# Patient Record
Sex: MALE | Race: WHITE | NOT HISPANIC OR LATINO | URBAN - METROPOLITAN AREA
[De-identification: names, ages, dates, MRNs, and addresses within clinical notes are randomized per-mention and may not be internally consistent; named-entity substitution may affect disease eponyms.]

---

## 2020-01-15 ENCOUNTER — EMERGENCY (EMERGENCY)
Facility: HOSPITAL | Age: 55
LOS: 1 days | Discharge: HOME | End: 2020-01-15
Attending: EMERGENCY MEDICINE
Payer: COMMERCIAL

## 2020-01-15 VITALS
HEIGHT: 73 IN | HEART RATE: 91 BPM | RESPIRATION RATE: 18 BRPM | OXYGEN SATURATION: 99 % | WEIGHT: 190.04 LBS | TEMPERATURE: 98 F | DIASTOLIC BLOOD PRESSURE: 110 MMHG | SYSTOLIC BLOOD PRESSURE: 183 MMHG

## 2020-01-15 LAB
ANION GAP SERPL CALC-SCNC: 14 MMOL/L — SIGNIFICANT CHANGE UP (ref 5–17)
BUN SERPL-MCNC: 14 MG/DL — SIGNIFICANT CHANGE UP (ref 8–20)
CALCIUM SERPL-MCNC: 9.6 MG/DL — SIGNIFICANT CHANGE UP (ref 8.6–10.2)
CHLORIDE SERPL-SCNC: 103 MMOL/L — SIGNIFICANT CHANGE UP (ref 98–107)
CO2 SERPL-SCNC: 22 MMOL/L — SIGNIFICANT CHANGE UP (ref 22–29)
CREAT SERPL-MCNC: 0.83 MG/DL — SIGNIFICANT CHANGE UP (ref 0.5–1.3)
GLUCOSE SERPL-MCNC: 102 MG/DL — SIGNIFICANT CHANGE UP (ref 70–115)
HCT VFR BLD CALC: 45.6 % — SIGNIFICANT CHANGE UP (ref 39–50)
HGB BLD-MCNC: 15.6 G/DL — SIGNIFICANT CHANGE UP (ref 13–17)
MCHC RBC-ENTMCNC: 30.8 PG — SIGNIFICANT CHANGE UP (ref 27–34)
MCHC RBC-ENTMCNC: 34.2 GM/DL — SIGNIFICANT CHANGE UP (ref 32–36)
MCV RBC AUTO: 89.9 FL — SIGNIFICANT CHANGE UP (ref 80–100)
PLATELET # BLD AUTO: 258 K/UL — SIGNIFICANT CHANGE UP (ref 150–400)
POTASSIUM SERPL-MCNC: 3.7 MMOL/L — SIGNIFICANT CHANGE UP (ref 3.5–5.3)
POTASSIUM SERPL-SCNC: 3.7 MMOL/L — SIGNIFICANT CHANGE UP (ref 3.5–5.3)
RBC # BLD: 5.07 M/UL — SIGNIFICANT CHANGE UP (ref 4.2–5.8)
RBC # FLD: 12.5 % — SIGNIFICANT CHANGE UP (ref 10.3–14.5)
SODIUM SERPL-SCNC: 139 MMOL/L — SIGNIFICANT CHANGE UP (ref 135–145)
TROPONIN T SERPL-MCNC: <0.01 NG/ML — SIGNIFICANT CHANGE UP (ref 0–0.06)
TROPONIN T SERPL-MCNC: <0.01 NG/ML — SIGNIFICANT CHANGE UP (ref 0–0.06)
WBC # BLD: 9.18 K/UL — SIGNIFICANT CHANGE UP (ref 3.8–10.5)
WBC # FLD AUTO: 9.18 K/UL — SIGNIFICANT CHANGE UP (ref 3.8–10.5)

## 2020-01-15 PROCEDURE — 71046 X-RAY EXAM CHEST 2 VIEWS: CPT

## 2020-01-15 PROCEDURE — 85027 COMPLETE CBC AUTOMATED: CPT

## 2020-01-15 PROCEDURE — 80048 BASIC METABOLIC PNL TOTAL CA: CPT

## 2020-01-15 PROCEDURE — 93010 ELECTROCARDIOGRAM REPORT: CPT | Mod: 76

## 2020-01-15 PROCEDURE — 93005 ELECTROCARDIOGRAM TRACING: CPT

## 2020-01-15 PROCEDURE — 84484 ASSAY OF TROPONIN QUANT: CPT

## 2020-01-15 PROCEDURE — 71046 X-RAY EXAM CHEST 2 VIEWS: CPT | Mod: 26

## 2020-01-15 PROCEDURE — 99285 EMERGENCY DEPT VISIT HI MDM: CPT

## 2020-01-15 PROCEDURE — 36415 COLL VENOUS BLD VENIPUNCTURE: CPT

## 2020-01-15 NOTE — ED PROVIDER NOTE - OBJECTIVE STATEMENT
54M h/o HTN not compliant with meds p/w mid sternal non radiating CP x 2 hours. Pt arrested today, was sitting in half-way and felt stressed and began having pain. Denies SOB, fever, nausea, vomiting, diaphoresis. +family history of cardiac disease.

## 2020-01-15 NOTE — ED ADULT TRIAGE NOTE - CHIEF COMPLAINT QUOTE
"I had a fight with my girlfriend and now I'm arrested" "I'm stressing out and my chest is killing me" c/o chest pressure starting 1.5 hours ago, pt verbalizing stress and anxiety. Hx HTN is noncompliant with medications. Mac wrist handcuffs in place, +PMS to mac hands. denies sob denies n/v/d denies pain radiation

## 2020-01-15 NOTE — ED PROVIDER NOTE - CARE PROVIDER_API CALL
Ryan Humphries)  Cardiology; Cardiovascular Disease; Internal Medicine  [address] [place] [address]  Phone: [phone]  Fax: [phone]  Follow Up Time:

## 2020-01-15 NOTE — ED PROVIDER NOTE - PROGRESS NOTE DETAILS
Received patient signout from Dr. Aleman.  Patient hx of HTN sitting in [facility] and started having chest pain.  1st set negative.  Patient pending 2nd set.

## 2020-01-15 NOTE — ED PROVIDER NOTE - CLINICAL SUMMARY MEDICAL DECISION MAKING FREE TEXT BOX
Pt with mid sternal CP x 2 hours.   - labs, ecg, trop x 2, cxr, reass Pt with mid sternal CP x 2 hours. HEART score 2  - labs, ecg, trop x 2, cxr, reass

## 2020-01-15 NOTE — ED PROVIDER NOTE - PHYSICAL EXAMINATION
Gen: Well appearing in mild distress  Head: NC/AT  Neck: trachea midline  Resp:  No distress, CTAB  CV: RRR  GI: abd, soft NTND  Ext: no deformities  Neuro:  A&O appears non focal  Skin:  Warm and dry as visualized  Psych:  Normal affect and mood

## 2020-01-15 NOTE — ED PROVIDER NOTE - PATIENT PORTAL LINK FT
You can access the FollowMyHealth Patient Portal offered by [facility] by registering at the following website: [email or web address]. By joining [employer]’s FollowMyHealth portal, you will also be able to view your health information using other applications (apps) compatible with our system.

## 2020-02-01 ENCOUNTER — OUTPATIENT (OUTPATIENT)
Dept: OUTPATIENT SERVICES | Facility: HOSPITAL | Age: 55
LOS: 1 days | End: 2020-02-01
Payer: MEDICAID

## 2020-02-01 PROCEDURE — G9001: CPT

## 2020-02-19 DIAGNOSIS — Z71.89 OTHER SPECIFIED COUNSELING: ICD-10-CM

## 2020-02-19 PROBLEM — I10 ESSENTIAL (PRIMARY) HYPERTENSION: Chronic | Status: ACTIVE | Noted: 2020-01-15

## 2020-12-07 ENCOUNTER — TRANSCRIPTION ENCOUNTER (OUTPATIENT)
Age: 55
End: 2020-12-07

## 2021-05-11 ENCOUNTER — APPOINTMENT (OUTPATIENT)
Dept: INTERNAL MEDICINE | Facility: CLINIC | Age: 56
End: 2021-05-11
Payer: MEDICAID

## 2021-05-11 ENCOUNTER — NON-APPOINTMENT (OUTPATIENT)
Age: 56
End: 2021-05-11

## 2021-05-11 VITALS
WEIGHT: 276 LBS | HEIGHT: 72 IN | BODY MASS INDEX: 37.38 KG/M2 | OXYGEN SATURATION: 97 % | RESPIRATION RATE: 14 BRPM | HEART RATE: 76 BPM | TEMPERATURE: 97.4 F | DIASTOLIC BLOOD PRESSURE: 88 MMHG | SYSTOLIC BLOOD PRESSURE: 122 MMHG

## 2021-05-11 DIAGNOSIS — Z82.3 FAMILY HISTORY OF STROKE: ICD-10-CM

## 2021-05-11 DIAGNOSIS — M25.551 PAIN IN RIGHT HIP: ICD-10-CM

## 2021-05-11 DIAGNOSIS — Z80.0 FAMILY HISTORY OF MALIGNANT NEOPLASM OF DIGESTIVE ORGANS: ICD-10-CM

## 2021-05-11 DIAGNOSIS — N40.1 BENIGN PROSTATIC HYPERPLASIA WITH LOWER URINARY TRACT SYMPMS: ICD-10-CM

## 2021-05-11 DIAGNOSIS — R35.0 BENIGN PROSTATIC HYPERPLASIA WITH LOWER URINARY TRACT SYMPMS: ICD-10-CM

## 2021-05-11 DIAGNOSIS — Z80.42 FAMILY HISTORY OF MALIGNANT NEOPLASM OF PROSTATE: ICD-10-CM

## 2021-05-11 DIAGNOSIS — Z00.00 ENCOUNTER FOR GENERAL ADULT MEDICAL EXAMINATION W/OUT ABNORMAL FINDINGS: ICD-10-CM

## 2021-05-11 PROCEDURE — 99072 ADDL SUPL MATRL&STAF TM PHE: CPT

## 2021-05-11 PROCEDURE — G0444 DEPRESSION SCREEN ANNUAL: CPT

## 2021-05-11 PROCEDURE — G0442 ANNUAL ALCOHOL SCREEN 15 MIN: CPT

## 2021-05-11 PROCEDURE — 99386 PREV VISIT NEW AGE 40-64: CPT | Mod: 25

## 2021-05-11 PROCEDURE — 93000 ELECTROCARDIOGRAM COMPLETE: CPT | Mod: 59

## 2021-05-11 NOTE — HEALTH RISK ASSESSMENT
[Very Good] : ~his/her~  mood as very good [2 - 4 times a month (2 pts)] : 2-4 times a month (2 points) [3 or 4 (1 pt)] : 3 or 4  (1 point) [Never (0 pts)] : Never (0 points) [Yes] : In the past 12 months have you used drugs other than those required for medical reasons? Yes [0] : 2) Feeling down, depressed, or hopeless: Not at all (0) [Patient declined colonoscopy] : Patient declined colonoscopy [HIV test declined] : HIV test declined [Hepatitis C test declined] : Hepatitis C test declined [Alone] : lives alone [Employed] : employed [Single] : single [Significant Other] : lives with significant other [# Of Children ___] : has [unfilled] children [Sexually Active] : sexually active [Reviewed no changes] : Reviewed no changes [] : No [Audit-CScore] : 3 [de-identified] : daily marijuana  [[id number]] : 0 [Change in mental status noted] : No change in mental status noted [None] : None [High Risk Behavior] : no high risk behavior [Reports changes in hearing] : Reports no changes in hearing [Reports changes in vision] : Reports no changes in vision [ColonoscopyDate] : 05/21 [AdvancecareDate] : 05/21

## 2021-05-11 NOTE — ASSESSMENT
[FreeTextEntry1] : -PMH: Obesity, H/o Right femur, BPH\par -SH: Single but marcelina  relationship. 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 Occasional EtOH use. \par \par ARSLAN is a 55 year M whom is here today for an annual well check and to establish care w/ a new PMD\par \par Specialists Involved:\par -GI: Unaware of he saw\par \par EKG obtained in office today demonstrates NSR. normal axis/Intervals. Good-R-wave progression. Normal EKG. \par \par -F/u labs drawn in office today\par -Further recs pending lab results\par -Recommend Ortho consult (Right hip Pain)\par -RTO yearly for CPE or sooner if needed\par

## 2021-05-11 NOTE — HISTORY OF PRESENT ILLNESS
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 Occasional EtOH use. \par \par ARSLAN is a 55 year M whom is here today for an annual well check and to establish care w/ a new PMD\par Today, pt reports feeling well but mentions right hip pain that began a few months ago. Pain is now constant with some days better than others. Denies any recent falls. Pain worse with activity and better with rest. He has tried OTC NSAIDs with some relief. \par \par Specialists Involved:\par -GI: Unaware of he saw\par \par -Vaccines: Declines Flu, TDap & Shingles vaccine \par -Colonoscopy: Normal a few years ago per pt. He cant remember who he saw therefore unable to obtain records. \par -FH of Prostate, Colon, breast or ovarian CA: None known

## 2021-05-12 LAB
ALBUMIN SERPL ELPH-MCNC: 4.5 G/DL
ALP BLD-CCNC: 97 U/L
ALT SERPL-CCNC: 30 U/L
ANION GAP SERPL CALC-SCNC: 15 MMOL/L
AST SERPL-CCNC: 24 U/L
BILIRUB SERPL-MCNC: 0.4 MG/DL
BUN SERPL-MCNC: 15 MG/DL
CALCIUM SERPL-MCNC: 9.6 MG/DL
CHLORIDE SERPL-SCNC: 106 MMOL/L
CHOLEST SERPL-MCNC: 199 MG/DL
CO2 SERPL-SCNC: 19 MMOL/L
CREAT SERPL-MCNC: 0.96 MG/DL
GLUCOSE SERPL-MCNC: 95 MG/DL
HDLC SERPL-MCNC: 33 MG/DL
LDLC SERPL CALC-MCNC: 127 MG/DL
NONHDLC SERPL-MCNC: 166 MG/DL
POTASSIUM SERPL-SCNC: 4.7 MMOL/L
PROT SERPL-MCNC: 7.4 G/DL
PSA SERPL-MCNC: 0.85 NG/ML
SODIUM SERPL-SCNC: 141 MMOL/L
TRIGL SERPL-MCNC: 195 MG/DL

## 2021-05-17 ENCOUNTER — APPOINTMENT (OUTPATIENT)
Dept: ORTHOPEDIC SURGERY | Facility: CLINIC | Age: 56
End: 2021-05-17
Payer: MEDICAID

## 2021-05-17 ENCOUNTER — NON-APPOINTMENT (OUTPATIENT)
Age: 56
End: 2021-05-17

## 2021-05-17 VITALS
HEART RATE: 79 BPM | SYSTOLIC BLOOD PRESSURE: 141 MMHG | BODY MASS INDEX: 37.25 KG/M2 | DIASTOLIC BLOOD PRESSURE: 92 MMHG | HEIGHT: 72 IN | WEIGHT: 275 LBS

## 2021-05-17 DIAGNOSIS — Z78.9 OTHER SPECIFIED HEALTH STATUS: ICD-10-CM

## 2021-05-17 PROCEDURE — 99203 OFFICE O/P NEW LOW 30 MIN: CPT

## 2021-05-17 PROCEDURE — 73502 X-RAY EXAM HIP UNI 2-3 VIEWS: CPT | Mod: RT

## 2021-05-21 NOTE — HISTORY OF PRESENT ILLNESS
[de-identified] : The patient comes in today with complaints of pain to his right hip.  He states when he was in his teens he had a subtrochanteric femur fracture.  He had rodding and he had a nail removed.  Over the last month, he is having persistent complaints of pain to his right hip.  This injury is not work related or due to an automobile accident.  The patient states the pain is constant.  The patient describes the pain as dull, sharp, stabbing and achy.  The patient states walking, bending and lying down makes the pain worse.\par \par Pain level includes a current pain level of 6/10.

## 2021-05-21 NOTE — ADDENDUM
[FreeTextEntry1] : This note was written by Carlee Warren on 05/19/2021 acting as scribe for Km Hamilton III, MD

## 2021-05-21 NOTE — DISCUSSION/SUMMARY
[de-identified] : At this time, he will be started on a course of physical therapy and anti-inflammatories for the right hip osteoarthritis.  We had a long discussion concerning his elevated BMI.  He is at an increased risk for perioperative complications.  He is going to see his primary care and nutritionist for weight loss issues.  He will be reassessed in four to six weeks.

## 2021-05-21 NOTE — PHYSICAL EXAM
[de-identified] : Left Hip: \par Range of Motion in Degrees:\par 	                                 Claimant:	   Normal:	\par Flexion (Active) 	                 120 	   120-degrees	\par Flexion (Passive)	                 120	   120-degrees	\par Extension (Active)	                 -30	   -30-degrees	\par Extension (Passive)	 -30	   -30-degrees	\par Abduction (Active)	                 45-50	   89-06-wykjuqa	\par Abduction (Passive)	 45-50	   01-38-wfdrjph	\par Adduction (Active)  	 20-30	   18-85-simligb	\par Adduction (Passive)	 20-30	   96-46-rzhlapc	\par Internal Rotation (Active) 	 35	   35-degrees	\par Internal Rotation (Passive)	 35	   35-degrees	\par External Rotation (Active)	 45	   45-degrees	\par External Rotation (Passive)	 45	   45-degrees	\par \par No tenderness with internal or external rotation or axial load.  No tenderness to palpation over the greater trochanter.  Negative Trendelenburg.  No tenderness with resisted abduction.  No weakness to flexion, extension, abduction or adduction.  No evidence of instability.  No motor or sensory deficits.  2+ DP and PT pulses.  Skin is intact.  No scars, rashes or lesions.  \par \par Right Hip: \par Range of Motion in Degrees:\par 	                                  Claimant:	Normal:	\par Flexion (Active) 	                  120 	                120-degrees	\par Flexion (Passive)	                  120	                120-degrees	\par Extension (Active)	                  -30	                -30-degrees	\par Extension (Passive)	  -30	                -30-degrees	\par Abduction (Active)	                  45-50	                33-73-mhxuxoy	\par Abduction (Passive)	  45-50	                41-69-dsiawsq	\par Adduction (Active)	                  20-30	                68-02-gygbpma	\par Adduction (Passive)	  20-30	                27-47-uwyhagb	\par Internal Rotation (Active) 	 0	                35-degrees	\par Internal Rotation (Passive)	 0	                35-degrees	\par External Rotation (Active)	 45	                45-degrees	\par External Rotation (Passive)	 45	                45-degrees	\par \par Tenderness into the groin with internal and external rotation and axial load.  No tenderness to palpation over the greater trochanter.  Negative Trendelenburg.  No tenderness with resisted abduction.  No weakness to flexion, extension, abduction or adduction.  No evidence of instability.  No motor or sensory deficits.  2+ DP and PT pulses.\par   [de-identified] : Ambulating with a slightly antalgic to antalgic gait.  Station:  Normal.  [de-identified] : General Appearance:  Well-developed, well-nourished male in no acute distress.\par  [de-identified] : Radiographs, two views of the right hip and one view of the pelvis, show severe arthritis and evidence of a prior subtrochanteric femur fracture.

## 2021-07-29 ENCOUNTER — APPOINTMENT (OUTPATIENT)
Dept: ORTHOPEDIC SURGERY | Facility: CLINIC | Age: 56
End: 2021-07-29
Payer: MEDICAID

## 2021-07-29 VITALS
WEIGHT: 270 LBS | HEIGHT: 73 IN | SYSTOLIC BLOOD PRESSURE: 139 MMHG | HEART RATE: 105 BPM | BODY MASS INDEX: 35.78 KG/M2 | DIASTOLIC BLOOD PRESSURE: 79 MMHG

## 2021-07-29 PROCEDURE — 99213 OFFICE O/P EST LOW 20 MIN: CPT

## 2021-08-30 ENCOUNTER — APPOINTMENT (OUTPATIENT)
Dept: ULTRASOUND IMAGING | Facility: CLINIC | Age: 56
End: 2021-08-30
Payer: MEDICAID

## 2021-08-30 ENCOUNTER — OUTPATIENT (OUTPATIENT)
Dept: OUTPATIENT SERVICES | Facility: HOSPITAL | Age: 56
LOS: 1 days | End: 2021-08-30

## 2021-08-30 ENCOUNTER — RESULT REVIEW (OUTPATIENT)
Age: 56
End: 2021-08-30

## 2021-08-30 DIAGNOSIS — M25.551 PAIN IN RIGHT HIP: ICD-10-CM

## 2021-08-30 PROCEDURE — 20611 DRAIN/INJ JOINT/BURSA W/US: CPT | Mod: RT

## 2021-11-17 ENCOUNTER — APPOINTMENT (OUTPATIENT)
Dept: ORTHOPEDIC SURGERY | Facility: CLINIC | Age: 56
End: 2021-11-17
Payer: MEDICAID

## 2021-11-17 VITALS
DIASTOLIC BLOOD PRESSURE: 95 MMHG | WEIGHT: 255 LBS | SYSTOLIC BLOOD PRESSURE: 145 MMHG | HEART RATE: 86 BPM | HEIGHT: 73 IN | BODY MASS INDEX: 33.8 KG/M2

## 2021-11-17 PROCEDURE — 99213 OFFICE O/P EST LOW 20 MIN: CPT

## 2021-11-23 NOTE — ADDENDUM
[FreeTextEntry1] : This note was written by Meliza Sauceda on 11/23/2021 acting as scribe for Km Hamilton III, MD

## 2021-11-23 NOTE — PHYSICAL EXAM
[de-identified] : Right Hip: \par Range of Motion in Degrees:\par 	 Claimant:	Normal:	\par Flexion (Active) 	 120 	 120-degrees	\par Flexion (Passive)	 120	 120-degrees	\par Extension (Active)	 -30	 -30-degrees	\par Extension (Passive)	 -30	 -30-degrees	\par Abduction (Active)	 45-50	 90-15-mkobkgw	\par Abduction (Passive)	 45-50	 53-80-eoinjte	\par Adduction (Active)	 20-30	 74-44-vuuxtun	\par Adduction (Passive)	 20-30	 16-83-qxjeupz	\par Internal Rotation (Active) 	 0	 35-degrees	\par Internal Rotation (Passive)	 0	 35-degrees	\par External Rotation (Active)	 45	 45-degrees	\par External Rotation (Passive)	 45	 45-degrees	\par \par Tenderness into the groin with internal and external rotation and axial load. No tenderness to palpation over the greater trochanter. Negative Trendelenburg. No tenderness with resisted abduction. No weakness to flexion, extension, abduction or adduction. No evidence of instability. No motor or sensory deficits. 2+ DP and PT pulses.\par  [de-identified] : Gait and Station:  Ambulating with a slightly antalgic to antalgic gait.  Station:  Normal.  [de-identified] : Appearance:  Well-developed, well-nourished male in no acute distress.\par

## 2021-11-23 NOTE — DISCUSSION/SUMMARY
[de-identified] : At this time, due to osteoarthritis of the right hip, the patient will start physical therapy and be reassessed in 4-6 weeks.

## 2021-12-11 ENCOUNTER — TRANSCRIPTION ENCOUNTER (OUTPATIENT)
Age: 56
End: 2021-12-11

## 2022-02-23 ENCOUNTER — APPOINTMENT (OUTPATIENT)
Dept: ORTHOPEDIC SURGERY | Facility: CLINIC | Age: 57
End: 2022-02-23
Payer: MEDICAID

## 2022-02-23 VITALS
WEIGHT: 256 LBS | SYSTOLIC BLOOD PRESSURE: 126 MMHG | HEART RATE: 62 BPM | BODY MASS INDEX: 33.93 KG/M2 | DIASTOLIC BLOOD PRESSURE: 84 MMHG | HEIGHT: 73 IN

## 2022-02-23 DIAGNOSIS — M16.11 UNILATERAL PRIMARY OSTEOARTHRITIS, RIGHT HIP: ICD-10-CM

## 2022-02-23 PROCEDURE — 99212 OFFICE O/P EST SF 10 MIN: CPT

## 2022-02-23 PROCEDURE — 73502 X-RAY EXAM HIP UNI 2-3 VIEWS: CPT | Mod: RT

## 2022-02-24 PROBLEM — M16.11 PRIMARY OSTEOARTHRITIS OF RIGHT HIP: Status: ACTIVE | Noted: 2021-05-17

## 2022-02-24 NOTE — ADDENDUM
[FreeTextEntry1] : This note was written by Maria M Beth on 02/24/2022 acting as a scribe for BRENDEN HENDERSON III, MD

## 2022-02-24 NOTE — DISCUSSION/SUMMARY
[de-identified] : At this time, due to osteoarthritis of the right hip, he will be sent for an intra-articular injection.  He states he will go to see a nutritionist.  He will be reassessed in six to eight weeks.

## 2022-02-24 NOTE — PHYSICAL EXAM
[de-identified] : Right Hip: \par Range of Motion in Degrees:\par 	                                  Claimant:	Normal:	\par Flexion (Active) 	                  120 	                120-degrees	\par Flexion (Passive)	                  120	                120-degrees	\par Extension (Active)	                  -30	                -30-degrees	\par Extension (Passive)	  -30	                -30-degrees	\par Abduction (Active)	                  45-50	                22-13-ugipejf	\par Abduction (Passive)	  45-50	                75-03-otdsfok	\par Adduction (Active)	                  20-30	                13-06-asboaig	\par Adduction (Passive)	  20-30	                09-45-kfhyysr	\par Internal Rotation (Active) 	  0	                35-degrees	\par Internal Rotation (Passive)	  0	                35-degrees	\par External Rotation (Active)	 45	                45-degrees	\par External Rotation (Passive)	 45	                45-degrees	\par \par Tenderness into the groin with internal and external rotation and axial load.  No tenderness to palpation over the greater trochanter.  Negative Trendelenburg.  No tenderness with resisted abduction.  No weakness to flexion, extension, abduction or adduction.  No evidence of instability.  No motor or sensory deficits.  2+ DP and PT pulses.  Significant posterior scar. \par  [de-identified] : Ambulating with a slightly antalgic to antalgic gait.  Station:  Normal.  [de-identified] : Appearance:  Well-developed, well-nourished male in no acute distress.\par   [de-identified] : Radiographs, two-three views of the right hip, including the pelvis, reveals severe osteoarthritis with healed proximal femur fracture.

## 2022-02-24 NOTE — HISTORY OF PRESENT ILLNESS
[de-identified] : The patient comes in today with increasing complaints of pain to his right hip.  He has lost a significant amount of weight.

## 2022-02-24 NOTE — HISTORY OF PRESENT ILLNESS
[de-identified] : The patient comes in today with persistent complaints to his right hip.  He states he hasn't had any weight loss.

## 2022-02-24 NOTE — DISCUSSION/SUMMARY
[de-identified] : At this time, due to osteoarthritis of the right hip, I had a long discussion with him and I will refer him to Dr. Ochoa.  I think he may benefit from an anterior approach secondary to the significant scarification posteriorly.

## 2022-02-24 NOTE — CONSULT LETTER
[Dear  ___] : Dear  [unfilled], [FreeTextEntry1] : \par I am referring Martin Hinson to you for further evaluation.\par \par Thank you very much for seeing this patient.  My most recent\par evaluation follows.\par \par If you have any questions, please do not hesitate to contact me.\par \par Sincerely,\par \par \par Km Hamilton III, MD\par [facility]/sg

## 2022-02-24 NOTE — PHYSICAL EXAM
[de-identified] : Right Hip: \par Range of Motion in Degrees:\par 	   Claimant:	Normal:	\par Flexion (Active) 	  120 	 120-degrees	\par Flexion (Passive)	  120	 120-degrees	\par Extension (Active)	  -30	 -30-degrees	\par Extension (Passive)	 -30	 -30-degrees	\par Abduction (Active)	  45-50	 08-61-nzkemfi	\par Abduction (Passive)	 45-50	 10-56-kwfutcj	\par Adduction (Active)	  20-30	 84-06-isvzlyd	\par Adduction (Passive)	 20-30	 80-60-gvqdysc	\par Internal Rotation (Active) 	 0	 35-degrees	\par Internal Rotation (Passive)	 0	 35-degrees	\par External Rotation (Active)	 45	 45-degrees	\par External Rotation (Passive)	 45	 45-degrees	\par \par Tenderness into the groin with internal and external rotation and axial load. No tenderness to palpation over the greater trochanter. Negative Trendelenburg. No tenderness with resisted abduction. No weakness to flexion, extension, abduction or adduction. No evidence of instability. No motor or sensory deficits. 2+ DP and PT pulses.\par  [de-identified] : Ambulating with a slightly antalgic to antalgic gait.  Station:  Normal.  [de-identified] : Appearance:  Well-developed, well-nourished male in no acute distress.\par

## 2022-02-24 NOTE — ADDENDUM
[FreeTextEntry1] : This note was written by Maria M Beth on 08/04/2021 acting as a scribe for BRENDEN HENDERSON III, MD

## 2022-06-01 ENCOUNTER — NON-APPOINTMENT (OUTPATIENT)
Age: 57
End: 2022-06-01

## 2022-06-01 ENCOUNTER — APPOINTMENT (OUTPATIENT)
Dept: CARDIOLOGY | Facility: CLINIC | Age: 57
End: 2022-06-01
Payer: MEDICAID

## 2022-06-01 ENCOUNTER — APPOINTMENT (OUTPATIENT)
Dept: INTERNAL MEDICINE | Facility: CLINIC | Age: 57
End: 2022-06-01
Payer: MEDICAID

## 2022-06-01 VITALS — SYSTOLIC BLOOD PRESSURE: 120 MMHG | DIASTOLIC BLOOD PRESSURE: 84 MMHG

## 2022-06-01 VITALS
OXYGEN SATURATION: 98 % | SYSTOLIC BLOOD PRESSURE: 110 MMHG | TEMPERATURE: 97.7 F | HEART RATE: 76 BPM | DIASTOLIC BLOOD PRESSURE: 86 MMHG | WEIGHT: 240 LBS | BODY MASS INDEX: 31.81 KG/M2 | HEIGHT: 73 IN

## 2022-06-01 VITALS
HEART RATE: 100 BPM | HEIGHT: 73 IN | WEIGHT: 246 LBS | OXYGEN SATURATION: 98 % | SYSTOLIC BLOOD PRESSURE: 120 MMHG | DIASTOLIC BLOOD PRESSURE: 80 MMHG | TEMPERATURE: 97.2 F | BODY MASS INDEX: 32.6 KG/M2

## 2022-06-01 PROCEDURE — 99214 OFFICE O/P EST MOD 30 MIN: CPT

## 2022-06-01 PROCEDURE — 99205 OFFICE O/P NEW HI 60 MIN: CPT | Mod: 25

## 2022-06-01 PROCEDURE — 93000 ELECTROCARDIOGRAM COMPLETE: CPT

## 2022-06-01 RX ORDER — NYSTATIN AND TRIAMCINOLONE ACETONIDE 100000; 1 MG/G; MG/G
100000-0.1 CREAM TOPICAL TWICE DAILY
Qty: 1 | Refills: 0 | Status: DISCONTINUED | COMMUNITY
Start: 2022-06-01 | End: 2022-06-01

## 2022-06-01 NOTE — PHYSICAL EXAM
[Normal] : no carotid or abdominal bruits heard, no varicosities, pedal pulses are present, no peripheral edema, no extremity clubbing or cyanosis and no palpable aorta [de-identified] : irregular rate [de-identified] : erythematous rash in groin b/l

## 2022-06-01 NOTE — HISTORY OF PRESENT ILLNESS
[FreeTextEntry8] : -PMH: AFib, Obesity, H/o Right femur, BPH\par -SH: Single but marcelina relationship. 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 Occasional EtOH use. \par \par ARSLAN is a 56 year M whom is here today w/ c/o skin rash & newly Dx AFib detected by Cardio during pre-op clearance visit\par Rash located in going bilaterally ongoing x3-4 months\par Not any better\par Reports associated itching and burning sensation\par Describes rash as red\par \par -Per Cardio: f/u ECHO, Exercise nuclear stress test. Start  mg ( ChASVASC 0 ) \par return after tests done with NP to evaluate risk of surgery. if echo normal and stress test negative, can proceed with surgery. Consideration of cardioversion to NSR after the surgery since the patient will require to be on oral anticoagulant for at least 1 month post cardioversion.

## 2022-06-16 ENCOUNTER — APPOINTMENT (OUTPATIENT)
Dept: CARDIOLOGY | Facility: CLINIC | Age: 57
End: 2022-06-16
Payer: MEDICAID

## 2022-06-16 PROCEDURE — 78452 HT MUSCLE IMAGE SPECT MULT: CPT

## 2022-06-16 PROCEDURE — A9500: CPT

## 2022-06-16 PROCEDURE — 93015 CV STRESS TEST SUPVJ I&R: CPT

## 2022-06-16 PROCEDURE — 93306 TTE W/DOPPLER COMPLETE: CPT

## 2022-06-23 ENCOUNTER — OUTPATIENT (OUTPATIENT)
Dept: OUTPATIENT SERVICES | Facility: HOSPITAL | Age: 57
LOS: 1 days | End: 2022-06-23
Payer: MEDICAID

## 2022-06-23 PROCEDURE — 93010 ELECTROCARDIOGRAM REPORT: CPT

## 2022-06-27 DIAGNOSIS — M16.11 UNILATERAL PRIMARY OSTEOARTHRITIS, RIGHT HIP: ICD-10-CM

## 2022-06-27 DIAGNOSIS — Z01.812 ENCOUNTER FOR PREPROCEDURAL LABORATORY EXAMINATION: ICD-10-CM

## 2022-06-27 DIAGNOSIS — Z01.810 ENCOUNTER FOR PREPROCEDURAL CARDIOVASCULAR EXAMINATION: ICD-10-CM

## 2022-06-29 ENCOUNTER — APPOINTMENT (OUTPATIENT)
Dept: INTERNAL MEDICINE | Facility: CLINIC | Age: 57
End: 2022-06-29

## 2022-06-29 VITALS
HEART RATE: 80 BPM | SYSTOLIC BLOOD PRESSURE: 140 MMHG | DIASTOLIC BLOOD PRESSURE: 86 MMHG | TEMPERATURE: 97.5 F | RESPIRATION RATE: 16 BRPM | BODY MASS INDEX: 32.34 KG/M2 | HEIGHT: 73 IN | OXYGEN SATURATION: 98 % | WEIGHT: 244 LBS

## 2022-06-29 PROCEDURE — 99214 OFFICE O/P EST MOD 30 MIN: CPT

## 2022-06-29 NOTE — ADDENDUM
[FreeTextEntry1] : stress test shows no reversible ischemia, \par Echo showed preserved EF \par can proceed with hip surgery with low risk of perioperative cardiac events. \par

## 2022-06-29 NOTE — REVIEW OF SYSTEMS
[Joint Pain] : joint pain [Negative] : Heme/Lymph [FreeTextEntry8] : BPH symptoms  [FreeTextEntry9] : right hip join

## 2022-06-29 NOTE — HISTORY OF PRESENT ILLNESS
[FreeTextEntry1] : A 55 y/o male with right hip OA going going for hip replacement. he is here for perioperative risk stratification. \par He has NO history of DM, HTN, hyperlipidemia . He doesn't smoke cigarettes. No history of CVA, CKD or MI \par FH of CAD in grandfather (  at 48) \par no exertional chest pain or  SOB, palpitations, dizziness or syncope\par only SOB on more than ordinrary exertion \par \par

## 2022-06-29 NOTE — ASSESSMENT
[FreeTextEntry1] : 55 y/o male who is here for preoperative risk stratification prior to right hip surgery \par EKG shows newly diagnosed atrial fibrillation, ,rate controlled \par CHADSVASC 0 , \par will order an echo and an exercise nuclear stress test ( Afib at young age and FH of CAD , + SOB that can be angina equivalent) \par will treat with  mg ( ChASVASC 0 ) \par return after tests done with NP  to evaluate risk of surgery . \par if  echo within normal and stress test negative, can proceed with surgery . Consideration of cardioversion to NSR after the surgery since the patient will require to be on oral anticoagulant for at least 1  month post cardioversion.

## 2022-07-06 NOTE — ASSESSMENT
[As per surgery] : as per surgery [Patient Optimized for Surgery] : Patient optimized for surgery [Other: _____] : [unfilled] [Continue anti-platelet treatment as is] : Continue current anti-platelet treatment [FreeTextEntry4] : 57yo obese male w/ PMH new onset Rate Controlled AFib on ASA, is here today for medical clearance for upcoming intermediate risk surgery\par Per Cardio, pt can proceed with hip surgery with low risk of perioperative cardiac events.\par PST Labs/EKG reviewed & WNL\par \par Pt to continue ASA unless instructed otherwise given new onset AFib\par DC use of all vitamin supplements & NSAIDs at least 7 days prior to surgery. Tylenol is OK\par Risk and benefit associated with upcoming surgery discussed with patient\par Clearance pending Dental Clearance \par

## 2022-07-06 NOTE — RESULTS/DATA
[] : results reviewed [de-identified] : WNL [de-identified] : WNL [de-identified] : WNL [de-identified] : AFib [de-identified] : stress test shows no reversible ischemia\par Echo showed preserved EF

## 2022-07-06 NOTE — HISTORY OF PRESENT ILLNESS
[Atrial Fibrillation] : atrial fibrillation [No Pertinent Pulmonary History] : no history of asthma, COPD, sleep apnea, or smoking [No Adverse Anesthesia Reaction] : no adverse anesthesia reaction in self or family member [(Patient denies any chest pain, claudication, dyspnea on exertion, orthopnea, palpitations or syncope)] : Patient denies any chest pain, claudication, dyspnea on exertion, orthopnea, palpitations or syncope [Excellent (>10 METs)] : Excellent (>10 METs) [Anti-Platelet Agents: _____] : Anti-Platelet Agents: [unfilled] [Aortic Stenosis] : no aortic stenosis [Coronary Artery Disease] : no coronary artery disease [Recent Myocardial Infarction] : no recent myocardial infarction [Implantable Device/Pacemaker] : no implantable device/pacemaker [Chronic Anticoagulation] : no chronic anticoagulation [Chronic Kidney Disease] : no chronic kidney disease [Diabetes] : no diabetes [FreeTextEntry1] : Right Total Hip Replacement [FreeTextEntry2] : 7/14/22 [FreeTextEntry4] : -PMH: Moderate LVH (ECHO: 6/2022), AFib, Obesity, H/o Right femur, BPH\par -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 Occasional EtOH use. \par \par ARSLAN is a 56 year M whom is here today w/ c/o skin rash & newly Dx AFib detected by Cardio during pre-op clearance visit\par Reports he has a loose tooth which his surgeon is requesting a dental clearance\par \par -Per Cardio: (June 16, 2022) ECHO w/ Moderate LVH & Exercise nuclear stress test: Small defect involving inferior wall. Per cardiology ok to proceed with surgery. Remains on  mg ( ChASVASC 0). Consideration of cardioversion to NSR after the surgery since the patient will require to be on oral anticoagulant for at least 1 month post cardioversion.

## 2022-07-14 ENCOUNTER — NON-APPOINTMENT (OUTPATIENT)
Age: 57
End: 2022-07-14

## 2022-07-14 ENCOUNTER — OUTPATIENT (OUTPATIENT)
Dept: OUTPATIENT SERVICES | Facility: HOSPITAL | Age: 57
LOS: 1 days | End: 2022-07-14
Payer: MEDICARE

## 2022-07-15 ENCOUNTER — APPOINTMENT (OUTPATIENT)
Dept: CARDIOLOGY | Facility: CLINIC | Age: 57
End: 2022-07-15

## 2022-07-15 ENCOUNTER — NON-APPOINTMENT (OUTPATIENT)
Age: 57
End: 2022-07-15

## 2022-07-15 VITALS
BODY MASS INDEX: 32.37 KG/M2 | DIASTOLIC BLOOD PRESSURE: 84 MMHG | HEIGHT: 73 IN | SYSTOLIC BLOOD PRESSURE: 142 MMHG | HEART RATE: 90 BPM | TEMPERATURE: 97.8 F | RESPIRATION RATE: 14 BRPM | OXYGEN SATURATION: 98 % | WEIGHT: 244.2 LBS

## 2022-07-15 VITALS — SYSTOLIC BLOOD PRESSURE: 144 MMHG | DIASTOLIC BLOOD PRESSURE: 92 MMHG

## 2022-07-15 PROCEDURE — 99214 OFFICE O/P EST MOD 30 MIN: CPT | Mod: 25

## 2022-07-15 PROCEDURE — 93000 ELECTROCARDIOGRAM COMPLETE: CPT

## 2022-07-16 ENCOUNTER — NON-APPOINTMENT (OUTPATIENT)
Age: 57
End: 2022-07-16

## 2022-07-17 ENCOUNTER — EMERGENCY (EMERGENCY)
Facility: HOSPITAL | Age: 57
LOS: 1 days | Discharge: HOME | End: 2022-07-17
Attending: EMERGENCY MEDICINE
Payer: COMMERCIAL

## 2022-07-17 VITALS
OXYGEN SATURATION: 98 % | SYSTOLIC BLOOD PRESSURE: 133 MMHG | HEART RATE: 59 BPM | WEIGHT: 244.93 LBS | RESPIRATION RATE: 18 BRPM | DIASTOLIC BLOOD PRESSURE: 92 MMHG | HEIGHT: 73 IN | TEMPERATURE: 98 F

## 2022-07-17 PROCEDURE — 73630 X-RAY EXAM OF FOOT: CPT

## 2022-07-17 PROCEDURE — 99283 EMERGENCY DEPT VISIT LOW MDM: CPT

## 2022-07-17 PROCEDURE — 73630 X-RAY EXAM OF FOOT: CPT | Mod: 26,RT

## 2022-07-17 PROCEDURE — 99283 EMERGENCY DEPT VISIT LOW MDM: CPT | Mod: 25

## 2022-07-17 NOTE — ED PROVIDER NOTE - CARE PROVIDER_API CALL
Leif Aquino (DPM)  Podiatric Medicine and Surgery  [facility] [place] [address]  Phone: [phone]  Fax: [phone]  Follow Up Time:

## 2022-07-17 NOTE — ED ADULT TRIAGE NOTE - CHIEF COMPLAINT QUOTE
" stepped on something a few weeks ago and it hurts to walk on R foot" sent from urgent care for xray

## 2022-07-17 NOTE — ED PROVIDER NOTE - PATIENT PORTAL LINK FT
You can access the FollowMyHealth Patient Portal offered by [facility] by registering at the following website: [email or web address]. By joining [employer]’s FollowMyHealth portal, you will also be able to view your health information using other applications (apps) compatible with our system.

## 2022-07-17 NOTE — ED PROVIDER NOTE - PHYSICAL EXAMINATION
Const: AOX3 nontoxic appearing, no apparent respiratory or physical distress. Stable gait   HEENT: NC/AT. Moist mucous membranes.  Eyes: KOMAL. EOMI  Neck: Soft and supple. Full ROM without pain.  Resp: Speaking in full sentences. No evidence of respiratory distress  MSK   right foot with +dp pulse Cr less than 3   sole of the right foot with small pointer - at the base of the 5th metatarsal noted no erythema or edema or drainage- Tender on pressing    Skin: No rashes, abrasions or lacerations.  Neuro: Awake, alert & oriented x 3. Moves all extremities symmetrically.

## 2022-07-17 NOTE — ED PROVIDER NOTE - NS ED ATTENDING STATEMENT MOD
This was a shared visit with the RUBY. I reviewed and verified the documentation and independently performed the documented:

## 2022-07-17 NOTE — ED PROVIDER NOTE - OBJECTIVE STATEMENT
57 y,o male present in ER and c.o foreign body sensation on the sole of the  foot possiby he [place] . states is so pain full that he went to the urgent care and sent her for out pt xray - denies any fever or chills or any bleeding . 57 y,o male present in ER and c.o foreign body sensation on the sole of the  foot possiby he step [place] something [place] 2[place] . states is so pain full that he went to the urgent care and sent her for out pt xray - denies any fever or chills or any bleeding . RX anbx sent by urgent care for the pt

## 2022-07-17 NOTE — ED PROVIDER NOTE - ATTENDING APP SHARED VISIT CONTRIBUTION OF CARE
FB to foot, unable to be removed, dc home with podiatry f/u.       I performed a history and physical exam of the patient and discussed their management with the advanced care provider. I reviewed the advanced care provider's note and agree with the documented findings and plan of care. My medical decision making and objective findings are found above.

## 2022-07-18 ENCOUNTER — NON-APPOINTMENT (OUTPATIENT)
Age: 57
End: 2022-07-18

## 2022-07-18 VITALS — DIASTOLIC BLOOD PRESSURE: 90 MMHG | SYSTOLIC BLOOD PRESSURE: 160 MMHG

## 2022-07-18 VITALS — DIASTOLIC BLOOD PRESSURE: 94 MMHG | SYSTOLIC BLOOD PRESSURE: 152 MMHG

## 2022-07-19 ENCOUNTER — NON-APPOINTMENT (OUTPATIENT)
Age: 57
End: 2022-07-19

## 2022-07-22 VITALS — DIASTOLIC BLOOD PRESSURE: 68 MMHG | SYSTOLIC BLOOD PRESSURE: 116 MMHG

## 2022-07-24 ENCOUNTER — NON-APPOINTMENT (OUTPATIENT)
Age: 57
End: 2022-07-24

## 2022-07-24 DIAGNOSIS — M16.11 UNILATERAL PRIMARY OSTEOARTHRITIS, RIGHT HIP: ICD-10-CM

## 2022-07-24 DIAGNOSIS — Z53.09 PROCEDURE AND TREATMENT NOT CARRIED OUT BECAUSE OF OTHER CONTRAINDICATION: ICD-10-CM

## 2022-07-24 LAB
ANION GAP SERPL CALC-SCNC: 13 MMOL/L
BUN SERPL-MCNC: 20 MG/DL
CALCIUM SERPL-MCNC: 9.7 MG/DL
CHLORIDE SERPL-SCNC: 106 MMOL/L
CO2 SERPL-SCNC: 23 MMOL/L
CREAT SERPL-MCNC: 1 MG/DL
EGFR: 89 ML/MIN/1.73M2
GLUCOSE SERPL-MCNC: 92 MG/DL
POTASSIUM SERPL-SCNC: 4.4 MMOL/L
SODIUM SERPL-SCNC: 142 MMOL/L

## 2022-07-29 ENCOUNTER — OUTPATIENT (OUTPATIENT)
Dept: OUTPATIENT SERVICES | Facility: HOSPITAL | Age: 57
LOS: 1 days | End: 2022-07-29

## 2022-08-01 DIAGNOSIS — Z01.812 ENCOUNTER FOR PREPROCEDURAL LABORATORY EXAMINATION: ICD-10-CM

## 2022-08-01 DIAGNOSIS — M16.11 UNILATERAL PRIMARY OSTEOARTHRITIS, RIGHT HIP: ICD-10-CM

## 2022-08-03 ENCOUNTER — APPOINTMENT (OUTPATIENT)
Dept: CARDIOLOGY | Facility: CLINIC | Age: 57
End: 2022-08-03

## 2022-08-03 ENCOUNTER — NON-APPOINTMENT (OUTPATIENT)
Age: 57
End: 2022-08-03

## 2022-08-03 VITALS
SYSTOLIC BLOOD PRESSURE: 108 MMHG | WEIGHT: 245 LBS | HEART RATE: 100 BPM | HEIGHT: 73 IN | BODY MASS INDEX: 32.47 KG/M2 | OXYGEN SATURATION: 96 % | DIASTOLIC BLOOD PRESSURE: 74 MMHG | TEMPERATURE: 98.5 F

## 2022-08-03 PROCEDURE — 99214 OFFICE O/P EST MOD 30 MIN: CPT | Mod: 25

## 2022-08-03 PROCEDURE — 93000 ELECTROCARDIOGRAM COMPLETE: CPT

## 2022-08-03 RX ORDER — NYSTATIN 100000 1/G
100000 POWDER TOPICAL
Qty: 1 | Refills: 1 | Status: DISCONTINUED | COMMUNITY
Start: 2022-06-01 | End: 2022-08-03

## 2022-08-10 ENCOUNTER — OUTPATIENT (OUTPATIENT)
Dept: OUTPATIENT SERVICES | Facility: HOSPITAL | Age: 57
LOS: 1 days | End: 2022-08-10

## 2022-08-10 ENCOUNTER — OUTPATIENT (OUTPATIENT)
Dept: OUTPATIENT SERVICES | Facility: HOSPITAL | Age: 57
LOS: 1 days | Discharge: HOME | End: 2022-08-10

## 2022-08-10 DIAGNOSIS — I10 ESSENTIAL (PRIMARY) HYPERTENSION: ICD-10-CM

## 2022-08-10 DIAGNOSIS — M16.11 UNILATERAL PRIMARY OSTEOARTHRITIS, RIGHT HIP: ICD-10-CM

## 2022-08-10 DIAGNOSIS — I48.0 PAROXYSMAL ATRIAL FIBRILLATION: ICD-10-CM

## 2022-08-10 PROCEDURE — 88304 TISSUE EXAM BY PATHOLOGIST: CPT | Mod: 26

## 2022-08-10 PROCEDURE — 88311 DECALCIFY TISSUE: CPT | Mod: 26

## 2022-08-11 ENCOUNTER — OUTPATIENT (OUTPATIENT)
Dept: OUTPATIENT SERVICES | Facility: HOSPITAL | Age: 57
LOS: 1 days | End: 2022-08-11

## 2022-08-15 ENCOUNTER — NON-APPOINTMENT (OUTPATIENT)
Age: 57
End: 2022-08-15

## 2022-08-17 ENCOUNTER — APPOINTMENT (OUTPATIENT)
Dept: CARDIOLOGY | Facility: CLINIC | Age: 57
End: 2022-08-17

## 2022-08-17 VITALS
BODY MASS INDEX: 31.78 KG/M2 | WEIGHT: 239.8 LBS | TEMPERATURE: 98.1 F | OXYGEN SATURATION: 98 % | SYSTOLIC BLOOD PRESSURE: 118 MMHG | HEIGHT: 73 IN | DIASTOLIC BLOOD PRESSURE: 86 MMHG | HEART RATE: 71 BPM

## 2022-08-17 PROCEDURE — 99214 OFFICE O/P EST MOD 30 MIN: CPT

## 2022-08-29 ENCOUNTER — NON-APPOINTMENT (OUTPATIENT)
Age: 57
End: 2022-08-29

## 2022-09-06 ENCOUNTER — APPOINTMENT (OUTPATIENT)
Dept: ELECTROPHYSIOLOGY | Facility: CLINIC | Age: 57
End: 2022-09-06

## 2022-09-07 ENCOUNTER — NON-APPOINTMENT (OUTPATIENT)
Age: 57
End: 2022-09-07

## 2022-09-07 DIAGNOSIS — M16.11 UNILATERAL PRIMARY OSTEOARTHRITIS, RIGHT HIP: ICD-10-CM

## 2022-09-07 DIAGNOSIS — I48.0 PAROXYSMAL ATRIAL FIBRILLATION: ICD-10-CM

## 2022-09-07 DIAGNOSIS — I10 ESSENTIAL (PRIMARY) HYPERTENSION: ICD-10-CM

## 2022-09-07 DIAGNOSIS — Z96.641 PRESENCE OF RIGHT ARTIFICIAL HIP JOINT: ICD-10-CM

## 2022-09-07 DIAGNOSIS — K21.9 GASTRO-ESOPHAGEAL REFLUX DISEASE WITHOUT ESOPHAGITIS: ICD-10-CM

## 2022-10-05 ENCOUNTER — APPOINTMENT (OUTPATIENT)
Dept: ELECTROPHYSIOLOGY | Facility: CLINIC | Age: 57
End: 2022-10-05

## 2022-10-18 ENCOUNTER — APPOINTMENT (OUTPATIENT)
Dept: ELECTROPHYSIOLOGY | Facility: CLINIC | Age: 57
End: 2022-10-18

## 2022-11-09 ENCOUNTER — APPOINTMENT (OUTPATIENT)
Dept: CARDIOLOGY | Facility: CLINIC | Age: 57
End: 2022-11-09

## 2022-11-14 ENCOUNTER — EMERGENCY (EMERGENCY)
Facility: HOSPITAL | Age: 57
LOS: 1 days | Discharge: OTHER INSTITUTION | End: 2022-11-14
Attending: STUDENT IN AN ORGANIZED HEALTH CARE EDUCATION/TRAINING PROGRAM
Payer: COMMERCIAL

## 2022-11-14 VITALS
OXYGEN SATURATION: 98 % | TEMPERATURE: 98 F | SYSTOLIC BLOOD PRESSURE: 157 MMHG | RESPIRATION RATE: 20 BRPM | WEIGHT: 250 LBS | HEIGHT: 73 IN | DIASTOLIC BLOOD PRESSURE: 83 MMHG | HEART RATE: 76 BPM

## 2022-11-14 LAB
ALBUMIN SERPL ELPH-MCNC: 3.1 G/DL — LOW (ref 3.3–5.2)
ALP SERPL-CCNC: 131 U/L — HIGH (ref 40–120)
ALT FLD-CCNC: <5 U/L — SIGNIFICANT CHANGE UP
ANION GAP SERPL CALC-SCNC: 10 MMOL/L — SIGNIFICANT CHANGE UP (ref 5–17)
APTT BLD: 35.5 SEC — SIGNIFICANT CHANGE UP (ref 27.5–35.5)
AST SERPL-CCNC: 9 U/L — SIGNIFICANT CHANGE UP
BASOPHILS # BLD AUTO: 0.04 K/UL — SIGNIFICANT CHANGE UP (ref 0–0.2)
BASOPHILS NFR BLD AUTO: 0.4 % — SIGNIFICANT CHANGE UP (ref 0–2)
BILIRUB SERPL-MCNC: 0.3 MG/DL — LOW (ref 0.4–2)
BLD GP AB SCN SERPL QL: SIGNIFICANT CHANGE UP
BUN SERPL-MCNC: 13.9 MG/DL — SIGNIFICANT CHANGE UP (ref 8–20)
CALCIUM SERPL-MCNC: 9 MG/DL — SIGNIFICANT CHANGE UP (ref 8.4–10.5)
CHLORIDE SERPL-SCNC: 99 MMOL/L — SIGNIFICANT CHANGE UP (ref 96–108)
CO2 SERPL-SCNC: 25 MMOL/L — SIGNIFICANT CHANGE UP (ref 22–29)
CREAT SERPL-MCNC: 0.69 MG/DL — SIGNIFICANT CHANGE UP (ref 0.5–1.3)
EGFR: 109 ML/MIN/1.73M2 — SIGNIFICANT CHANGE UP
EOSINOPHIL # BLD AUTO: 0.33 K/UL — SIGNIFICANT CHANGE UP (ref 0–0.5)
EOSINOPHIL NFR BLD AUTO: 3.1 % — SIGNIFICANT CHANGE UP (ref 0–6)
GLUCOSE SERPL-MCNC: 103 MG/DL — HIGH (ref 70–99)
HCT VFR BLD CALC: 28.6 % — LOW (ref 39–50)
HGB BLD-MCNC: 9.5 G/DL — LOW (ref 13–17)
IMM GRANULOCYTES NFR BLD AUTO: 0.7 % — SIGNIFICANT CHANGE UP (ref 0–0.9)
INR BLD: 1.18 RATIO — HIGH (ref 0.88–1.16)
LYMPHOCYTES # BLD AUTO: 2.28 K/UL — SIGNIFICANT CHANGE UP (ref 1–3.3)
LYMPHOCYTES # BLD AUTO: 21.6 % — SIGNIFICANT CHANGE UP (ref 13–44)
MCHC RBC-ENTMCNC: 28.7 PG — SIGNIFICANT CHANGE UP (ref 27–34)
MCHC RBC-ENTMCNC: 33.2 GM/DL — SIGNIFICANT CHANGE UP (ref 32–36)
MCV RBC AUTO: 86.4 FL — SIGNIFICANT CHANGE UP (ref 80–100)
MONOCYTES # BLD AUTO: 0.78 K/UL — SIGNIFICANT CHANGE UP (ref 0–0.9)
MONOCYTES NFR BLD AUTO: 7.4 % — SIGNIFICANT CHANGE UP (ref 2–14)
NEUTROPHILS # BLD AUTO: 7.08 K/UL — SIGNIFICANT CHANGE UP (ref 1.8–7.4)
NEUTROPHILS NFR BLD AUTO: 66.8 % — SIGNIFICANT CHANGE UP (ref 43–77)
PLATELET # BLD AUTO: 539 K/UL — HIGH (ref 150–400)
POTASSIUM SERPL-MCNC: 4.3 MMOL/L — SIGNIFICANT CHANGE UP (ref 3.5–5.3)
POTASSIUM SERPL-SCNC: 4.3 MMOL/L — SIGNIFICANT CHANGE UP (ref 3.5–5.3)
PROT SERPL-MCNC: 7.2 G/DL — SIGNIFICANT CHANGE UP (ref 6.6–8.7)
PROTHROM AB SERPL-ACNC: 13.7 SEC — HIGH (ref 10.5–13.4)
RBC # BLD: 3.31 M/UL — LOW (ref 4.2–5.8)
RBC # FLD: 13.6 % — SIGNIFICANT CHANGE UP (ref 10.3–14.5)
SARS-COV-2 RNA SPEC QL NAA+PROBE: SIGNIFICANT CHANGE UP
SODIUM SERPL-SCNC: 134 MMOL/L — LOW (ref 135–145)
WBC # BLD: 10.58 K/UL — HIGH (ref 3.8–10.5)
WBC # FLD AUTO: 10.58 K/UL — HIGH (ref 3.8–10.5)

## 2022-11-14 PROCEDURE — U0003: CPT

## 2022-11-14 PROCEDURE — 85610 PROTHROMBIN TIME: CPT

## 2022-11-14 PROCEDURE — 96375 TX/PRO/DX INJ NEW DRUG ADDON: CPT

## 2022-11-14 PROCEDURE — 99285 EMERGENCY DEPT VISIT HI MDM: CPT | Mod: 25

## 2022-11-14 PROCEDURE — 36415 COLL VENOUS BLD VENIPUNCTURE: CPT

## 2022-11-14 PROCEDURE — 96376 TX/PRO/DX INJ SAME DRUG ADON: CPT

## 2022-11-14 PROCEDURE — 73502 X-RAY EXAM HIP UNI 2-3 VIEWS: CPT

## 2022-11-14 PROCEDURE — 73502 X-RAY EXAM HIP UNI 2-3 VIEWS: CPT | Mod: 26,RT

## 2022-11-14 PROCEDURE — 85730 THROMBOPLASTIN TIME PARTIAL: CPT

## 2022-11-14 PROCEDURE — 96374 THER/PROPH/DIAG INJ IV PUSH: CPT

## 2022-11-14 PROCEDURE — 99285 EMERGENCY DEPT VISIT HI MDM: CPT

## 2022-11-14 PROCEDURE — 86901 BLOOD TYPING SEROLOGIC RH(D): CPT

## 2022-11-14 PROCEDURE — 86850 RBC ANTIBODY SCREEN: CPT

## 2022-11-14 PROCEDURE — 86900 BLOOD TYPING SEROLOGIC ABO: CPT

## 2022-11-14 PROCEDURE — 85025 COMPLETE CBC W/AUTO DIFF WBC: CPT

## 2022-11-14 PROCEDURE — U0005: CPT

## 2022-11-14 PROCEDURE — 80053 COMPREHEN METABOLIC PANEL: CPT

## 2022-11-14 RX ORDER — HYDROMORPHONE HYDROCHLORIDE 2 MG/ML
1 INJECTION INTRAMUSCULAR; INTRAVENOUS; SUBCUTANEOUS ONCE
Refills: 0 | Status: DISCONTINUED | OUTPATIENT
Start: 2022-11-14 | End: 2022-11-14

## 2022-11-14 RX ORDER — MORPHINE SULFATE 50 MG/1
4 CAPSULE, EXTENDED RELEASE ORAL ONCE
Refills: 0 | Status: DISCONTINUED | OUTPATIENT
Start: 2022-11-14 | End: 2022-11-14

## 2022-11-14 RX ADMIN — MORPHINE SULFATE 4 MILLIGRAM(S): 50 CAPSULE, EXTENDED RELEASE ORAL at 17:44

## 2022-11-14 RX ADMIN — MORPHINE SULFATE 4 MILLIGRAM(S): 50 CAPSULE, EXTENDED RELEASE ORAL at 19:16

## 2022-11-14 RX ADMIN — HYDROMORPHONE HYDROCHLORIDE 1 MILLIGRAM(S): 2 INJECTION INTRAMUSCULAR; INTRAVENOUS; SUBCUTANEOUS at 21:08

## 2022-11-14 NOTE — ED PROVIDER NOTE - CLINICAL SUMMARY MEDICAL DECISION MAKING FREE TEXT BOX
56yo M w/pmh right ZARI c/b infection, s/p spacer placement, on dual antibiotic therapy, presents for right hip pain. Morphine for pain. Xrays pending.

## 2022-11-14 NOTE — ED PROVIDER NOTE - ATTENDING CONTRIBUTION TO CARE
I personally saw the patient with the resident, and completed the key components of the history and physical exam. I then discussed the management plan with the resident.    pt with hip dislocation requiring open reduction by ortho at [facility] as per ortho here, pt accepted to Dr. Chavez at [facility], transfer initiated

## 2022-11-14 NOTE — ED ADULT NURSE NOTE - OBJECTIVE STATEMENT
57 yom presents to ed c/o right hip pain after moving  slightly. pt recently discharged from hospital on iv antibiotics via picc line.

## 2022-11-14 NOTE — ED PROVIDER NOTE - OBJECTIVE STATEMENT
56yo M w/pmh right ZARI c/b infection, s/p spacer placement, on dual antibiotic therapy, presents for right hip pain. Patient reports he was lying in bed, had hip externally rotated, sat straight up and felt a pop sensation and severe pain, reports it feels out of place. Denies trauma to the area, denies erythema of the surgical site or discharge. Reports taking IV antibiotic and oral antibiotic as prescribed.

## 2022-11-14 NOTE — ED PROVIDER NOTE - PHYSICAL EXAMINATION
General: well appearing, NAD  Head: NC/AT  Cardiac: RRR  Respiratory: equal chest wall expansions, no conversational dyspnea  Neuro: AAOx3, moving b/l UEs and LLE, RLE movement limited 2/2 pain  MSK: +right groin ttp, clean and dry wound dressing; RUE PICC line in place  Psych: calm, cooperative, normal affect  Skin: warm and dry General: well appearing, NAD  Head: NC/AT  Cardiac: RRR  Respiratory: equal chest wall expansions, no conversational dyspnea  Neuro: AAOx3, moving b/l UEs and LLE, RLE movement limited 2/2 pain  MSK: +right groin ttp, clean and dry wound dressing; RUE PICC line in place  Psych: calm, cooperative, normal affect  Skin: warm and dry  DP/PT pulses 2+ bilaterally

## 2022-11-14 NOTE — ED PROVIDER NOTE - PROGRESS NOTE DETAILS
Resident Keiry Todd: reassessed patient, pain persists, additional morphine ordered. Spoke to orthopedics team who spoke to patient's surgeon at [facility], plan for transfer for surgical fixation, tentatively scheduled for Wednesday. Resident Keiry Todd: reassessed patient, pain improved transiently with morphine, now c/o pain again. Dilaudid ordered.

## 2022-11-14 NOTE — ED PROVIDER NOTE - NS ED ROS FT
Constitutional: no fever, no sweats, and no chills.  CV: no chest pain, no edema.  Resp: no cough, no dyspnea  GI: no abdominal pain, no nausea and no vomiting.  MSK: +msk pain, +weakness  Skin: no lesions, and no rashes.  Neuro: no headache, no dizziness  ROS otherwise negative except as noted in HPI. 5-Fu Counseling: 5-Fluorouracil Counseling:  I discussed with the patient the risks of 5-fluorouracil including but not limited to erythema, scaling, itching, weeping, crusting, and pain.

## 2022-11-14 NOTE — ED ADULT TRIAGE NOTE - CHIEF COMPLAINT QUOTE
BIBA from home c/o  R hip dislocation at home " I moved the wrong way and my hip popped "  R  leg shortened

## 2022-11-16 PROCEDURE — 99222 1ST HOSP IP/OBS MODERATE 55: CPT

## 2022-11-17 PROCEDURE — 99233 SBSQ HOSP IP/OBS HIGH 50: CPT

## 2022-11-18 PROCEDURE — 99233 SBSQ HOSP IP/OBS HIGH 50: CPT

## 2022-11-30 ENCOUNTER — APPOINTMENT (OUTPATIENT)
Dept: CARDIOLOGY | Facility: CLINIC | Age: 57
End: 2022-11-30

## 2022-11-30 ENCOUNTER — NON-APPOINTMENT (OUTPATIENT)
Age: 57
End: 2022-11-30

## 2022-11-30 VITALS
DIASTOLIC BLOOD PRESSURE: 77 MMHG | HEART RATE: 85 BPM | OXYGEN SATURATION: 96 % | HEIGHT: 73 IN | SYSTOLIC BLOOD PRESSURE: 114 MMHG | TEMPERATURE: 98 F

## 2022-11-30 PROCEDURE — 93000 ELECTROCARDIOGRAM COMPLETE: CPT

## 2022-11-30 PROCEDURE — 99215 OFFICE O/P EST HI 40 MIN: CPT | Mod: 25

## 2022-11-30 RX ORDER — METOPROLOL TARTRATE 50 MG/1
50 TABLET, FILM COATED ORAL EVERY 6 HOURS
Refills: 0 | Status: DISCONTINUED | COMMUNITY
Start: 2022-11-21 | End: 2022-11-30

## 2022-11-30 RX ORDER — TRIAMCINOLONE ACETONIDE 0.25 MG/G
0.03 CREAM TOPICAL 3 TIMES DAILY
Qty: 1 | Refills: 1 | Status: DISCONTINUED | COMMUNITY
Start: 2022-06-01 | End: 2022-11-30

## 2022-11-30 RX ORDER — ASPIRIN 81 MG
81 TABLET,CHEWABLE ORAL
Refills: 0 | Status: DISCONTINUED | COMMUNITY
End: 2022-11-30

## 2022-11-30 RX ORDER — NYSTATIN 100000 [USP'U]/G
100000 CREAM TOPICAL 3 TIMES DAILY
Qty: 1 | Refills: 1 | Status: DISCONTINUED | COMMUNITY
Start: 2022-06-01 | End: 2022-11-30

## 2022-11-30 RX ORDER — VALSARTAN 80 MG/1
80 TABLET, COATED ORAL
Qty: 90 | Refills: 1 | Status: DISCONTINUED | COMMUNITY
Start: 2022-07-18 | End: 2022-11-30

## 2022-11-30 RX ORDER — DILTIAZEM HYDROCHLORIDE 60 MG/1
60 TABLET ORAL
Qty: 120 | Refills: 0 | Status: DISCONTINUED | COMMUNITY
Start: 2022-11-21 | End: 2022-11-30

## 2022-11-30 RX ORDER — METOPROLOL SUCCINATE 50 MG/1
50 TABLET, EXTENDED RELEASE ORAL
Qty: 90 | Refills: 1 | Status: DISCONTINUED | COMMUNITY
Start: 2022-07-15 | End: 2022-11-30

## 2022-12-19 ENCOUNTER — APPOINTMENT (OUTPATIENT)
Dept: ELECTROPHYSIOLOGY | Facility: CLINIC | Age: 57
End: 2022-12-19

## 2022-12-28 ENCOUNTER — NON-APPOINTMENT (OUTPATIENT)
Age: 57
End: 2022-12-28

## 2023-02-15 ENCOUNTER — NON-APPOINTMENT (OUTPATIENT)
Age: 58
End: 2023-02-15

## 2023-02-15 ENCOUNTER — APPOINTMENT (OUTPATIENT)
Dept: CARDIOLOGY | Facility: CLINIC | Age: 58
End: 2023-02-15
Payer: MEDICAID

## 2023-02-15 VITALS
SYSTOLIC BLOOD PRESSURE: 128 MMHG | TEMPERATURE: 97.7 F | OXYGEN SATURATION: 97 % | HEIGHT: 73 IN | WEIGHT: 235 LBS | HEART RATE: 74 BPM | DIASTOLIC BLOOD PRESSURE: 82 MMHG | BODY MASS INDEX: 31.14 KG/M2

## 2023-02-15 PROCEDURE — 93000 ELECTROCARDIOGRAM COMPLETE: CPT

## 2023-02-15 PROCEDURE — 99215 OFFICE O/P EST HI 40 MIN: CPT | Mod: 25

## 2023-02-15 RX ORDER — RIFAMPIN 300 MG/1
300 CAPSULE ORAL
Qty: 90 | Refills: 0 | Status: DISCONTINUED | COMMUNITY
Start: 2022-11-07 | End: 2023-02-15

## 2023-02-15 RX ORDER — FAMOTIDINE 20 MG/1
20 TABLET, FILM COATED ORAL
Qty: 60 | Refills: 0 | Status: DISCONTINUED | COMMUNITY
Start: 2022-11-07 | End: 2023-02-15

## 2023-02-15 NOTE — REVIEW OF SYSTEMS
[Feeling Fatigued] : feeling fatigued [SOB] : no shortness of breath [Dyspnea on exertion] : not dyspnea during exertion [Chest Discomfort] : no chest discomfort [Lower Ext Edema] : no extremity edema [Leg Claudication] : no intermittent leg claudication [Palpitations] : no palpitations [Orthopnea] : no orthopnea [Syncope] : no syncope [Dizziness] : no dizziness

## 2023-02-15 NOTE — CARDIOLOGY SUMMARY
[de-identified] : 2/15/2023\par Afib  [de-identified] : 06/16/2022 pharm nuclear stress test, no evidence of ischemia  [de-identified] : 06/16/2022 LVEF 50-55%, normal RV size and function, moderate concentric LVH, LA normal in size, RA mildly dilated

## 2023-02-15 NOTE — ASSESSMENT
[FreeTextEntry1] : 57 year old male with history of OA, recent right hip replacement complicated by infection and multiple revisions, Afib, HLD (not on meds) and HTN, who presents to the office for follow up of Afib. Discussed with Dr. Sanchez. \par \par Assessment/ Plan: \par 1. Exertional chest pain with SOB but no exerting himself : Nuclear stress test in June 2022 was negative for ischemia. \par 2. HTN: BP well controlled with BB and Cardizem. \par 3. Afib rate controlled, Chadsvasc  = 1 ( HTN). on ASA 81 mg  \par \par 4. HLD: no history of DM. Dietary modifications discussed. \par \par Patient had possible angina on exertion , stress test was equivocal \par no current angina now \par patient is at elevated risk fo perioperative cardiac events but there is no contraindications to proceed with surgery .

## 2023-02-15 NOTE — HISTORY OF PRESENT ILLNESS
[FreeTextEntry1] : 8/17/2022:\par 57 year old male with history of OA, recent right hip replacement, recently diagnosed Afib, and HTN, who presents to the office for follow up of HTN. His BP was noted to be high during PT and patient had complained of chest tightness at that time. He only started taking the Valsartan 2 days ago. Reports fatigue since his hip surgery. Reports exertional chest tightness, burning in the center of his chest, rated 7-8/10 on verbal pain scale, nonradiating, associated with SOB, resolving after about 5 minutes of rest. States this chest pain has happened months ago then subsided a bit; now, the pain has been occurring more frequently. Denies SOB at rest, palpitations, lightheadedness, dizziness, syncope, near syncope and LE edema. Denies smoking cigarettes, excessive alcohol. Admits to marijuana use.\par \par 11/30/2022:\par Patient presents to the office for follow up of Afib. Since last visit, he has had multiple revisions of his right hip with a persistent infection. He has a right arm PICC for IV antibiotics for at least the next 4-5 weeks. States his HR was as high as 180 when he was recently in the hospital and they adjusted his medications. He was told to stop Aspirin but cannot recall why. Denies chest pain, SOB at rest, PARRA, palpitations, lightheadedness, dizziness, syncope, near syncope and LE edema. He has not been exerting himself; he uses a wheelchair most of the time and walker on occasion. Denies smoking cigarettes, excessive alcohol. Admits to marijuana use.\par \par 2/15/2023\par Had more hip surgeries . he doesn't exert himself and has no chest pain . He uses to have chest pain when he exerts himself\par stress test was equivocal with small fixed defect involving mid inferior wall. \par has uncontrolled AFib after one of the surgeries

## 2023-02-15 NOTE — PHYSICAL EXAM
[No Acute Distress] : no acute distress [Obese] : obese [No Carotid Bruit] : no carotid bruit [Normal S1, S2] : normal S1, S2 [No Murmur] : no murmur [Normal Rate] : normal [Irregularly Irregular] : irregularly irregular [Clear Lung Fields] : clear lung fields [No Respiratory Distress] : no respiratory distress  [Normal Bowel Sounds] : normal bowel sounds [Normal Radial B/L] : normal radial B/L [Normal PT B/L] : normal PT B/L [Edema ___] : edema [unfilled] [Moves all extremities] : moves all extremities [Normal Speech] : normal speech [Alert and Oriented] : alert and oriented [Normal memory] : normal memory [de-identified] : Deferred- wearing a mask  [de-identified] : in wheelchair

## 2023-03-29 ENCOUNTER — APPOINTMENT (OUTPATIENT)
Dept: ULTRASOUND IMAGING | Facility: CLINIC | Age: 58
End: 2023-03-29

## 2023-03-29 ENCOUNTER — APPOINTMENT (OUTPATIENT)
Dept: INTERNAL MEDICINE | Facility: CLINIC | Age: 58
End: 2023-03-29
Payer: MEDICAID

## 2023-03-29 VITALS
OXYGEN SATURATION: 98 % | WEIGHT: 250 LBS | HEIGHT: 73 IN | SYSTOLIC BLOOD PRESSURE: 140 MMHG | HEART RATE: 65 BPM | TEMPERATURE: 97.4 F | BODY MASS INDEX: 33.13 KG/M2 | RESPIRATION RATE: 14 BRPM | DIASTOLIC BLOOD PRESSURE: 84 MMHG

## 2023-03-29 PROCEDURE — 99214 OFFICE O/P EST MOD 30 MIN: CPT

## 2023-03-29 NOTE — HISTORY OF PRESENT ILLNESS
[Atrial Fibrillation] : atrial fibrillation [No Pertinent Pulmonary History] : no history of asthma, COPD, sleep apnea, or smoking [No Adverse Anesthesia Reaction] : no adverse anesthesia reaction in self or family member [(Patient denies any chest pain, claudication, dyspnea on exertion, orthopnea, palpitations or syncope)] : Patient denies any chest pain, claudication, dyspnea on exertion, orthopnea, palpitations or syncope [Excellent (>10 METs)] : Excellent (>10 METs) [Anti-Platelet Agents: _____] : Anti-Platelet Agents: [unfilled] [Aortic Stenosis] : no aortic stenosis [Coronary Artery Disease] : no coronary artery disease [Recent Myocardial Infarction] : no recent myocardial infarction [Implantable Device/Pacemaker] : no implantable device/pacemaker [Chronic Anticoagulation] : no chronic anticoagulation [Chronic Kidney Disease] : no chronic kidney disease [Diabetes] : no diabetes [FreeTextEntry1] : Right Hip  surgery [FreeTextEntry2] : 4/7/23 [FreeTextEntry3] : Dr Jean-Baptiste  [FreeTextEntry4] : -PMH: Moderate LVH (ECHO: 6/2022), AFib, Obesity, H/o Right femur, BPH\par -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 Occasional EtOH use. \par \par ARSLAN is a 57 year M whom is here today for medical clearance\par Today, patient reports feeling well\par Denies fevers or chills\par \par Medical history for the following:\par -AFib: Rate controlled on Toprol and diltiazem.  On aspirin 81 mg

## 2023-03-29 NOTE — ASSESSMENT
[Patient Optimized for Surgery] : Patient optimized for surgery [As per surgery] : as per surgery [No Further Testing Recommended] : no further testing recommended [FreeTextEntry4] : 58yo obese male w/ PMH new onset Rate Controlled AFib on ASA, is here today for medical clearance for upcoming intermediate risk surgery\par Cleared by cardio\par PST Labs/EKG reviewed. Labs WNL. EKG rate controlled afib\par At Increased risk for periop complications. \par Continue all meds including ASA unless instructed otherwise\par DC use of all vitamin supplements & NSAIDs at least 10 days prior to surgery. Tylenol is OK\par Risk and benefit associated with upcoming surgery discussed with patient

## 2023-03-29 NOTE — RESULTS/DATA
[] : results reviewed [de-identified] : WNL [de-identified] : WNL [de-identified] : WNL [de-identified] : AFib [de-identified] : a1c 5.8

## 2023-04-06 ENCOUNTER — RX RENEWAL (OUTPATIENT)
Age: 58
End: 2023-04-06

## 2023-04-13 ENCOUNTER — TRANSCRIPTION ENCOUNTER (OUTPATIENT)
Age: 58
End: 2023-04-13

## 2023-04-14 ENCOUNTER — NON-APPOINTMENT (OUTPATIENT)
Age: 58
End: 2023-04-14

## 2023-04-25 NOTE — ED PROVIDER NOTE - NSFOLLOWUPINSTRUCTIONS_ED_ALL_ED_FT
Routed to covering provider    Patient called for refill on:     lisdexamfetamine (Vyvanse) 20 MG capsule 28 capsule 0 3/30/2023     Sig - Route: Take 1 capsule by mouth every morning. - Oral    ?   Last filled: 3/30/23  Qty: 28  Last seen: 3/30/23  Next visit: 5/25/23    PDMP reviewed; no aberrant behavior identified    
please call and follow up with podiatry   take antibiotic as prescribed buy urgent care if any signs of redness- swollen - pus drainage or any new  Laceration        SEEK IMMEDIATE MEDICAL CARE IF YOU HAVE ANY OF THE FOLLOWING SYMPTOMS: swelling around the wound, worsening pain, drainage from the wound, red streaking going away from your wound, inability to move  toe near the foreign body or discoloration of skin near the laceration.

## 2023-05-30 ENCOUNTER — NON-APPOINTMENT (OUTPATIENT)
Age: 58
End: 2023-05-30

## 2023-05-30 ENCOUNTER — APPOINTMENT (OUTPATIENT)
Dept: CARDIOLOGY | Facility: CLINIC | Age: 58
End: 2023-05-30
Payer: MEDICAID

## 2023-05-30 VITALS
SYSTOLIC BLOOD PRESSURE: 126 MMHG | BODY MASS INDEX: 33.13 KG/M2 | OXYGEN SATURATION: 96 % | WEIGHT: 250 LBS | DIASTOLIC BLOOD PRESSURE: 84 MMHG | HEART RATE: 86 BPM | HEIGHT: 73 IN

## 2023-05-30 DIAGNOSIS — R06.09 OTHER FORMS OF DYSPNEA: ICD-10-CM

## 2023-05-30 DIAGNOSIS — R07.9 CHEST PAIN, UNSPECIFIED: ICD-10-CM

## 2023-05-30 PROCEDURE — 93000 ELECTROCARDIOGRAM COMPLETE: CPT

## 2023-05-30 PROCEDURE — 99215 OFFICE O/P EST HI 40 MIN: CPT | Mod: 25

## 2023-05-30 NOTE — PHYSICAL EXAM
[No Acute Distress] : no acute distress [Obese] : obese [No Carotid Bruit] : no carotid bruit [Normal S1, S2] : normal S1, S2 [No Murmur] : no murmur [Normal Rate] : normal [Irregularly Irregular] : irregularly irregular [Clear Lung Fields] : clear lung fields [No Respiratory Distress] : no respiratory distress  [Normal Bowel Sounds] : normal bowel sounds [Abnormal Gait] : abnormal gait [No Edema] : no edema [Normal Radial B/L] : normal radial B/L [Normal PT B/L] : normal PT B/L [Moves all extremities] : moves all extremities [Normal Speech] : normal speech [Alert and Oriented] : alert and oriented [Normal memory] : normal memory [de-identified] : ambulates with cane

## 2023-05-30 NOTE — HISTORY OF PRESENT ILLNESS
[FreeTextEntry1] : 8/17/2022:\par 57 year old male with history of OA, recent right hip replacement, recently diagnosed Afib, and HTN, who presents to the office for follow up of HTN. His BP was noted to be high during PT and patient had complained of chest tightness at that time. He only started taking the Valsartan 2 days ago. Reports fatigue since his hip surgery. Reports exertional chest tightness, burning in the center of his chest, rated 7-8/10 on verbal pain scale, nonradiating, associated with SOB, resolving after about 5 minutes of rest. States this chest pain has happened months ago then subsided a bit; now, the pain has been occurring more frequently. Denies SOB at rest, palpitations, lightheadedness, dizziness, syncope, near syncope and LE edema. Denies smoking cigarettes, excessive alcohol. Admits to marijuana use.\par \par 11/30/2022:\par Patient presents to the office for follow up of Afib. Since last visit, he has had multiple revisions of his right hip with a persistent infection. He has a right arm PICC for IV antibiotics for at least the next 4-5 weeks. States his HR was as high as 180 when he was recently in the hospital and they adjusted his medications. He was told to stop Aspirin but cannot recall why. Denies chest pain, SOB at rest, PARRA, palpitations, lightheadedness, dizziness, syncope, near syncope and LE edema. He has not been exerting himself; he uses a wheelchair most of the time and walker on occasion. Denies smoking cigarettes, excessive alcohol. Admits to marijuana use.\par \par 2/15/2023\par Had more hip surgeries . he doesn't exert himself and has no chest pain . He uses to have chest pain when he exerts himself\par stress test was equivocal with small fixed defect involving mid inferior wall. \par has uncontrolled AFib after one of the surgeries \par \par 5/30/2023\par Patient here for follow up. He c/o SOB and chest burning with exertion. Last week had symptoms cutting his grass. Improves with rest. He also c/o increased palpitations accompanied by lightheadedness, denies syncope. He has not been very active recently. He had right replacement 7 weeks ago. He states he had 6 hip surgeries in last year due to infections. He reports compliance with his medications.

## 2023-05-30 NOTE — REVIEW OF SYSTEMS
[Feeling Fatigued] : feeling fatigued [SOB] : shortness of breath [Dyspnea on exertion] : dyspnea during exertion [Chest Discomfort] : chest discomfort [Palpitations] : palpitations [Joint Pain] : joint pain [Dizziness] : dizziness [Negative] : Respiratory [Fever] : no fever [Chills] : no chills [Lower Ext Edema] : no extremity edema [Orthopnea] : no orthopnea [PND] : no PND [Syncope] : no syncope [Blood in stool] : no blood in stoo [Hematuria] : no hematuria [FreeTextEntry9] : right hip

## 2023-05-30 NOTE — CARDIOLOGY SUMMARY
[de-identified] : 5/30/2023 Afib, VR=74 bpm\par \par 2/15/2023\par Afib  [de-identified] : 06/16/2022 pharm nuclear stress test, no evidence of ischemia  [de-identified] : 06/16/2022 LVEF 50-55%, normal RV size and function, moderate concentric LVH, LA normal in size, RA mildly dilated

## 2023-05-30 NOTE — DISCUSSION/SUMMARY
[FreeTextEntry1] : Discussed with Dr. Sanchez via telephone. \par \par Plan\par 1. Exertional chest pain with SOB/recurrent anginal symptoms- Nuclear stress test in June 2022 was negative for ischemia but did show fixed defect involving mid inferior wall. Was scheduled for LHC last year but was postponed due to infection. Echocardiogram with EF 55% in June 2022. LHC ordered to evaluate for CAD. TTE ordered to evaluate LVEF and for WMAs, BNP, d-dimer and lipid panel ordered. Pt on ASA, Toprol, and Cardizem. \par 2. HTN- BP well controlled with BB and Cardizem. \par 3. Afib rate controlled, Chadsvasc = 1 ( HTN). On ASA 81 mg daily, Metoprolol Succinate 200 mg daily, and Diltiazem  mg daily. TTE order to assess LVEF/WMAs. Referred again for EP consult for rhythm control strategies.\par 4. HLD- Not on meds. Lipid panel ordered. Evaluate for statins after LHC.  \par 5. Follow up with Dr. Sanchez in 1 month (after testing)\par \par RENE Chen [EKG obtained to assist in diagnosis and management of assessed problem(s)] : EKG obtained to assist in diagnosis and management of assessed problem(s)

## 2023-06-03 ENCOUNTER — APPOINTMENT (OUTPATIENT)
Dept: CARDIOLOGY | Facility: CLINIC | Age: 58
End: 2023-06-03
Payer: MEDICAID

## 2023-06-03 PROCEDURE — 93306 TTE W/DOPPLER COMPLETE: CPT

## 2023-06-06 ENCOUNTER — NON-APPOINTMENT (OUTPATIENT)
Age: 58
End: 2023-06-06

## 2023-06-06 DIAGNOSIS — R79.89 OTHER SPECIFIED ABNORMAL FINDINGS OF BLOOD CHEMISTRY: ICD-10-CM

## 2023-06-06 LAB
CHOLEST SERPL-MCNC: 200 MG/DL
DEPRECATED D DIMER PPP IA-ACNC: 851 NG/ML DDU
HDLC SERPL-MCNC: 33 MG/DL
LDLC SERPL CALC-MCNC: 135 MG/DL
NONHDLC SERPL-MCNC: 167 MG/DL
NT-PROBNP SERPL-MCNC: 1413 PG/ML
TRIGL SERPL-MCNC: 157 MG/DL

## 2023-06-07 ENCOUNTER — INPATIENT (INPATIENT)
Facility: HOSPITAL | Age: 58
LOS: 0 days | Discharge: LEFT BEFORE DISCHARGE | DRG: 311 | End: 2023-06-07
Attending: HOSPITALIST | Admitting: HOSPITALIST
Payer: COMMERCIAL

## 2023-06-07 VITALS
RESPIRATION RATE: 16 BRPM | HEIGHT: 73 IN | WEIGHT: 264.55 LBS | OXYGEN SATURATION: 97 % | SYSTOLIC BLOOD PRESSURE: 119 MMHG | DIASTOLIC BLOOD PRESSURE: 75 MMHG | TEMPERATURE: 98 F | HEART RATE: 52 BPM

## 2023-06-07 VITALS
OXYGEN SATURATION: 98 % | HEART RATE: 58 BPM | RESPIRATION RATE: 18 BRPM | SYSTOLIC BLOOD PRESSURE: 139 MMHG | DIASTOLIC BLOOD PRESSURE: 82 MMHG

## 2023-06-07 DIAGNOSIS — I20.0 UNSTABLE ANGINA: ICD-10-CM

## 2023-06-07 DIAGNOSIS — I48.20 CHRONIC ATRIAL FIBRILLATION, UNSPECIFIED: ICD-10-CM

## 2023-06-07 LAB
ALBUMIN SERPL ELPH-MCNC: 4 G/DL — SIGNIFICANT CHANGE UP (ref 3.3–5.2)
ALP SERPL-CCNC: 150 U/L — HIGH (ref 40–120)
ALT FLD-CCNC: 16 U/L — SIGNIFICANT CHANGE UP
ANION GAP SERPL CALC-SCNC: 10 MMOL/L — SIGNIFICANT CHANGE UP (ref 5–17)
AST SERPL-CCNC: 20 U/L — SIGNIFICANT CHANGE UP
BASOPHILS # BLD AUTO: 0.04 K/UL — SIGNIFICANT CHANGE UP (ref 0–0.2)
BASOPHILS NFR BLD AUTO: 0.7 % — SIGNIFICANT CHANGE UP (ref 0–2)
BILIRUB SERPL-MCNC: <0.2 MG/DL — LOW (ref 0.4–2)
BUN SERPL-MCNC: 19.7 MG/DL — SIGNIFICANT CHANGE UP (ref 8–20)
CALCIUM SERPL-MCNC: 9.3 MG/DL — SIGNIFICANT CHANGE UP (ref 8.4–10.5)
CHLORIDE SERPL-SCNC: 103 MMOL/L — SIGNIFICANT CHANGE UP (ref 96–108)
CO2 SERPL-SCNC: 23 MMOL/L — SIGNIFICANT CHANGE UP (ref 22–29)
CREAT SERPL-MCNC: 1 MG/DL — SIGNIFICANT CHANGE UP (ref 0.5–1.3)
EGFR: 89 ML/MIN/1.73M2 — SIGNIFICANT CHANGE UP
EOSINOPHIL # BLD AUTO: 0.15 K/UL — SIGNIFICANT CHANGE UP (ref 0–0.5)
EOSINOPHIL NFR BLD AUTO: 2.6 % — SIGNIFICANT CHANGE UP (ref 0–6)
GLUCOSE SERPL-MCNC: 136 MG/DL — HIGH (ref 70–99)
HCT VFR BLD CALC: 35 % — LOW (ref 39–50)
HGB BLD-MCNC: 11 G/DL — LOW (ref 13–17)
IMM GRANULOCYTES NFR BLD AUTO: 0.5 % — SIGNIFICANT CHANGE UP (ref 0–0.9)
LYMPHOCYTES # BLD AUTO: 1.12 K/UL — SIGNIFICANT CHANGE UP (ref 1–3.3)
LYMPHOCYTES # BLD AUTO: 19.6 % — SIGNIFICANT CHANGE UP (ref 13–44)
MCHC RBC-ENTMCNC: 24.6 PG — LOW (ref 27–34)
MCHC RBC-ENTMCNC: 31.4 GM/DL — LOW (ref 32–36)
MCV RBC AUTO: 78.1 FL — LOW (ref 80–100)
MONOCYTES # BLD AUTO: 0.56 K/UL — SIGNIFICANT CHANGE UP (ref 0–0.9)
MONOCYTES NFR BLD AUTO: 9.8 % — SIGNIFICANT CHANGE UP (ref 2–14)
NEUTROPHILS # BLD AUTO: 3.81 K/UL — SIGNIFICANT CHANGE UP (ref 1.8–7.4)
NEUTROPHILS NFR BLD AUTO: 66.8 % — SIGNIFICANT CHANGE UP (ref 43–77)
PLATELET # BLD AUTO: 255 K/UL — SIGNIFICANT CHANGE UP (ref 150–400)
POTASSIUM SERPL-MCNC: 4.2 MMOL/L — SIGNIFICANT CHANGE UP (ref 3.5–5.3)
POTASSIUM SERPL-SCNC: 4.2 MMOL/L — SIGNIFICANT CHANGE UP (ref 3.5–5.3)
PROT SERPL-MCNC: 7.3 G/DL — SIGNIFICANT CHANGE UP (ref 6.6–8.7)
RBC # BLD: 4.48 M/UL — SIGNIFICANT CHANGE UP (ref 4.2–5.8)
RBC # FLD: 16 % — HIGH (ref 10.3–14.5)
SODIUM SERPL-SCNC: 136 MMOL/L — SIGNIFICANT CHANGE UP (ref 135–145)
WBC # BLD: 5.71 K/UL — SIGNIFICANT CHANGE UP (ref 3.8–10.5)
WBC # FLD AUTO: 5.71 K/UL — SIGNIFICANT CHANGE UP (ref 3.8–10.5)

## 2023-06-07 PROCEDURE — 80053 COMPREHEN METABOLIC PANEL: CPT

## 2023-06-07 PROCEDURE — 93005 ELECTROCARDIOGRAM TRACING: CPT

## 2023-06-07 PROCEDURE — G1004: CPT

## 2023-06-07 PROCEDURE — 99285 EMERGENCY DEPT VISIT HI MDM: CPT

## 2023-06-07 PROCEDURE — 96372 THER/PROPH/DIAG INJ SC/IM: CPT

## 2023-06-07 PROCEDURE — 99223 1ST HOSP IP/OBS HIGH 75: CPT

## 2023-06-07 PROCEDURE — 99284 EMERGENCY DEPT VISIT MOD MDM: CPT

## 2023-06-07 PROCEDURE — 93010 ELECTROCARDIOGRAM REPORT: CPT

## 2023-06-07 PROCEDURE — 93970 EXTREMITY STUDY: CPT | Mod: 26

## 2023-06-07 PROCEDURE — 99291 CRITICAL CARE FIRST HOUR: CPT

## 2023-06-07 PROCEDURE — 71275 CT ANGIOGRAPHY CHEST: CPT | Mod: 26,ME

## 2023-06-07 PROCEDURE — 85025 COMPLETE CBC W/AUTO DIFF WBC: CPT

## 2023-06-07 PROCEDURE — 93970 EXTREMITY STUDY: CPT

## 2023-06-07 PROCEDURE — 36415 COLL VENOUS BLD VENIPUNCTURE: CPT

## 2023-06-07 PROCEDURE — 71275 CT ANGIOGRAPHY CHEST: CPT | Mod: ME

## 2023-06-07 RX ORDER — DILTIAZEM HCL 120 MG
120 CAPSULE, EXT RELEASE 24 HR ORAL DAILY
Refills: 0 | Status: DISCONTINUED | OUTPATIENT
Start: 2023-06-07 | End: 2023-06-07

## 2023-06-07 RX ORDER — SODIUM CHLORIDE 9 MG/ML
1000 INJECTION INTRAMUSCULAR; INTRAVENOUS; SUBCUTANEOUS ONCE
Refills: 0 | Status: COMPLETED | OUTPATIENT
Start: 2023-06-07 | End: 2023-06-07

## 2023-06-07 RX ORDER — ATORVASTATIN CALCIUM 80 MG/1
40 TABLET, FILM COATED ORAL AT BEDTIME
Refills: 0 | Status: DISCONTINUED | OUTPATIENT
Start: 2023-06-07 | End: 2023-06-07

## 2023-06-07 RX ORDER — ASPIRIN/CALCIUM CARB/MAGNESIUM 324 MG
81 TABLET ORAL DAILY
Refills: 0 | Status: DISCONTINUED | OUTPATIENT
Start: 2023-06-07 | End: 2023-06-07

## 2023-06-07 RX ORDER — ENOXAPARIN SODIUM 100 MG/ML
40 INJECTION SUBCUTANEOUS ONCE
Refills: 0 | Status: COMPLETED | OUTPATIENT
Start: 2023-06-07 | End: 2023-06-07

## 2023-06-07 RX ORDER — METOPROLOL TARTRATE 50 MG
50 TABLET ORAL
Refills: 0 | Status: DISCONTINUED | OUTPATIENT
Start: 2023-06-07 | End: 2023-06-07

## 2023-06-07 RX ORDER — ACETAMINOPHEN 500 MG
650 TABLET ORAL EVERY 6 HOURS
Refills: 0 | Status: DISCONTINUED | OUTPATIENT
Start: 2023-06-07 | End: 2023-06-07

## 2023-06-07 RX ADMIN — Medication 120 MILLIGRAM(S): at 13:28

## 2023-06-07 RX ADMIN — SODIUM CHLORIDE 1000 MILLILITER(S): 9 INJECTION INTRAMUSCULAR; INTRAVENOUS; SUBCUTANEOUS at 08:11

## 2023-06-07 RX ADMIN — ENOXAPARIN SODIUM 40 MILLIGRAM(S): 100 INJECTION SUBCUTANEOUS at 13:29

## 2023-06-07 RX ADMIN — Medication 81 MILLIGRAM(S): at 13:28

## 2023-06-07 NOTE — ED PROVIDER NOTE - CLINICAL SUMMARY MEDICAL DECISION MAKING FREE TEXT BOX
labs and diagnostic imaging results reviewed with patient who is scheduled for cardiac work up tomorrow; patient feels comfortable with discharge and medical plan; PMD or clinic follow up recommended for reassessment. Patient is aware of signs/symptoms to return to the emergency department. labs and diagnostic imaging results reviewed; cardiology assessment noted; admit for emergent PCI

## 2023-06-07 NOTE — CHART NOTE - NSCHARTNOTEFT_GEN_A_CORE
Patient requesting to sign out AMA. Patient reports "I can't stay in the hallway, I will come back tomorrow".     Patient is alert and oriented x3 and has capacity to make decisions. I explained at length to the Patient the risks of signing out AMA including but not limited to harm, injury or death. I explained the risks, benefits and alternatives to treatment as well as the risks of refusing treatment at this time. I offered to answer any questions and fully addressed concerns and answered any such questions. Patient fully understands what has been explained and answered. Patient scheduled for outpatient cardiac cath tomorrow, confirmed with cath lab patient is still on the schedule. Patient advised to return to the hospital with any new or worsening symptoms. Attending aware of above. Patient signed form to sign out AMA and he accepts responsibility for any and all results of this decision.

## 2023-06-07 NOTE — ED ADULT TRIAGE NOTE - GLASGOW COMA SCALE: EYE OPENING, MLM
[Vomiting] : no vomiting [Constipation] : constipation [Reflux/Heartburn] : no reflux/ heartburn [Negative] : Allergic/Immunologic (E4) spontaneous

## 2023-06-07 NOTE — CONSULT NOTE ADULT - SUBJECTIVE AND OBJECTIVE BOX
[facility]                                              CARDIOLOGY AT [place] [address]                                             Telephone: [phone]. Fax:[phone]                                                        CARDIOLOGY CONSULTATION NOTE                                                                                             History obtained by: Patient and medical record  Community Cardiologist: Laura   obtained: Yes [  ] No [ x ]  Reason for Consultation: Unstable Angina  Available out pt records reviewed: Yes [  ] No [ x ]    Chief complaint:    Patient is a 57y old  Male who presents with a chief complaint of Chest pain     HPI:  This is a 57 yr old M w/ PMH of Afib not on AC as per pt due to multiple surgeries (more likely due to CHADSVASC 1), HTN, HLD, who has a few week history of unstable anginal symptoms worse when exerting himself. DDIMER was done in office which was >800 concerning for VTE. LE doppler negative, CTA Chest negative for PE. Plan for LHC tomorrow AM as planned from outpatient office.     CARDIAC TESTING   ECHO: Study Date: 6/3/2023 - transcribed from outpatient chart   CONCLUSIONS:  1. The left ventricular systolic function is normal with an ejection fraction of 52 % by Hampton's method of  disks with an ejection fraction visually estimated at 55 to 60 %.  2. Hypermobile interatrial septum.  3. Mild calcification of the aortic valve leaflets.  4. Mild left ventricular hypertrophy.  5. The right atrium is moderately dilated.  6. Moderately enlarged right ventricular cavity size.  7. No pericardial effusion seen.  8. Unable to assess left ventricular diastolic function due to insufficient data.  9. Compared to the transthoracic echocardiogram performed on 10/30/2022 aortic root measures 4.3cm  from 4.0cm.      PAST MEDICAL HISTORY  HTN (hypertension)    PAST SURGICAL HISTORY      SOCIAL HISTORY:  Denies smoking/alcohol/drugs  CIGARETTES:     ALCOHOL:  DRUGS:    FAMILY HISTORY:  No pertinent family history in first degree relatives    Family History of Cardiovascular Disease:  Yes [  ] No [ x ]  Coronary Artery Disease in first degree relative: Yes [  ] No [ x ]  Sudden Cardiac Death in First degree relative: Yes [  ] No [ x ]    ALLERGIES:   No Known Allergies    REVIEW OF SYMPTOMS:   CONSTITUTIONAL: No fever, no chills, no weight loss, no weight gain, no fatigue   ENMT:  No vertigo; No sinus or throat pain  NECK: No pain or stiffness  CARDIOVASCULAR: No chest pain, no dyspnea, no syncope/presyncope, no palpitations, no dizziness, no Orthopnea, no Paroxsymal nocturnal dyspnea  RESPIRATORY: no Shortness of breath, no cough, no wheezing  : No dysuria, no hematuria   GI: No dark color stool, no nausea, no diarrhea, no constipation, no abdominal pain   NEURO: No headache, no slurred speech   MUSCULOSKELETAL: No joint pain or swelling; No muscle, back, or extremity pain  PSYCH: No agitation, no anxiety.    ALL OTHER REVIEW OF SYSTEMS ARE NEGATIVE.    VITAL SIGNS:  T(C): 36.8 (06-07-23 @ 07:18), Max: 36.8 (06-07-23 @ 07:18)  T(F): 98.2 (06-07-23 @ 07:18), Max: 98.2 (06-07-23 @ 07:18)  HR: 52 (06-07-23 @ 07:18) (52 - 52)  BP: 119/75 (06-07-23 @ 07:18) (119/75 - 119/75)  RR: 16 (06-07-23 @ 07:18) (16 - 16)  SpO2: 97% (06-07-23 @ 07:18) (97% - 97%)    PHYSICAL EXAM:  Constitutional: Comfortable . No acute distress.   HEENT: Atraumatic and normocephalic , neck is supple . no JVD. No carotid bruit.  CNS: A&Ox3. No focal deficits.   Respiratory: CTAB, unlabored   Cardiovascular: RRR normal s1 s2. No murmur. No rubs or gallop.  Gastrointestinal: Soft, non-tender. +Bowel sounds.   Extremities: 2+ Peripheral Pulses, No clubbing, cyanosis, or edema  Psychiatric: Calm . no agitation.   Skin: Warm and dry, no ulcers on extremities                           11.0   5.71  )-----------( 255      ( 07 Jun 2023 07:57 )             35.0     06-07    136  |  103  |  19.7  ----------------------------<  136<H>  4.2   |  23.0  |  1.00    Ca    9.3      07 Jun 2023 07:57    TPro  7.3  /  Alb  4.0  /  TBili  <0.2<L>  /  DBili  x   /  AST  20  /  ALT  16  /  AlkPhos  150<H>  06-07    < from: 12 Lead ECG (01.15.20 @ 21:45) >  Ventricular Rate 58 BPM    Atrial Rate 58 BPM    P-R Interval 170 ms    QRS Duration 86 ms    Q-T Interval 442 ms    QTC Calculation(Bezet) 433 ms    P Axis 12 degrees    R Axis 26 degrees    T Axis 47 degrees    Diagnosis Line *** Poor data quality, interpretation may be adversely affected  Sinus bradycardia  Otherwise normal ECG    Confirmed by SOLEDAD MISHRA ([id number]) on 1/16/2020 11:42:54 PM    < end of copied text >

## 2023-06-07 NOTE — CONSULT NOTE ADULT - PROBLEM SELECTOR RECOMMENDATION 9
- known unstable angina CCS3  - chest pain with exertional activities such as mowing lawn   - risk factors - HTN, Obesity, HLD   - DDIMER elevated as outpatient, VTE workup negative, no DVT or PE   - elevated ddimer likely in setting of inflammation from recent surgery   - pt has outpatient LHC scheduled for tomorrow, will keep inpatient for [facility] tomorrow AM   - no need for heparin infusion ACS protocol   - continue ASA, and toprol   - cut toprol in half to 100mg daily for profound slow ventricular response 30s w/ 3 second pauses   - cut cardizem CD in half to 120mg daily as well   - goal HR is <115 sustained, but also want to avoid profound bradycardia   - continue aspriin 81mg daily  - start lipitor 40mg daily, elevated lipids as outpatient  - admit to telemetry, discussed w/ Dr. Carpenter  - keep NPO @ [facility] for [facility] in AM w/ Dr. Sanchez  - Cardiology will follow

## 2023-06-07 NOTE — H&P ADULT - NSHPSOCIALHISTORY_GEN_ALL_CORE
Positive for marijuana use  Denied alcohol use  Lives at home with his girlfriend    PMH: Hypertension, Atrial fibrillation  PSH: Multiple right hip surgeries

## 2023-06-07 NOTE — H&P ADULT - NSHPPHYSICALEXAM_GEN_ALL_CORE
Vital Signs Last 24 Hrs  T(C): 36.7 (07 Jun 2023 10:57), Max: 36.8 (07 Jun 2023 07:18)  T(F): 98.1 (07 Jun 2023 10:57), Max: 98.2 (07 Jun 2023 07:18)  HR: 62 (07 Jun 2023 10:57) (52 - 62)  BP: 121/74 (07 Jun 2023 10:57) (119/75 - 121/74)  BP(mean): --  RR: 18 (07 Jun 2023 10:57) (16 - 18)  SpO2: 99% (07 Jun 2023 10:57) (97% - 99%)    Parameters below as of 07 Jun 2023 10:57  Patient On (Oxygen Delivery Method): room air    General appearance: No acute distress, Awake, Alert  HEENT: Normocephalic, Atraumatic, Conjunctiva clear, EOMI  Neck: Supple, No JVD, No tenderness  Lungs: Breath sound equal bilaterally, No wheezes, No rales  Cardiovascular: S1S2, Irregular rhythm  Abdomen: Soft, Nontender, Nondistended, No guarding/rebound, Positive bowel sounds  Extremities: No clubbing, No cyanosis, No edema, No calf tenderness  Neuro: Strength equal bilaterally, No tremors  Psychiatric: Appropriate mood, Normal affect

## 2023-06-07 NOTE — H&P ADULT - HISTORY OF PRESENT ILLNESS
57M referred to the hospital with episodes of exertional chest pain. The patient had reported episodes of chest discomfort for the past few months described as burning in sensation and located in the center of the chest and nonradiating. There was associated dyspnea and palpitations as well as mild lightheadedness but no loss of consciousness. The symptoms occurred with exertion and was relieved with rest. The patient also reported a history of multiple hip surgeries in the past year and is not beginning to walk more which has led to further episodes of chest pain. He denied any associated lower extremity edema or orthopnea. There were no recent fevers, chills, or sick contacts.

## 2023-06-07 NOTE — ED ADULT NURSE NOTE - NSFALLUNIVINTERV_ED_ALL_ED
6 Bed/Stretcher in lowest position, wheels locked, appropriate side rails in place/Call bell, personal items and telephone in reach/Instruct patient to call for assistance before getting out of bed/chair/stretcher/Non-slip footwear applied when patient is off stretcher/[place] to call system/Physically safe environment - no spills, clutter or unnecessary equipment/Purposeful proactive rounding/Room/bathroom lighting operational, light cord in reach

## 2023-06-07 NOTE — CONSULT NOTE ADULT - NS ATTEND AMEND GEN_ALL_CORE FT
Patient seen and examined by me.    T(C): 36.7 (06-07-23 @ 10:57), Max: 36.8 (06-07-23 @ 07:18)  HR: 58 (06-07-23 @ 13:15) (52 - 62)  BP: 139/82 (06-07-23 @ 13:15) (119/75 - 139/82)  RR: 18 (06-07-23 @ 13:15) (16 - 18)  SpO2: 98% (06-07-23 @ 13:15) (97% - 99%)  Patient alert and awake.  Chest- Bilateral Clear BS  Cardiac- S1 and S2  Abdomen- Soft    Assessment:  1. Unstable Angina      Recommendations:  For [facility] on 6/8/2023      I have discussed my recommendation with the PA which are outlined above.  Will follow Yes

## 2023-06-07 NOTE — ED ADULT TRIAGE NOTE - CHIEF COMPLAINT QUOTE
Pt sent to ED to r/o blood clots or PE.  Pt scheduled to have angiogram tomorrow; blood work yesterday showed "elevated levels of this and elevated levels of that so they want me to come early and get tested for blood clots."  Pt denies chest pain or sob on arrival but endorsing chest pain on exertion.  Pt Afib and bradycardic in triage

## 2023-06-07 NOTE — ED ADULT NURSE NOTE - OBJECTIVE STATEMENT
pt is scheduled for cardiac angiogram and pre surgical testing showed blood work concerning for PE. pt denies any complaints at this time. pt is in afib on the monitor that is controlled.

## 2023-06-07 NOTE — ED ADULT NURSE NOTE - PRO INTERPRETER NEED 2
English

## 2023-06-07 NOTE — H&P ADULT - ASSESSMENT
57M with a history of hypertension, atrial fibrillation, and multiple hip surgeries who presented with exertional chest discomfort.    Chest discomfort / Acute coronary syndrome - On aspirin. Initial troponin level was not elevated. Discussed with Cardiology. The patient was noted to have a negative stress test last year. For cardiac catheterization tomorrow. Monitoring on telemetry.    Atrial fibrillation - The patient was on aspirin. To assess need for anticoagulation. On lower dose of metoprolol and diltiazem due to bradycardia.    Hypertension - Close blood pressure monitoring. On metoprolol and diltiazem. 57M with a history of hypertension, atrial fibrillation, and multiple hip surgeries who presented with exertional chest discomfort.    Chest discomfort / Acute coronary syndrome - On aspirin. Initial troponin level was not elevated. CT angiogram of the chest was without pulmonary emboli. Discussed with Cardiology. The patient was noted to have a negative stress test last year. For cardiac catheterization tomorrow. Monitoring on telemetry.    Atrial fibrillation - The patient was on aspirin. To assess need for anticoagulation. On lower dose of metoprolol and diltiazem due to bradycardia.    Hypertension - Close blood pressure monitoring. On metoprolol and diltiazem.

## 2023-06-07 NOTE — ED PROVIDER NOTE - ATTESTATION, MLM
Patient refused I have reviewed and confirmed nurses' notes for patient's medications, allergies, medical history, and surgical history.

## 2023-06-07 NOTE — CONSULT NOTE ADULT - ASSESSMENT
This is a 57 yr old M w/ PMH of Afib not on AC as per pt due to multiple surgeries (more likely due to CHADSVASC 1), HTN, HLD, who has a few week history of unstable anginal symptoms worse when exerting himself. DDIMER was done in office which was >800 concerning for VTE. LE doppler negative, CTA Chest negative for PE. Plan for LHC tomorrow AM as planned from outpatient office.

## 2023-06-07 NOTE — ED PROVIDER NOTE - CONSIDERATION OF ADMISSION OBSERVATION
intermittent chest pain, elevated HEART score, cardiology recommendation Consideration of Admission/Observation

## 2023-06-07 NOTE — CONSULT NOTE ADULT - PROBLEM SELECTOR RECOMMENDATION 2
- as above  - will need to r/o DM, check Hgb A1c because fasting blood glucose is 136  - if DM, CHADSVASC would then be 2 and would need to consider AC w/ DOAC

## 2023-06-08 ENCOUNTER — INPATIENT (INPATIENT)
Facility: HOSPITAL | Age: 58
LOS: 7 days | Discharge: HOME HEALTH | DRG: 234 | End: 2023-06-16
Attending: THORACIC SURGERY (CARDIOTHORACIC VASCULAR SURGERY) | Admitting: THORACIC SURGERY (CARDIOTHORACIC VASCULAR SURGERY)
Payer: COMMERCIAL

## 2023-06-08 ENCOUNTER — TRANSCRIPTION ENCOUNTER (OUTPATIENT)
Age: 58
End: 2023-06-08

## 2023-06-08 VITALS
DIASTOLIC BLOOD PRESSURE: 93 MMHG | SYSTOLIC BLOOD PRESSURE: 136 MMHG | RESPIRATION RATE: 26 BRPM | OXYGEN SATURATION: 98 % | WEIGHT: 263.01 LBS | HEIGHT: 73 IN | HEART RATE: 75 BPM

## 2023-06-08 DIAGNOSIS — I25.110 ATHEROSCLEROTIC HEART DISEASE OF NATIVE CORONARY ARTERY WITH UNSTABLE ANGINA PECTORIS: ICD-10-CM

## 2023-06-08 DIAGNOSIS — Z96.641 PRESENCE OF RIGHT ARTIFICIAL HIP JOINT: Chronic | ICD-10-CM

## 2023-06-08 DIAGNOSIS — R07.9 CHEST PAIN, UNSPECIFIED: ICD-10-CM

## 2023-06-08 DIAGNOSIS — I10 ESSENTIAL (PRIMARY) HYPERTENSION: ICD-10-CM

## 2023-06-08 DIAGNOSIS — I48.91 UNSPECIFIED ATRIAL FIBRILLATION: ICD-10-CM

## 2023-06-08 LAB
A1C WITH ESTIMATED AVERAGE GLUCOSE RESULT: 5.5 % — SIGNIFICANT CHANGE UP (ref 4–5.6)
ALBUMIN SERPL ELPH-MCNC: 3.7 G/DL — SIGNIFICANT CHANGE UP (ref 3.3–5.2)
ALP SERPL-CCNC: 144 U/L — HIGH (ref 40–120)
ALT FLD-CCNC: 17 U/L — SIGNIFICANT CHANGE UP
ANION GAP SERPL CALC-SCNC: 11 MMOL/L — SIGNIFICANT CHANGE UP (ref 5–17)
APPEARANCE UR: CLEAR — SIGNIFICANT CHANGE UP
APTT BLD: 29.6 SEC — SIGNIFICANT CHANGE UP (ref 27.5–35.5)
AST SERPL-CCNC: 20 U/L — SIGNIFICANT CHANGE UP
BACTERIA # UR AUTO: ABNORMAL
BASOPHILS # BLD AUTO: 0.02 K/UL — SIGNIFICANT CHANGE UP (ref 0–0.2)
BASOPHILS NFR BLD AUTO: 0.7 % — SIGNIFICANT CHANGE UP (ref 0–2)
BILIRUB SERPL-MCNC: 0.2 MG/DL — LOW (ref 0.4–2)
BILIRUB UR-MCNC: NEGATIVE — SIGNIFICANT CHANGE UP
BLD GP AB SCN SERPL QL: SIGNIFICANT CHANGE UP
BUN SERPL-MCNC: 14 MG/DL — SIGNIFICANT CHANGE UP (ref 8–20)
CALCIUM SERPL-MCNC: 8.8 MG/DL — SIGNIFICANT CHANGE UP (ref 8.4–10.5)
CHLORIDE SERPL-SCNC: 105 MMOL/L — SIGNIFICANT CHANGE UP (ref 96–108)
CHOLEST SERPL-MCNC: 172 MG/DL — SIGNIFICANT CHANGE UP
CO2 SERPL-SCNC: 24 MMOL/L — SIGNIFICANT CHANGE UP (ref 22–29)
COLOR SPEC: YELLOW — SIGNIFICANT CHANGE UP
CREAT SERPL-MCNC: 0.94 MG/DL — SIGNIFICANT CHANGE UP (ref 0.5–1.3)
DIFF PNL FLD: ABNORMAL
EGFR: 96 ML/MIN/1.73M2 — SIGNIFICANT CHANGE UP
EOSINOPHIL # BLD AUTO: 0.09 K/UL — SIGNIFICANT CHANGE UP (ref 0–0.5)
EOSINOPHIL NFR BLD AUTO: 3.2 % — SIGNIFICANT CHANGE UP (ref 0–6)
EPI CELLS # UR: SIGNIFICANT CHANGE UP
ESTIMATED AVERAGE GLUCOSE: 111 MG/DL — SIGNIFICANT CHANGE UP (ref 68–114)
GLUCOSE SERPL-MCNC: 116 MG/DL — HIGH (ref 70–99)
GLUCOSE UR QL: NEGATIVE MG/DL — SIGNIFICANT CHANGE UP
HCT VFR BLD CALC: 33.6 % — LOW (ref 39–50)
HDLC SERPL-MCNC: 26 MG/DL — LOW
HGB BLD-MCNC: 10.6 G/DL — LOW (ref 13–17)
IMM GRANULOCYTES NFR BLD AUTO: 0.4 % — SIGNIFICANT CHANGE UP (ref 0–0.9)
INR BLD: 1.1 RATIO — SIGNIFICANT CHANGE UP (ref 0.88–1.16)
KETONES UR-MCNC: NEGATIVE — SIGNIFICANT CHANGE UP
LEUKOCYTE ESTERASE UR-ACNC: NEGATIVE — SIGNIFICANT CHANGE UP
LIPID PNL WITH DIRECT LDL SERPL: 118 MG/DL — HIGH
LYMPHOCYTES # BLD AUTO: 0.93 K/UL — LOW (ref 1–3.3)
LYMPHOCYTES # BLD AUTO: 32.6 % — SIGNIFICANT CHANGE UP (ref 13–44)
MAGNESIUM SERPL-MCNC: 2.1 MG/DL — SIGNIFICANT CHANGE UP (ref 1.6–2.6)
MCHC RBC-ENTMCNC: 24.3 PG — LOW (ref 27–34)
MCHC RBC-ENTMCNC: 31.5 GM/DL — LOW (ref 32–36)
MCV RBC AUTO: 77.1 FL — LOW (ref 80–100)
MONOCYTES # BLD AUTO: 0.36 K/UL — SIGNIFICANT CHANGE UP (ref 0–0.9)
MONOCYTES NFR BLD AUTO: 12.6 % — SIGNIFICANT CHANGE UP (ref 2–14)
NEUTROPHILS # BLD AUTO: 1.44 K/UL — LOW (ref 1.8–7.4)
NEUTROPHILS NFR BLD AUTO: 50.5 % — SIGNIFICANT CHANGE UP (ref 43–77)
NITRITE UR-MCNC: NEGATIVE — SIGNIFICANT CHANGE UP
NON HDL CHOLESTEROL: 146 MG/DL — HIGH
NT-PROBNP SERPL-SCNC: 1004 PG/ML — HIGH (ref 0–300)
PA ADP PRP-ACNC: 307 PRU — SIGNIFICANT CHANGE UP (ref 180–376)
PH UR: 5 — SIGNIFICANT CHANGE UP (ref 5–8)
PLATELET # BLD AUTO: 191 K/UL — SIGNIFICANT CHANGE UP (ref 150–400)
POTASSIUM SERPL-MCNC: 3.6 MMOL/L — SIGNIFICANT CHANGE UP (ref 3.5–5.3)
POTASSIUM SERPL-SCNC: 3.6 MMOL/L — SIGNIFICANT CHANGE UP (ref 3.5–5.3)
PREALB SERPL-MCNC: 22 MG/DL — SIGNIFICANT CHANGE UP (ref 18–38)
PROT SERPL-MCNC: 7 G/DL — SIGNIFICANT CHANGE UP (ref 6.6–8.7)
PROT UR-MCNC: NEGATIVE — SIGNIFICANT CHANGE UP
PROTHROM AB SERPL-ACNC: 12.8 SEC — SIGNIFICANT CHANGE UP (ref 10.5–13.4)
RBC # BLD: 4.36 M/UL — SIGNIFICANT CHANGE UP (ref 4.2–5.8)
RBC # FLD: 15.9 % — HIGH (ref 10.3–14.5)
RBC CASTS # UR COMP ASSIST: SIGNIFICANT CHANGE UP /HPF (ref 0–4)
SODIUM SERPL-SCNC: 140 MMOL/L — SIGNIFICANT CHANGE UP (ref 135–145)
SP GR SPEC: 1.01 — SIGNIFICANT CHANGE UP (ref 1.01–1.02)
TRIGL SERPL-MCNC: 141 MG/DL — SIGNIFICANT CHANGE UP
TROPONIN T SERPL-MCNC: <0.01 NG/ML — SIGNIFICANT CHANGE UP (ref 0–0.06)
TSH SERPL-MCNC: 3.09 UIU/ML — SIGNIFICANT CHANGE UP (ref 0.27–4.2)
UROBILINOGEN FLD QL: NEGATIVE MG/DL — SIGNIFICANT CHANGE UP
WBC # BLD: 2.85 K/UL — LOW (ref 3.8–10.5)
WBC # FLD AUTO: 2.85 K/UL — LOW (ref 3.8–10.5)
WBC UR QL: SIGNIFICANT CHANGE UP /HPF (ref 0–5)

## 2023-06-08 PROCEDURE — 99291 CRITICAL CARE FIRST HOUR: CPT

## 2023-06-08 PROCEDURE — 99152 MOD SED SAME PHYS/QHP 5/>YRS: CPT

## 2023-06-08 PROCEDURE — 93880 EXTRACRANIAL BILAT STUDY: CPT | Mod: 26

## 2023-06-08 PROCEDURE — 92978 ENDOLUMINL IVUS OCT C 1ST: CPT | Mod: 26,LD

## 2023-06-08 PROCEDURE — 93458 L HRT ARTERY/VENTRICLE ANGIO: CPT | Mod: 26

## 2023-06-08 RX ORDER — ATORVASTATIN CALCIUM 80 MG/1
80 TABLET, FILM COATED ORAL AT BEDTIME
Refills: 0 | Status: DISCONTINUED | OUTPATIENT
Start: 2023-06-08 | End: 2023-06-09

## 2023-06-08 RX ORDER — METOPROLOL TARTRATE 50 MG
100 TABLET ORAL EVERY 12 HOURS
Refills: 0 | Status: DISCONTINUED | OUTPATIENT
Start: 2023-06-08 | End: 2023-06-08

## 2023-06-08 RX ORDER — SODIUM CHLORIDE 9 MG/ML
250 INJECTION INTRAMUSCULAR; INTRAVENOUS; SUBCUTANEOUS ONCE
Refills: 0 | Status: COMPLETED | OUTPATIENT
Start: 2023-06-08 | End: 2023-06-08

## 2023-06-08 RX ORDER — MUPIROCIN 20 MG/G
1 OINTMENT TOPICAL EVERY 12 HOURS
Refills: 0 | Status: DISCONTINUED | OUTPATIENT
Start: 2023-06-08 | End: 2023-06-09

## 2023-06-08 RX ORDER — CHLORHEXIDINE GLUCONATE 213 G/1000ML
1 SOLUTION TOPICAL
Refills: 0 | Status: DISCONTINUED | OUTPATIENT
Start: 2023-06-08 | End: 2023-06-09

## 2023-06-08 RX ORDER — VANCOMYCIN HCL 1 G
1500 VIAL (EA) INTRAVENOUS ONCE
Refills: 0 | Status: COMPLETED | OUTPATIENT
Start: 2023-06-09 | End: 2023-06-09

## 2023-06-08 RX ORDER — PANTOPRAZOLE SODIUM 20 MG/1
40 TABLET, DELAYED RELEASE ORAL
Refills: 0 | Status: DISCONTINUED | OUTPATIENT
Start: 2023-06-08 | End: 2023-06-09

## 2023-06-08 RX ORDER — CHLORHEXIDINE GLUCONATE 213 G/1000ML
15 SOLUTION TOPICAL
Refills: 0 | Status: DISCONTINUED | OUTPATIENT
Start: 2023-06-08 | End: 2023-06-09

## 2023-06-08 RX ORDER — METOPROLOL TARTRATE 50 MG
100 TABLET ORAL EVERY 12 HOURS
Refills: 0 | Status: DISCONTINUED | OUTPATIENT
Start: 2023-06-08 | End: 2023-06-09

## 2023-06-08 RX ORDER — SODIUM CHLORIDE 9 MG/ML
3 INJECTION INTRAMUSCULAR; INTRAVENOUS; SUBCUTANEOUS EVERY 8 HOURS
Refills: 0 | Status: DISCONTINUED | OUTPATIENT
Start: 2023-06-08 | End: 2023-06-09

## 2023-06-08 RX ORDER — CEFUROXIME AXETIL 250 MG
1500 TABLET ORAL ONCE
Refills: 0 | Status: COMPLETED | OUTPATIENT
Start: 2023-06-09 | End: 2023-06-09

## 2023-06-08 RX ORDER — DILTIAZEM HCL 120 MG
60 CAPSULE, EXT RELEASE 24 HR ORAL EVERY 6 HOURS
Refills: 0 | Status: DISCONTINUED | OUTPATIENT
Start: 2023-06-08 | End: 2023-06-09

## 2023-06-08 RX ORDER — ASPIRIN/CALCIUM CARB/MAGNESIUM 324 MG
81 TABLET ORAL DAILY
Refills: 0 | Status: DISCONTINUED | OUTPATIENT
Start: 2023-06-08 | End: 2023-06-09

## 2023-06-08 RX ADMIN — SODIUM CHLORIDE 500 MILLILITER(S): 9 INJECTION INTRAMUSCULAR; INTRAVENOUS; SUBCUTANEOUS at 09:27

## 2023-06-08 RX ADMIN — Medication 100 MILLIGRAM(S): at 17:25

## 2023-06-08 RX ADMIN — CHLORHEXIDINE GLUCONATE 15 MILLILITER(S): 213 SOLUTION TOPICAL at 17:24

## 2023-06-08 RX ADMIN — CHLORHEXIDINE GLUCONATE 1 APPLICATION(S): 213 SOLUTION TOPICAL at 21:32

## 2023-06-08 RX ADMIN — ATORVASTATIN CALCIUM 80 MILLIGRAM(S): 80 TABLET, FILM COATED ORAL at 23:03

## 2023-06-08 RX ADMIN — Medication 60 MILLIGRAM(S): at 23:03

## 2023-06-08 RX ADMIN — Medication 81 MILLIGRAM(S): at 09:09

## 2023-06-08 RX ADMIN — SODIUM CHLORIDE 3 MILLILITER(S): 9 INJECTION INTRAMUSCULAR; INTRAVENOUS; SUBCUTANEOUS at 22:50

## 2023-06-08 RX ADMIN — Medication 60 MILLIGRAM(S): at 17:25

## 2023-06-08 NOTE — H&P PST ADULT - NSICDXPASTMEDICALHX_GEN_ALL_CORE_FT
PAST MEDICAL HISTORY:  Afib     History of right hip replacement     HTN (hypertension)     Obesity, morbid     Osteoarthritis

## 2023-06-08 NOTE — CONSULT NOTE ADULT - PROBLEM SELECTOR RECOMMENDATION 3
Pt on TOPROL XL 200mg QD and Diltiazem 240mg QD at home  Ordered Metoprolol 100mg BID in pre op setting  Will resume home dose Diltiazem.   Will continue to monitor patients blood pressure and adjust medications as needed.

## 2023-06-08 NOTE — H&P PST ADULT - HISTORY OF PRESENT ILLNESS
Narrative: This is a 56 yo male who had presented to the ED yesterday for c/o Chest discomfort described as a burning sensation mid sternal, dyspnea, palpitations and lightheadedness.  He has a PMHx of OA and within the pas year has had a R hip replacement with multiple revisions and persistent infection. He had been on home IV abx via a PICC line for 4-5 weeks.  He has also been diagnosed with Afib and HTN within the past year.      Symptoms:        Angina (Class):        Ischemic Symptoms:     Heart Failure:        Systolic/Diastolic/Combined:        NYHA Class (within 2 weeks):     Assessment of LVEF:       EF:        Assessed by:        Date:     Prior Cardiac Interventions:       PCI's:        CABG:     Noninvasive Testing:   Stress Test: Date:        Protocol:        Duration of Exercise:        Symptoms:        EKG Changes:        DTS:        Myocardial Imaging:        Risk Assessment:     Echo:     Antianginal Therapies:        Beta Blockers:         Calcium Channel Blockers:        Long Acting Nitrates:        Ranexa:     Associated Risk Factors:        Cerebrovascular Disease: N/A       Chronic Lung Disease: N/A       Peripheral Arterial Disease: N/A       Chronic Kidney Disease (if yes, what is GFR): N/A       Uncontrolled Diabetes (if yes, what is HgbA1C or FBS): N/A       Poorly Controlled Hypertension (if yes, what is SBP): N/A       Morbid Obesity (if yes, what is BMI): N/A       History of Recent Ventricular Arrhythmia: N/A       Inability to Ambulate Safely: N/A       Need for Therapeutic Anticoagulation: N/A       Antiplatelet or Contrast Allergy: N/A Narrative: This is a 58 yo male who had presented to the ED yesterday for c/o Chest discomfort described as a burning sensation mid sternal, dyspnea, palpitations and lightheadedness.  He has a PMHx of OA and within the past year has had a R hip replacement with 6 revisions secondary to persistent infection. He had been on home IV abx via a PICC line for 4 weeks and again for 6 weeks.  He has also been diagnosed with Afib and HTN within the past year.      Symptoms:        Angina (Class): 3       Ischemic Symptoms: Chest pain/burning, dyspnea    Heart Failure:        Systolic/Diastolic/Combined:        NYHA Class (within 2 weeks):     Assessment of LVEF:       EF: 55       Assessed by: TTE       Date: 10/30/22    Prior Cardiac Interventions: None    Noninvasive Testing:   Stress Test: Date: 6/16/22       Protocol: Regadenoson Sestamibi Stress       Duration of Exercise: NA       Symptoms: Nausea       EKG Changes: No significant ST changes       DTS: NA       Myocardial Imaging: Small fixed defect mid inferior wall. No prone imaging. Normal wall motion.  No evidence of reversible ischemia.          Risk Assessment: Low    < from: CT Angio Chest PE Protocol w/ IV Cont (06.07.23 @ 09:31) >  IMPRESSION: No pulmonary embolus is noted.    --- End of Report ---  HIREN BARLOW MD; Attending Radiologist    < end of copied text >    < from: US Duplex Venous Lower Ext Complete, Bilateral (06.07.23 @ 09:42) >  FINDINGS:    RIGHT:  Normal compressibility of the RIGHT common femoral, femoral and popliteal   veins.  Doppler examination shows normal spontaneous and phasic flow.  No RIGHT calf vein thrombosis is detected.    LEFT:  Normal compressibility of the LEFT common femoral, femoral and popliteal   veins.  Doppler examination shows normal spontaneous and phasic flow.  No LEFT calf vein thrombosis is detected.    IMPRESSION:  No evidence of deep venous thrombosis in either lower extremity.            --- End of Report ---  JERRI MOORE MD; Attending Radiologist    < end of copied text >    Echo: 10/30/22  LV EF 55%  Normal LV systolic function  Mild LVH  Mild dilated LA  Dilated RV  mild mitral regurgitation  Mild tricuspid regurgitation  Mildly dilated aortic root    Antianginal Therapies:        Beta Blockers:  Metoprolol Succinate 200 mg daily       Calcium Channel Blockers: Ditiazem HCL  mg daily        Long Acting Nitrates:        Ranexa:     Associated Risk Factors:        Cerebrovascular Disease: N/A       Chronic Lung Disease: N/A       Peripheral Arterial Disease: N/A       Chronic Kidney Disease (if yes, what is GFR): N/A       Uncontrolled Diabetes (if yes, what is HgbA1C or FBS): N/A       Poorly Controlled Hypertension (if yes, what is SBP): Controlled       Morbid Obesity (if yes, what is BMI): 34.3 yes       History of Recent Ventricular Arrhythmia: N/A       Inability to Ambulate Safely: N/A       Need for Therapeutic Anticoagulation: N/A       Antiplatelet or Contrast Allergy: N/A

## 2023-06-08 NOTE — CONSULT NOTE ADULT - PROBLEM SELECTOR RECOMMENDATION 9
Pt with MVD on recent [facility] 6/8/2023, plan for OR tomorrow 6/9/23 with Dr. Duke for CABG procedure  Preop work up ordered including carotid US to assess for carotid stenosis, PFTs to eval lung function, and lab work including TSH, prealbumin, Hgb A1C, P2Y12, BNP, T&S, MRSA/MSSA, and UA.   TTE recently performed outpatient showing normal EF, no valvular dysfunction, report can be found in HIE documents. No need to repeat per Dr. Duke.   NPO after midnight  Pre op orders in place.   Vein mapped at the bedside, pt with adequate conduit for CABG procedure.   Plan discussed with Dr. Duke.

## 2023-06-08 NOTE — CONSULT NOTE ADULT - ASSESSMENT
57yMale daily Marijuana smoker with PMHx Afib, HTN, osteoarthritis, R hip replacement which subsequently required 6 revisions received IV ABX via PICC line completed approx 3/2023), presented to [facility] ED on 6/7/23 with unstable angina. Pt had a LHC on 6/8/23 with Dr. Sanchez which revealed MVD. Admitted on 6/8/2023 for pre-op CABG with Dr. Duke, with plan for OR on 6/9/23.

## 2023-06-08 NOTE — CONSULT NOTE ADULT - PROBLEM SELECTOR RECOMMENDATION 2
Plan for BHARTI clip with [facility] pulmonary vein isolation in OR tomorrow 6/9/23.  History of new Afib in the last year, on ASA and Cardizem, no other AC
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
## 2023-06-08 NOTE — H&P PST ADULT - ASSESSMENT
This is a 56 yo male with c/o CP and dyspnea on exertion for a [facility].    ASA 2  Mallampati 2  GFR 89  Creat 1.0  Bleeding Risk 1.3%     Plan: PRE-PROCEDURE ASSESSMENT    -Patient seen and examined  PRE-PROCEDURE ASSESSMENT  -NPO after midnight confirmed  -IV insert  -Patient seen and examined  -Labs reviewed  -Pre-procedure teaching completed with patient   -consent obtained   -Questions answered   Plan: PRE-PROCEDURE ASSESSMENT

## 2023-06-08 NOTE — CONSULT NOTE ADULT - SUBJECTIVE AND OBJECTIVE BOX
SUBJECTIVE    HPI  57yMale daily Marijuana smoker with PMHx Afib, HTN, osteoarthritis, presented to [facility] ED on 6/7/23  c/o mid sternal exertional chest pain described as a burning sensation with associated dyspnea, palpitations, and lightheadedness. Pt reports he had a R hip replacement which subsequently required 6 revisions, the most recent being 8 weeks ago. He has been recovering and rehabilitating from surgery, beginning to walk longer distances, with exertion he has been experiencing chest pain and dyspnea. Pt had a LHC on 6/8/23 with Dr. Sanchez which revealed MVD. Pt was admitted on 6/8/2023 for pre-op CABG with Dr. Duke, with plan for OR on 6/9/23. He denies fevers headache, edema, nausea, or vomiting.    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 due to multiple R hip surgeries     PAST MEDICAL / SURGICAL HISTORY   PAST MEDICAL & SURGICAL HISTORY:  HTN (hypertension)      Afib      Osteoarthritis      History of right hip replacement      Obesity, morbid      S/P hip replacement, right        HOME MEDICATIONS  Home Medications:  aspirin 81 mg oral tablet: 1 orally once a day (08 Jun 2023 08:13)  DilTIAZem (Eqv-Cardizem CD) 240 mg/24 hours oral capsule, extended release: 1 orally once a day (08 Jun 2023 08:13)  Toprol- mg oral tablet, extended release: 1 orally once a day (08 Jun 2023 08:13)      ALLERGIES  Allergies    No Known Allergies    Intolerances      OBJECTIVE DATA    VITALS   currently in sinus rhythm  ICU Vital Signs Last 24 Hrs  T(C): 36.8 (08 Jun 2023 08:04), Max: 36.8 (08 Jun 2023 08:04)  T(F): 98.3 (08 Jun 2023 08:04), Max: 98.3 (08 Jun 2023 08:04)  HR: 64 (08 Jun 2023 12:15) (63 - 75)  BP: 160/100 (08 Jun 2023 12:30) (136/93 - 160/100)  BP(mean): 107 (08 Jun 2023 07:33) (107 - 107)  ABP: --  ABP(mean): --  RR: 16 (08 Jun 2023 12:30) (15 - 26)  SpO2: 98% (08 Jun 2023 12:30) (97% - 99%)    O2 Parameters below as of 08 Jun 2023 12:00  Patient On (Oxygen Delivery Method): room air      General: Well appearing, NAD  Neuro: AxO x3, non-focal   HEENT: normocephalic, EOMI  Neck: Supple, no JVD, no tenderness  Cardiac: S1S2, no murmurs  Pulm: CTA b/l, no wheezing or rales  Abdomen: Soft, NT, ND, + BS  Peripheral: +DP pulses b/l, no peripheral edema      LABS                         10.6   2.85  )-----------( 191      ( 08 Jun 2023 14:39 )             33.6   PT/INR - ( 08 Jun 2023 14:39 )   PT: 12.8 sec;   INR: 1.10 ratio         PTT - ( 08 Jun 2023 14:39 )  PTT:29.6 dwa05-55    136  |  103  |  19.7  ----------------------------<  136<H>  4.2   |  23.0  |  1.00    Ca    9.3      07 Jun 2023 07:57    TPro  7.3  /  Alb  4.0  /  TBili  <0.2<L>  /  DBili  x   /  AST  20  /  ALT  16  /  AlkPhos  150<H>  06-07  CARDIAC MARKERS ( 08 Jun 2023 07:40 )  x     / <0.01 ng/mL / x     / x     / x        IMAGING   personally reviewed imaging       CATH: pending official cath report  prelim report   ostial LAD 80-90%   mid LAD 80%   prox L circ 60%   OM1 small diffuse disease   OM2 80%   Diag 80%       TTE performed at [facility] on 6/3/2023 by Dr. Ben Sanchez     CONCLUSIONS:  1.  The left ventricular systolic function is normal with an ejection fraction of 52 % by Hampton's method of   disks with an ejection fraction visually estimated at 55 to 60 %.  2.  Hypermobile interatrial septum.  3.  Mild calcification of the aortic valve leaflets.  4.  Mild left ventricular hypertrophy.  5.  The right atrium is moderately dilated.  6.  Moderately enlarged right ventricular cavity size.  7.  No pericardial effusion seen.  8.  Unable to assess left ventricular diastolic function due to insufficient data.  9.  Compared to the transthoracic echocardiogram performed on 10/30/2022 aortic root measures 4.3cm   from 4.0cm.

## 2023-06-09 ENCOUNTER — APPOINTMENT (OUTPATIENT)
Dept: CARDIOTHORACIC SURGERY | Facility: HOSPITAL | Age: 58
End: 2023-06-09

## 2023-06-09 ENCOUNTER — RESULT REVIEW (OUTPATIENT)
Age: 58
End: 2023-06-09

## 2023-06-09 PROBLEM — E66.01 MORBID (SEVERE) OBESITY DUE TO EXCESS CALORIES: Chronic | Status: ACTIVE | Noted: 2023-06-08

## 2023-06-09 PROBLEM — M19.90 UNSPECIFIED OSTEOARTHRITIS, UNSPECIFIED SITE: Chronic | Status: ACTIVE | Noted: 2023-06-08

## 2023-06-09 LAB
ALBUMIN SERPL ELPH-MCNC: 3.3 G/DL — SIGNIFICANT CHANGE UP (ref 3.3–5.2)
ALP SERPL-CCNC: 117 U/L — SIGNIFICANT CHANGE UP (ref 40–120)
ALT FLD-CCNC: 21 U/L — SIGNIFICANT CHANGE UP
ANION GAP SERPL CALC-SCNC: 13 MMOL/L — SIGNIFICANT CHANGE UP (ref 5–17)
ANION GAP SERPL CALC-SCNC: 16 MMOL/L — SIGNIFICANT CHANGE UP (ref 5–17)
APTT BLD: 29.9 SEC — SIGNIFICANT CHANGE UP (ref 27.5–35.5)
AST SERPL-CCNC: 47 U/L — HIGH
BASE EXCESS BLDA CALC-SCNC: -0.8 MMOL/L — SIGNIFICANT CHANGE UP (ref -2–3)
BASE EXCESS BLDA CALC-SCNC: -1.9 MMOL/L — SIGNIFICANT CHANGE UP (ref -2–3)
BASE EXCESS BLDA CALC-SCNC: -1.9 MMOL/L — SIGNIFICANT CHANGE UP (ref -2–3)
BASE EXCESS BLDA CALC-SCNC: -2.6 MMOL/L — LOW (ref -2–3)
BASE EXCESS BLDA CALC-SCNC: -3.9 MMOL/L — LOW (ref -2–3)
BASE EXCESS BLDA CALC-SCNC: -4.6 MMOL/L — LOW (ref -2–3)
BASE EXCESS BLDA CALC-SCNC: 3 MMOL/L — SIGNIFICANT CHANGE UP (ref -2–3)
BASE EXCESS BLDV CALC-SCNC: -1.1 MMOL/L — SIGNIFICANT CHANGE UP (ref -2–3)
BASE EXCESS BLDV CALC-SCNC: -1.3 MMOL/L — SIGNIFICANT CHANGE UP (ref -2–3)
BASE EXCESS BLDV CALC-SCNC: -3.3 MMOL/L — LOW (ref -2–3)
BASE EXCESS BLDV CALC-SCNC: -4.5 MMOL/L — LOW (ref -2–3)
BASE EXCESS BLDV CALC-SCNC: -7 MMOL/L — LOW (ref -2–3)
BASOPHILS # BLD AUTO: 0.05 K/UL — SIGNIFICANT CHANGE UP (ref 0–0.2)
BASOPHILS NFR BLD AUTO: 0.3 % — SIGNIFICANT CHANGE UP (ref 0–2)
BILIRUB SERPL-MCNC: 0.7 MG/DL — SIGNIFICANT CHANGE UP (ref 0.4–2)
BLOOD GAS COMMENTS, VENOUS: SIGNIFICANT CHANGE UP
BUN SERPL-MCNC: 16.6 MG/DL — SIGNIFICANT CHANGE UP (ref 8–20)
BUN SERPL-MCNC: 17.3 MG/DL — SIGNIFICANT CHANGE UP (ref 8–20)
CA-I BLDA-SCNC: 1.06 MMOL/L — LOW (ref 1.15–1.33)
CA-I BLDA-SCNC: 1.11 MMOL/L — LOW (ref 1.15–1.33)
CA-I BLDA-SCNC: 1.11 MMOL/L — LOW (ref 1.15–1.33)
CA-I BLDA-SCNC: 1.19 MMOL/L — SIGNIFICANT CHANGE UP (ref 1.15–1.33)
CA-I BLDA-SCNC: 1.25 MMOL/L — SIGNIFICANT CHANGE UP (ref 1.15–1.33)
CA-I BLDA-SCNC: 1.29 MMOL/L — SIGNIFICANT CHANGE UP (ref 1.15–1.33)
CA-I BLDA-SCNC: 1.33 MMOL/L — SIGNIFICANT CHANGE UP (ref 1.15–1.33)
CA-I SERPL-SCNC: 1.07 MMOL/L — LOW (ref 1.15–1.33)
CA-I SERPL-SCNC: 1.11 MMOL/L — LOW (ref 1.15–1.33)
CA-I SERPL-SCNC: 1.14 MMOL/L — LOW (ref 1.15–1.33)
CA-I SERPL-SCNC: 1.23 MMOL/L — SIGNIFICANT CHANGE UP (ref 1.15–1.33)
CA-I SERPL-SCNC: 1.24 MMOL/L — SIGNIFICANT CHANGE UP (ref 1.15–1.33)
CALCIUM SERPL-MCNC: 8.6 MG/DL — SIGNIFICANT CHANGE UP (ref 8.4–10.5)
CALCIUM SERPL-MCNC: 8.8 MG/DL — SIGNIFICANT CHANGE UP (ref 8.4–10.5)
CHLORIDE BLDA-SCNC: 106 MMOL/L — SIGNIFICANT CHANGE UP (ref 96–108)
CHLORIDE BLDA-SCNC: 107 MMOL/L — SIGNIFICANT CHANGE UP (ref 96–108)
CHLORIDE BLDA-SCNC: 108 MMOL/L — SIGNIFICANT CHANGE UP (ref 96–108)
CHLORIDE BLDA-SCNC: 108 MMOL/L — SIGNIFICANT CHANGE UP (ref 96–108)
CHLORIDE BLDA-SCNC: 109 MMOL/L — HIGH (ref 96–108)
CHLORIDE BLDA-SCNC: 109 MMOL/L — HIGH (ref 96–108)
CHLORIDE BLDA-SCNC: 110 MMOL/L — HIGH (ref 96–108)
CHLORIDE BLDV-SCNC: 106 MMOL/L — SIGNIFICANT CHANGE UP (ref 96–108)
CHLORIDE BLDV-SCNC: 107 MMOL/L — SIGNIFICANT CHANGE UP (ref 96–108)
CHLORIDE BLDV-SCNC: 107 MMOL/L — SIGNIFICANT CHANGE UP (ref 96–108)
CHLORIDE BLDV-SCNC: 109 MMOL/L — HIGH (ref 96–108)
CHLORIDE BLDV-SCNC: 110 MMOL/L — HIGH (ref 96–108)
CHLORIDE SERPL-SCNC: 102 MMOL/L — SIGNIFICANT CHANGE UP (ref 96–108)
CHLORIDE SERPL-SCNC: 107 MMOL/L — SIGNIFICANT CHANGE UP (ref 96–108)
CK MB CFR SERPL CALC: 20.9 NG/ML — HIGH (ref 0–6.7)
CK SERPL-CCNC: 273 U/L — HIGH (ref 30–200)
CO2 SERPL-SCNC: 17 MMOL/L — LOW (ref 22–29)
CO2 SERPL-SCNC: 22 MMOL/L — SIGNIFICANT CHANGE UP (ref 22–29)
COHGB MFR BLDA: 0.6 % — SIGNIFICANT CHANGE UP
COHGB MFR BLDA: 0.7 % — SIGNIFICANT CHANGE UP
COHGB MFR BLDA: 1 % — SIGNIFICANT CHANGE UP
COHGB MFR BLDA: 1.3 % — SIGNIFICANT CHANGE UP
COHGB MFR BLDA: 1.4 % — SIGNIFICANT CHANGE UP
COHGB MFR BLDA: 1.4 % — SIGNIFICANT CHANGE UP
COHGB MFR BLDA: 1.5 % — SIGNIFICANT CHANGE UP
COHGB MFR BLDV: 1.5 % — SIGNIFICANT CHANGE UP
COHGB MFR BLDV: 1.5 % — SIGNIFICANT CHANGE UP
COHGB MFR BLDV: 1.9 % — SIGNIFICANT CHANGE UP
CREAT SERPL-MCNC: 0.85 MG/DL — SIGNIFICANT CHANGE UP (ref 0.5–1.3)
CREAT SERPL-MCNC: 0.9 MG/DL — SIGNIFICANT CHANGE UP (ref 0.5–1.3)
EGFR: 101 ML/MIN/1.73M2 — SIGNIFICANT CHANGE UP
EGFR: 103 ML/MIN/1.73M2 — SIGNIFICANT CHANGE UP
EOSINOPHIL # BLD AUTO: 0.2 K/UL — SIGNIFICANT CHANGE UP (ref 0–0.5)
EOSINOPHIL NFR BLD AUTO: 1.3 % — SIGNIFICANT CHANGE UP (ref 0–6)
GAS PNL BLDA: SIGNIFICANT CHANGE UP
GAS PNL BLDA: SIGNIFICANT CHANGE UP
GAS PNL BLDV: 135 MMOL/L — LOW (ref 136–145)
GAS PNL BLDV: 136 MMOL/L — SIGNIFICANT CHANGE UP (ref 136–145)
GAS PNL BLDV: 136 MMOL/L — SIGNIFICANT CHANGE UP (ref 136–145)
GAS PNL BLDV: 139 MMOL/L — SIGNIFICANT CHANGE UP (ref 136–145)
GAS PNL BLDV: 139 MMOL/L — SIGNIFICANT CHANGE UP (ref 136–145)
GAS PNL BLDV: SIGNIFICANT CHANGE UP
GLUCOSE BLDA-MCNC: 107 MG/DL — HIGH (ref 70–99)
GLUCOSE BLDA-MCNC: 133 MG/DL — HIGH (ref 70–99)
GLUCOSE BLDA-MCNC: 137 MG/DL — HIGH (ref 70–99)
GLUCOSE BLDA-MCNC: 140 MG/DL — HIGH (ref 70–99)
GLUCOSE BLDA-MCNC: 143 MG/DL — HIGH (ref 70–99)
GLUCOSE BLDA-MCNC: 150 MG/DL — HIGH (ref 70–99)
GLUCOSE BLDA-MCNC: 156 MG/DL — HIGH (ref 70–99)
GLUCOSE BLDC GLUCOMTR-MCNC: 157 MG/DL — HIGH (ref 70–99)
GLUCOSE BLDC GLUCOMTR-MCNC: 178 MG/DL — HIGH (ref 70–99)
GLUCOSE BLDC GLUCOMTR-MCNC: 233 MG/DL — HIGH (ref 70–99)
GLUCOSE BLDC GLUCOMTR-MCNC: 233 MG/DL — HIGH (ref 70–99)
GLUCOSE BLDC GLUCOMTR-MCNC: 236 MG/DL — HIGH (ref 70–99)
GLUCOSE BLDC GLUCOMTR-MCNC: 239 MG/DL — HIGH (ref 70–99)
GLUCOSE BLDC GLUCOMTR-MCNC: 240 MG/DL — HIGH (ref 70–99)
GLUCOSE BLDC GLUCOMTR-MCNC: 242 MG/DL — HIGH (ref 70–99)
GLUCOSE BLDV-MCNC: 134 MG/DL — HIGH (ref 70–99)
GLUCOSE BLDV-MCNC: 139 MG/DL — HIGH (ref 70–99)
GLUCOSE BLDV-MCNC: 152 MG/DL — HIGH (ref 70–99)
GLUCOSE BLDV-MCNC: 182 MG/DL — HIGH (ref 70–99)
GLUCOSE BLDV-MCNC: 207 MG/DL — HIGH (ref 70–99)
GLUCOSE SERPL-MCNC: 184 MG/DL — HIGH (ref 70–99)
GLUCOSE SERPL-MCNC: 98 MG/DL — SIGNIFICANT CHANGE UP (ref 70–99)
HCO3 BLDA-SCNC: 21 MMOL/L — SIGNIFICANT CHANGE UP (ref 21–28)
HCO3 BLDA-SCNC: 22 MMOL/L — SIGNIFICANT CHANGE UP (ref 21–28)
HCO3 BLDA-SCNC: 23 MMOL/L — SIGNIFICANT CHANGE UP (ref 21–28)
HCO3 BLDA-SCNC: 24 MMOL/L — SIGNIFICANT CHANGE UP (ref 21–28)
HCO3 BLDA-SCNC: 24 MMOL/L — SIGNIFICANT CHANGE UP (ref 21–28)
HCO3 BLDA-SCNC: 25 MMOL/L — SIGNIFICANT CHANGE UP (ref 21–28)
HCO3 BLDA-SCNC: 27 MMOL/L — SIGNIFICANT CHANGE UP (ref 21–28)
HCO3 BLDV-SCNC: 19 MMOL/L — LOW (ref 22–29)
HCO3 BLDV-SCNC: 22 MMOL/L — SIGNIFICANT CHANGE UP (ref 22–29)
HCO3 BLDV-SCNC: 23 MMOL/L — SIGNIFICANT CHANGE UP (ref 22–29)
HCO3 BLDV-SCNC: 25 MMOL/L — SIGNIFICANT CHANGE UP (ref 22–29)
HCO3 BLDV-SCNC: 25 MMOL/L — SIGNIFICANT CHANGE UP (ref 22–29)
HCT VFR BLD CALC: 32 % — LOW (ref 39–50)
HCT VFR BLD CALC: 35.2 % — LOW (ref 39–50)
HCT VFR BLDA CALC: 25 % — SIGNIFICANT CHANGE UP
HCT VFR BLDA CALC: 26 % — SIGNIFICANT CHANGE UP
HCT VFR BLDA CALC: 27 % — SIGNIFICANT CHANGE UP
HCT VFR BLDA CALC: 27 % — SIGNIFICANT CHANGE UP
HCT VFR BLDA CALC: 28 % — SIGNIFICANT CHANGE UP
HCT VFR BLDA CALC: 28 % — SIGNIFICANT CHANGE UP
HCT VFR BLDA CALC: 29 % — SIGNIFICANT CHANGE UP
HCT VFR BLDA CALC: 31 % — SIGNIFICANT CHANGE UP
HCT VFR BLDA CALC: 32 % — SIGNIFICANT CHANGE UP
HCT VFR BLDA CALC: 33 % — SIGNIFICANT CHANGE UP
HGB BLD CALC-MCNC: 10.5 G/DL — LOW (ref 12.6–17.4)
HGB BLD CALC-MCNC: 8.4 G/DL — LOW (ref 12.6–17.4)
HGB BLD CALC-MCNC: 8.6 G/DL — LOW (ref 12.6–17.4)
HGB BLD CALC-MCNC: 9 G/DL — LOW (ref 12.6–17.4)
HGB BLD CALC-MCNC: 9.5 G/DL — LOW (ref 12.6–17.4)
HGB BLD-MCNC: 10.1 G/DL — LOW (ref 13–17)
HGB BLD-MCNC: 10.7 G/DL — LOW (ref 13–17)
HGB BLDA-MCNC: 10.3 G/DL — LOW (ref 12.6–17.4)
HGB BLDA-MCNC: 10.9 G/DL — LOW (ref 12.6–17.4)
HGB BLDA-MCNC: 8.5 G/DL — LOW (ref 12.6–17.4)
HGB BLDA-MCNC: 8.7 G/DL — LOW (ref 12.6–17.4)
HGB BLDA-MCNC: 9.1 G/DL — LOW (ref 12.6–17.4)
HGB BLDA-MCNC: 9.2 G/DL — LOW (ref 12.6–17.4)
HGB BLDA-MCNC: 9.3 G/DL — LOW (ref 12.6–17.4)
IMM GRANULOCYTES NFR BLD AUTO: 0.9 % — SIGNIFICANT CHANGE UP (ref 0–0.9)
INR BLD: 1.26 RATIO — HIGH (ref 0.88–1.16)
LACTATE BLDA-MCNC: 0.7 MMOL/L — SIGNIFICANT CHANGE UP (ref 0.5–2)
LACTATE BLDA-MCNC: 0.8 MMOL/L — SIGNIFICANT CHANGE UP (ref 0.5–2)
LACTATE BLDA-MCNC: 1 MMOL/L — SIGNIFICANT CHANGE UP (ref 0.5–2)
LACTATE BLDA-MCNC: 1.4 MMOL/L — SIGNIFICANT CHANGE UP (ref 0.5–2)
LACTATE BLDV-MCNC: 0.8 MMOL/L — SIGNIFICANT CHANGE UP (ref 0.5–2)
LACTATE BLDV-MCNC: 2.5 MMOL/L — HIGH (ref 0.5–2)
LACTATE BLDV-MCNC: 4.8 MMOL/L — CRITICAL HIGH (ref 0.5–2)
LYMPHOCYTES # BLD AUTO: 21.1 % — SIGNIFICANT CHANGE UP (ref 13–44)
LYMPHOCYTES # BLD AUTO: 3.32 K/UL — HIGH (ref 1–3.3)
MAGNESIUM SERPL-MCNC: 2.2 MG/DL — SIGNIFICANT CHANGE UP (ref 1.6–2.6)
MAGNESIUM SERPL-MCNC: 2.5 MG/DL — SIGNIFICANT CHANGE UP (ref 1.8–2.6)
MCHC RBC-ENTMCNC: 23.7 PG — LOW (ref 27–34)
MCHC RBC-ENTMCNC: 24.6 PG — LOW (ref 27–34)
MCHC RBC-ENTMCNC: 30.4 GM/DL — LOW (ref 32–36)
MCHC RBC-ENTMCNC: 31.6 GM/DL — LOW (ref 32–36)
MCV RBC AUTO: 77.9 FL — LOW (ref 80–100)
MCV RBC AUTO: 77.9 FL — LOW (ref 80–100)
METHGB MFR BLDA: 0.3 % — SIGNIFICANT CHANGE UP
METHGB MFR BLDA: 0.3 % — SIGNIFICANT CHANGE UP
METHGB MFR BLDA: 0.5 % — SIGNIFICANT CHANGE UP
METHGB MFR BLDA: 0.5 % — SIGNIFICANT CHANGE UP
METHGB MFR BLDA: 0.6 % — SIGNIFICANT CHANGE UP
METHGB MFR BLDA: 0.7 % — SIGNIFICANT CHANGE UP
METHGB MFR BLDA: 0.7 % — SIGNIFICANT CHANGE UP
METHGB MFR BLDV: 0 % — SIGNIFICANT CHANGE UP
METHGB MFR BLDV: 0.2 % — SIGNIFICANT CHANGE UP
METHGB MFR BLDV: 0.4 % — SIGNIFICANT CHANGE UP
MONOCYTES # BLD AUTO: 0.87 K/UL — SIGNIFICANT CHANGE UP (ref 0–0.9)
MONOCYTES NFR BLD AUTO: 5.5 % — SIGNIFICANT CHANGE UP (ref 2–14)
MRSA PCR RESULT.: SIGNIFICANT CHANGE UP
NEUTROPHILS # BLD AUTO: 11.17 K/UL — HIGH (ref 1.8–7.4)
NEUTROPHILS NFR BLD AUTO: 70.9 % — SIGNIFICANT CHANGE UP (ref 43–77)
OXYHGB MFR BLDA: 96 % — HIGH (ref 90–95)
OXYHGB MFR BLDA: 98 % — HIGH (ref 90–95)
PCO2 BLDA: 37 MMHG — SIGNIFICANT CHANGE UP (ref 35–48)
PCO2 BLDA: 38 MMHG — SIGNIFICANT CHANGE UP (ref 35–48)
PCO2 BLDA: 40 MMHG — SIGNIFICANT CHANGE UP (ref 35–48)
PCO2 BLDA: 43 MMHG — SIGNIFICANT CHANGE UP (ref 35–48)
PCO2 BLDA: 45 MMHG — SIGNIFICANT CHANGE UP (ref 35–48)
PCO2 BLDA: 47 MMHG — SIGNIFICANT CHANGE UP (ref 35–48)
PCO2 BLDA: 53 MMHG — HIGH (ref 35–48)
PCO2 BLDV: 37 MMHG — LOW (ref 42–55)
PCO2 BLDV: 46 MMHG — SIGNIFICANT CHANGE UP (ref 42–55)
PCO2 BLDV: 47 MMHG — SIGNIFICANT CHANGE UP (ref 42–55)
PCO2 BLDV: 47 MMHG — SIGNIFICANT CHANGE UP (ref 42–55)
PCO2 BLDV: 51 MMHG — SIGNIFICANT CHANGE UP (ref 42–55)
PH BLDA: 7.27 — LOW (ref 7.35–7.45)
PH BLDA: 7.32 — LOW (ref 7.35–7.45)
PH BLDA: 7.32 — LOW (ref 7.35–7.45)
PH BLDA: 7.35 — SIGNIFICANT CHANGE UP (ref 7.35–7.45)
PH BLDA: 7.35 — SIGNIFICANT CHANGE UP (ref 7.35–7.45)
PH BLDA: 7.4 — SIGNIFICANT CHANGE UP (ref 7.35–7.45)
PH BLDA: 7.44 — SIGNIFICANT CHANGE UP (ref 7.35–7.45)
PH BLDV: 7.29 — LOW (ref 7.32–7.43)
PH BLDV: 7.3 — LOW (ref 7.32–7.43)
PH BLDV: 7.3 — LOW (ref 7.32–7.43)
PH BLDV: 7.32 — SIGNIFICANT CHANGE UP (ref 7.32–7.43)
PH BLDV: 7.33 — SIGNIFICANT CHANGE UP (ref 7.32–7.43)
PLATELET # BLD AUTO: 207 K/UL — SIGNIFICANT CHANGE UP (ref 150–400)
PLATELET # BLD AUTO: 245 K/UL — SIGNIFICANT CHANGE UP (ref 150–400)
PO2 BLDA: 148 MMHG — HIGH (ref 83–108)
PO2 BLDA: 317 MMHG — HIGH (ref 83–108)
PO2 BLDA: 416 MMHG — HIGH (ref 83–108)
PO2 BLDA: 436 MMHG — HIGH (ref 83–108)
PO2 BLDA: 473 MMHG — HIGH (ref 83–108)
PO2 BLDA: 474 MMHG — HIGH (ref 83–108)
PO2 BLDA: 92 MMHG — SIGNIFICANT CHANGE UP (ref 83–108)
PO2 BLDV: 46 MMHG — HIGH (ref 25–45)
PO2 BLDV: 56 MMHG — HIGH (ref 25–45)
PO2 BLDV: 59 MMHG — HIGH (ref 25–45)
PO2 BLDV: 61 MMHG — HIGH (ref 25–45)
PO2 BLDV: <42 MMHG — SIGNIFICANT CHANGE UP (ref 25–45)
POTASSIUM BLDA-SCNC: 4.1 MMOL/L — SIGNIFICANT CHANGE UP (ref 3.5–5.1)
POTASSIUM BLDA-SCNC: 4.5 MMOL/L — SIGNIFICANT CHANGE UP (ref 3.5–5.1)
POTASSIUM BLDA-SCNC: 4.6 MMOL/L — SIGNIFICANT CHANGE UP (ref 3.5–5.1)
POTASSIUM BLDA-SCNC: 5.4 MMOL/L — HIGH (ref 3.5–5.1)
POTASSIUM BLDA-SCNC: 5.7 MMOL/L — HIGH (ref 3.5–5.1)
POTASSIUM BLDA-SCNC: 6 MMOL/L — HIGH (ref 3.5–5.1)
POTASSIUM BLDA-SCNC: 6.2 MMOL/L — CRITICAL HIGH (ref 3.5–5.1)
POTASSIUM BLDV-SCNC: 4.3 MMOL/L — SIGNIFICANT CHANGE UP (ref 3.5–5.1)
POTASSIUM BLDV-SCNC: 4.4 MMOL/L — SIGNIFICANT CHANGE UP (ref 3.5–5.1)
POTASSIUM BLDV-SCNC: 4.6 MMOL/L — SIGNIFICANT CHANGE UP (ref 3.5–5.1)
POTASSIUM BLDV-SCNC: 5.7 MMOL/L — HIGH (ref 3.5–5.1)
POTASSIUM BLDV-SCNC: 6.2 MMOL/L — CRITICAL HIGH (ref 3.5–5.1)
POTASSIUM SERPL-MCNC: 4.1 MMOL/L — SIGNIFICANT CHANGE UP (ref 3.5–5.3)
POTASSIUM SERPL-MCNC: 4.5 MMOL/L — SIGNIFICANT CHANGE UP (ref 3.5–5.3)
POTASSIUM SERPL-SCNC: 4.1 MMOL/L — SIGNIFICANT CHANGE UP (ref 3.5–5.3)
POTASSIUM SERPL-SCNC: 4.5 MMOL/L — SIGNIFICANT CHANGE UP (ref 3.5–5.3)
PROT SERPL-MCNC: 6 G/DL — LOW (ref 6.6–8.7)
PROTHROM AB SERPL-ACNC: 14.7 SEC — HIGH (ref 10.5–13.4)
RBC # BLD: 4.11 M/UL — LOW (ref 4.2–5.8)
RBC # BLD: 4.52 M/UL — SIGNIFICANT CHANGE UP (ref 4.2–5.8)
RBC # FLD: 15.7 % — HIGH (ref 10.3–14.5)
RBC # FLD: 15.9 % — HIGH (ref 10.3–14.5)
S AUREUS DNA NOSE QL NAA+PROBE: DETECTED
SAO2 % BLDA: 100 % — HIGH (ref 94–98)
SAO2 % BLDA: 100 % — HIGH (ref 94–98)
SAO2 % BLDA: 98.6 % — HIGH (ref 94–98)
SAO2 % BLDA: 99.2 % — HIGH (ref 94–98)
SAO2 % BLDA: 99.5 % — HIGH (ref 94–98)
SAO2 % BLDA: 99.6 % — HIGH (ref 94–98)
SAO2 % BLDA: 99.9 % — HIGH (ref 94–98)
SAO2 % BLDV: 61.7 % — SIGNIFICANT CHANGE UP
SAO2 % BLDV: 68.9 % — SIGNIFICANT CHANGE UP
SAO2 % BLDV: 85.2 % — SIGNIFICANT CHANGE UP (ref 67–88)
SAO2 % BLDV: 88.4 % — HIGH (ref 67–88)
SAO2 % BLDV: 90.7 % — HIGH (ref 67–88)
SODIUM BLDA-SCNC: 135 MMOL/L — LOW (ref 136–145)
SODIUM BLDA-SCNC: 136 MMOL/L — SIGNIFICANT CHANGE UP (ref 136–145)
SODIUM BLDA-SCNC: 138 MMOL/L — SIGNIFICANT CHANGE UP (ref 136–145)
SODIUM BLDA-SCNC: 138 MMOL/L — SIGNIFICANT CHANGE UP (ref 136–145)
SODIUM SERPL-SCNC: 137 MMOL/L — SIGNIFICANT CHANGE UP (ref 135–145)
SODIUM SERPL-SCNC: 140 MMOL/L — SIGNIFICANT CHANGE UP (ref 135–145)
TROPONIN T SERPL-MCNC: 0.41 NG/ML — HIGH (ref 0–0.06)
WBC # BLD: 15.75 K/UL — HIGH (ref 3.8–10.5)
WBC # BLD: 4.22 K/UL — SIGNIFICANT CHANGE UP (ref 3.8–10.5)
WBC # FLD AUTO: 15.75 K/UL — HIGH (ref 3.8–10.5)
WBC # FLD AUTO: 4.22 K/UL — SIGNIFICANT CHANGE UP (ref 3.8–10.5)

## 2023-06-09 PROCEDURE — 33518 CABG ARTERY-VEIN TWO: CPT | Mod: AS

## 2023-06-09 PROCEDURE — 71045 X-RAY EXAM CHEST 1 VIEW: CPT | Mod: 26

## 2023-06-09 PROCEDURE — 33533 CABG ARTERIAL SINGLE: CPT | Mod: AS

## 2023-06-09 PROCEDURE — 33257 ABLATE ATRIA LMTD ADD-ON: CPT | Mod: AS

## 2023-06-09 PROCEDURE — 33257 ABLATE ATRIA LMTD ADD-ON: CPT

## 2023-06-09 PROCEDURE — 88305 TISSUE EXAM BY PATHOLOGIST: CPT | Mod: 26

## 2023-06-09 PROCEDURE — 99291 CRITICAL CARE FIRST HOUR: CPT

## 2023-06-09 PROCEDURE — 33518 CABG ARTERY-VEIN TWO: CPT

## 2023-06-09 PROCEDURE — 33508 ENDOSCOPIC VEIN HARVEST: CPT | Mod: 59

## 2023-06-09 PROCEDURE — 93010 ELECTROCARDIOGRAM REPORT: CPT

## 2023-06-09 PROCEDURE — 33533 CABG ARTERIAL SINGLE: CPT

## 2023-06-09 PROCEDURE — 88331 PATH CONSLTJ SURG 1 BLK 1SPC: CPT | Mod: 26

## 2023-06-09 DEVICE — TISSEEL 4ML: Type: IMPLANTABLE DEVICE | Status: FUNCTIONAL

## 2023-06-09 DEVICE — CANNULA ARTERIAL STRAIGHT 20FR X 3/8" VENTED: Type: IMPLANTABLE DEVICE | Status: FUNCTIONAL

## 2023-06-09 DEVICE — MEDIASTINAL CATH DRAIN 9MM: Type: IMPLANTABLE DEVICE | Status: FUNCTIONAL

## 2023-06-09 DEVICE — PACING WIRE WHITE M-25 WINGED WIRE 37MM X 89MM: Type: IMPLANTABLE DEVICE | Status: FUNCTIONAL

## 2023-06-09 DEVICE — CANNULA VENOUS 2 STAGE OPEN LIGHTHOUSE TIP 32-40FR X 1/2" NON-VENTED: Type: IMPLANTABLE DEVICE | Status: FUNCTIONAL

## 2023-06-09 DEVICE — PACING WIRE ORANGE M-25 WINGED WIRE 37MM X 89MM: Type: IMPLANTABLE DEVICE | Status: FUNCTIONAL

## 2023-06-09 DEVICE — CANNULA AORTIC ROOT 12G X 14CM FLANGED: Type: IMPLANTABLE DEVICE | Status: FUNCTIONAL

## 2023-06-09 DEVICE — LIGATING CLIPS WECK HORIZON SMALL-WIDE (RED) 24: Type: IMPLANTABLE DEVICE | Status: FUNCTIONAL

## 2023-06-09 DEVICE — SURGICEL FIBRILLAR 2 X 4": Type: IMPLANTABLE DEVICE | Status: FUNCTIONAL

## 2023-06-09 DEVICE — KIT A-LINE 1LUM 20G X 12CM SAFE KIT: Type: IMPLANTABLE DEVICE | Status: FUNCTIONAL

## 2023-06-09 DEVICE — LIGATING CLIPS WECK HORIZON MEDIUM (BLUE) 24: Type: IMPLANTABLE DEVICE | Status: FUNCTIONAL

## 2023-06-09 DEVICE — BONE WAX 2.5GM: Type: IMPLANTABLE DEVICE | Status: FUNCTIONAL

## 2023-06-09 DEVICE — CANNULA VESSEL 3MM BLUNT TIP CLEAR 1-WAY VALVE: Type: IMPLANTABLE DEVICE | Status: FUNCTIONAL

## 2023-06-09 DEVICE — ATRICLIP LAA EXCLUSION DEVICE 45MM FLEX-V: Type: IMPLANTABLE DEVICE | Status: FUNCTIONAL

## 2023-06-09 DEVICE — CATH THERMAL OXI SWAN GANZ DILUTION 7.5FR: Type: IMPLANTABLE DEVICE | Status: FUNCTIONAL

## 2023-06-09 DEVICE — KIT CVC 2LUM MAC 9FR CHG: Type: IMPLANTABLE DEVICE | Status: FUNCTIONAL

## 2023-06-09 RX ORDER — SODIUM CHLORIDE 9 MG/ML
1000 INJECTION INTRAMUSCULAR; INTRAVENOUS; SUBCUTANEOUS
Refills: 0 | Status: DISCONTINUED | OUTPATIENT
Start: 2023-06-09 | End: 2023-06-12

## 2023-06-09 RX ORDER — ACETAMINOPHEN 500 MG
1000 TABLET ORAL ONCE
Refills: 0 | Status: COMPLETED | OUTPATIENT
Start: 2023-06-09 | End: 2023-06-09

## 2023-06-09 RX ORDER — PANTOPRAZOLE SODIUM 20 MG/1
40 TABLET, DELAYED RELEASE ORAL DAILY
Refills: 0 | Status: DISCONTINUED | OUTPATIENT
Start: 2023-06-10 | End: 2023-06-16

## 2023-06-09 RX ORDER — INSULIN HUMAN 100 [IU]/ML
2 INJECTION, SOLUTION SUBCUTANEOUS
Qty: 100 | Refills: 0 | Status: DISCONTINUED | OUTPATIENT
Start: 2023-06-09 | End: 2023-06-11

## 2023-06-09 RX ORDER — CHLORHEXIDINE GLUCONATE 213 G/1000ML
15 SOLUTION TOPICAL EVERY 12 HOURS
Refills: 0 | Status: DISCONTINUED | OUTPATIENT
Start: 2023-06-09 | End: 2023-06-09

## 2023-06-09 RX ORDER — CEFUROXIME AXETIL 250 MG
1500 TABLET ORAL ONCE
Refills: 0 | Status: COMPLETED | OUTPATIENT
Start: 2023-06-09 | End: 2023-06-09

## 2023-06-09 RX ORDER — VANCOMYCIN HCL 1 G
1500 VIAL (EA) INTRAVENOUS EVERY 12 HOURS
Refills: 0 | Status: COMPLETED | OUTPATIENT
Start: 2023-06-09 | End: 2023-06-11

## 2023-06-09 RX ORDER — ASPIRIN/CALCIUM CARB/MAGNESIUM 324 MG
81 TABLET ORAL DAILY
Refills: 0 | Status: DISCONTINUED | OUTPATIENT
Start: 2023-06-10 | End: 2023-06-16

## 2023-06-09 RX ORDER — ASPIRIN/CALCIUM CARB/MAGNESIUM 324 MG
81 TABLET ORAL ONCE
Refills: 0 | Status: COMPLETED | OUTPATIENT
Start: 2023-06-09 | End: 2023-06-09

## 2023-06-09 RX ORDER — POTASSIUM CHLORIDE 20 MEQ
10 PACKET (EA) ORAL
Refills: 0 | Status: DISCONTINUED | OUTPATIENT
Start: 2023-06-09 | End: 2023-06-09

## 2023-06-09 RX ORDER — FENTANYL CITRATE 50 UG/ML
100 INJECTION INTRAVENOUS ONCE
Refills: 0 | Status: DISCONTINUED | OUTPATIENT
Start: 2023-06-09 | End: 2023-06-09

## 2023-06-09 RX ORDER — ONDANSETRON 8 MG/1
4 TABLET, FILM COATED ORAL ONCE
Refills: 0 | Status: COMPLETED | OUTPATIENT
Start: 2023-06-09 | End: 2023-06-09

## 2023-06-09 RX ORDER — EPINEPHRINE 0.3 MG/.3ML
0.02 INJECTION INTRAMUSCULAR; SUBCUTANEOUS
Qty: 4 | Refills: 0 | Status: DISCONTINUED | OUTPATIENT
Start: 2023-06-09 | End: 2023-06-09

## 2023-06-09 RX ORDER — SENNA PLUS 8.6 MG/1
2 TABLET ORAL AT BEDTIME
Refills: 0 | Status: DISCONTINUED | OUTPATIENT
Start: 2023-06-10 | End: 2023-06-16

## 2023-06-09 RX ORDER — FENTANYL CITRATE 50 UG/ML
50 INJECTION INTRAVENOUS ONCE
Refills: 0 | Status: DISCONTINUED | OUTPATIENT
Start: 2023-06-09 | End: 2023-06-09

## 2023-06-09 RX ORDER — POLYETHYLENE GLYCOL 3350 17 G/17G
17 POWDER, FOR SOLUTION ORAL DAILY
Refills: 0 | Status: DISCONTINUED | OUTPATIENT
Start: 2023-06-11 | End: 2023-06-16

## 2023-06-09 RX ORDER — DEXTROSE 50 % IN WATER 50 %
50 SYRINGE (ML) INTRAVENOUS
Refills: 0 | Status: DISCONTINUED | OUTPATIENT
Start: 2023-06-09 | End: 2023-06-12

## 2023-06-09 RX ORDER — CEFUROXIME AXETIL 250 MG
TABLET ORAL
Refills: 0 | Status: COMPLETED | OUTPATIENT
Start: 2023-06-09 | End: 2023-06-11

## 2023-06-09 RX ORDER — NOREPINEPHRINE BITARTRATE/D5W 8 MG/250ML
0.05 PLASTIC BAG, INJECTION (ML) INTRAVENOUS
Qty: 8 | Refills: 0 | Status: DISCONTINUED | OUTPATIENT
Start: 2023-06-09 | End: 2023-06-09

## 2023-06-09 RX ORDER — DEXTROSE 50 % IN WATER 50 %
25 SYRINGE (ML) INTRAVENOUS
Refills: 0 | Status: DISCONTINUED | OUTPATIENT
Start: 2023-06-09 | End: 2023-06-12

## 2023-06-09 RX ORDER — CHLORHEXIDINE GLUCONATE 213 G/1000ML
1 SOLUTION TOPICAL
Refills: 0 | Status: DISCONTINUED | OUTPATIENT
Start: 2023-06-09 | End: 2023-06-15

## 2023-06-09 RX ORDER — MILRINONE LACTATE 1 MG/ML
0.2 INJECTION, SOLUTION INTRAVENOUS
Qty: 20 | Refills: 0 | Status: DISCONTINUED | OUTPATIENT
Start: 2023-06-09 | End: 2023-06-10

## 2023-06-09 RX ORDER — CEFUROXIME AXETIL 250 MG
1500 TABLET ORAL EVERY 8 HOURS
Refills: 0 | Status: COMPLETED | OUTPATIENT
Start: 2023-06-09 | End: 2023-06-11

## 2023-06-09 RX ORDER — PANTOPRAZOLE SODIUM 20 MG/1
40 TABLET, DELAYED RELEASE ORAL ONCE
Refills: 0 | Status: COMPLETED | OUTPATIENT
Start: 2023-06-09 | End: 2023-06-09

## 2023-06-09 RX ORDER — HYDROMORPHONE HYDROCHLORIDE 2 MG/ML
0.25 INJECTION INTRAMUSCULAR; INTRAVENOUS; SUBCUTANEOUS ONCE
Refills: 0 | Status: DISCONTINUED | OUTPATIENT
Start: 2023-06-09 | End: 2023-06-09

## 2023-06-09 RX ORDER — ATORVASTATIN CALCIUM 80 MG/1
80 TABLET, FILM COATED ORAL AT BEDTIME
Refills: 0 | Status: DISCONTINUED | OUTPATIENT
Start: 2023-06-10 | End: 2023-06-16

## 2023-06-09 RX ADMIN — SODIUM CHLORIDE 3 MILLILITER(S): 9 INJECTION INTRAMUSCULAR; INTRAVENOUS; SUBCUTANEOUS at 05:46

## 2023-06-09 RX ADMIN — Medication 100 MILLIGRAM(S): at 17:00

## 2023-06-09 RX ADMIN — CHLORHEXIDINE GLUCONATE 1 APPLICATION(S): 213 SOLUTION TOPICAL at 05:42

## 2023-06-09 RX ADMIN — HYDROMORPHONE HYDROCHLORIDE 0.25 MILLIGRAM(S): 2 INJECTION INTRAMUSCULAR; INTRAVENOUS; SUBCUTANEOUS at 22:19

## 2023-06-09 RX ADMIN — Medication 11.2 MICROGRAM(S)/KG/MIN: at 17:31

## 2023-06-09 RX ADMIN — FENTANYL CITRATE 100 MICROGRAM(S): 50 INJECTION INTRAVENOUS at 14:30

## 2023-06-09 RX ADMIN — Medication 81 MILLIGRAM(S): at 22:20

## 2023-06-09 RX ADMIN — FENTANYL CITRATE 50 MICROGRAM(S): 50 INJECTION INTRAVENOUS at 17:45

## 2023-06-09 RX ADMIN — INSULIN HUMAN 2 UNIT(S)/HR: 100 INJECTION, SOLUTION SUBCUTANEOUS at 17:34

## 2023-06-09 RX ADMIN — Medication 300 MILLIGRAM(S): at 19:40

## 2023-06-09 RX ADMIN — Medication 1000 MILLIGRAM(S): at 16:45

## 2023-06-09 RX ADMIN — FENTANYL CITRATE 100 MICROGRAM(S): 50 INJECTION INTRAVENOUS at 14:15

## 2023-06-09 RX ADMIN — Medication 60 MILLIGRAM(S): at 05:42

## 2023-06-09 RX ADMIN — ONDANSETRON 4 MILLIGRAM(S): 8 TABLET, FILM COATED ORAL at 20:30

## 2023-06-09 RX ADMIN — Medication 1000 MILLIGRAM(S): at 23:40

## 2023-06-09 RX ADMIN — CHLORHEXIDINE GLUCONATE 15 MILLILITER(S): 213 SOLUTION TOPICAL at 05:42

## 2023-06-09 RX ADMIN — Medication 400 MILLIGRAM(S): at 23:10

## 2023-06-09 RX ADMIN — SODIUM CHLORIDE 5 MILLILITER(S): 9 INJECTION INTRAMUSCULAR; INTRAVENOUS; SUBCUTANEOUS at 17:32

## 2023-06-09 RX ADMIN — HYDROMORPHONE HYDROCHLORIDE 0.25 MILLIGRAM(S): 2 INJECTION INTRAMUSCULAR; INTRAVENOUS; SUBCUTANEOUS at 22:34

## 2023-06-09 RX ADMIN — Medication 400 MILLIGRAM(S): at 16:30

## 2023-06-09 RX ADMIN — MILRINONE LACTATE 7.02 MICROGRAM(S)/KG/MIN: 1 INJECTION, SOLUTION INTRAVENOUS at 17:32

## 2023-06-09 RX ADMIN — EPINEPHRINE 8.95 MICROGRAM(S)/KG/MIN: 0.3 INJECTION INTRAMUSCULAR; SUBCUTANEOUS at 17:31

## 2023-06-09 RX ADMIN — Medication 100 MILLIGRAM(S): at 05:42

## 2023-06-09 RX ADMIN — MUPIROCIN 1 APPLICATION(S): 20 OINTMENT TOPICAL at 05:46

## 2023-06-09 RX ADMIN — Medication 100 MILLIGRAM(S): at 22:20

## 2023-06-09 RX ADMIN — FENTANYL CITRATE 50 MICROGRAM(S): 50 INJECTION INTRAVENOUS at 17:30

## 2023-06-09 RX ADMIN — PANTOPRAZOLE SODIUM 40 MILLIGRAM(S): 20 TABLET, DELAYED RELEASE ORAL at 22:19

## 2023-06-09 RX ADMIN — SODIUM CHLORIDE 10 MILLILITER(S): 9 INJECTION INTRAMUSCULAR; INTRAVENOUS; SUBCUTANEOUS at 17:32

## 2023-06-09 RX ADMIN — CHLORHEXIDINE GLUCONATE 15 MILLILITER(S): 213 SOLUTION TOPICAL at 18:14

## 2023-06-09 NOTE — BRIEF OPERATIVE NOTE - NS MD BRIEF OP PACING WIRES
Call Dr. Knox office [phone] to schedule a follow up visit.    Follow up with your cardiologist/PCP within next 5 days.  You must call to make an appointment.
Ventricular

## 2023-06-09 NOTE — BRIEF OPERATIVE NOTE - NSICDXBRIEFPROCEDURE_GEN_ALL_CORE_FT
PROCEDURES:  CABG, with EVELYN 09-Jun-2023 17:08:36 CABGx3 LIMA-LAD, Vein-OM, Diag.  Encompass RF Ablation, 45mm Atriclip Torey Funes

## 2023-06-09 NOTE — BRIEF OPERATIVE NOTE - COMMENTS
No qualified resident was available to assist in this case. I have personally first assisted the Cardiothoracic Surgeon listed in this brief op note throughout the entirety of this case.  cell saver used, cannot calculate ebl

## 2023-06-10 DIAGNOSIS — Z29.9 ENCOUNTER FOR PROPHYLACTIC MEASURES, UNSPECIFIED: ICD-10-CM

## 2023-06-10 LAB
ALBUMIN SERPL ELPH-MCNC: 3.4 G/DL — SIGNIFICANT CHANGE UP (ref 3.3–5.2)
ALP SERPL-CCNC: 105 U/L — SIGNIFICANT CHANGE UP (ref 40–120)
ALT FLD-CCNC: 21 U/L — SIGNIFICANT CHANGE UP
ANION GAP SERPL CALC-SCNC: 12 MMOL/L — SIGNIFICANT CHANGE UP (ref 5–17)
ANION GAP SERPL CALC-SCNC: 14 MMOL/L — SIGNIFICANT CHANGE UP (ref 5–17)
APTT BLD: 19.3 SEC — LOW (ref 27.5–35.5)
AST SERPL-CCNC: 46 U/L — HIGH
BILIRUB SERPL-MCNC: 0.2 MG/DL — LOW (ref 0.4–2)
BUN SERPL-MCNC: 22.5 MG/DL — HIGH (ref 8–20)
BUN SERPL-MCNC: 22.7 MG/DL — HIGH (ref 8–20)
CALCIUM SERPL-MCNC: 7.9 MG/DL — LOW (ref 8.4–10.5)
CALCIUM SERPL-MCNC: 8.8 MG/DL — SIGNIFICANT CHANGE UP (ref 8.4–10.5)
CHLORIDE SERPL-SCNC: 108 MMOL/L — SIGNIFICANT CHANGE UP (ref 96–108)
CHLORIDE SERPL-SCNC: 99 MMOL/L — SIGNIFICANT CHANGE UP (ref 96–108)
CK MB CFR SERPL CALC: 21.2 NG/ML — HIGH (ref 0–6.7)
CK SERPL-CCNC: 508 U/L — HIGH (ref 30–200)
CO2 SERPL-SCNC: 19 MMOL/L — LOW (ref 22–29)
CO2 SERPL-SCNC: 19 MMOL/L — LOW (ref 22–29)
CREAT SERPL-MCNC: 0.9 MG/DL — SIGNIFICANT CHANGE UP (ref 0.5–1.3)
CREAT SERPL-MCNC: 0.93 MG/DL — SIGNIFICANT CHANGE UP (ref 0.5–1.3)
EGFR: 101 ML/MIN/1.73M2 — SIGNIFICANT CHANGE UP
EGFR: 97 ML/MIN/1.73M2 — SIGNIFICANT CHANGE UP
GLUCOSE BLDC GLUCOMTR-MCNC: 109 MG/DL — HIGH (ref 70–99)
GLUCOSE BLDC GLUCOMTR-MCNC: 111 MG/DL — HIGH (ref 70–99)
GLUCOSE BLDC GLUCOMTR-MCNC: 116 MG/DL — HIGH (ref 70–99)
GLUCOSE BLDC GLUCOMTR-MCNC: 117 MG/DL — HIGH (ref 70–99)
GLUCOSE BLDC GLUCOMTR-MCNC: 119 MG/DL — HIGH (ref 70–99)
GLUCOSE BLDC GLUCOMTR-MCNC: 120 MG/DL — HIGH (ref 70–99)
GLUCOSE BLDC GLUCOMTR-MCNC: 122 MG/DL — HIGH (ref 70–99)
GLUCOSE BLDC GLUCOMTR-MCNC: 123 MG/DL — HIGH (ref 70–99)
GLUCOSE BLDC GLUCOMTR-MCNC: 125 MG/DL — HIGH (ref 70–99)
GLUCOSE BLDC GLUCOMTR-MCNC: 125 MG/DL — HIGH (ref 70–99)
GLUCOSE BLDC GLUCOMTR-MCNC: 130 MG/DL — HIGH (ref 70–99)
GLUCOSE BLDC GLUCOMTR-MCNC: 133 MG/DL — HIGH (ref 70–99)
GLUCOSE BLDC GLUCOMTR-MCNC: 135 MG/DL — HIGH (ref 70–99)
GLUCOSE BLDC GLUCOMTR-MCNC: 136 MG/DL — HIGH (ref 70–99)
GLUCOSE BLDC GLUCOMTR-MCNC: 144 MG/DL — HIGH (ref 70–99)
GLUCOSE BLDC GLUCOMTR-MCNC: 146 MG/DL — HIGH (ref 70–99)
GLUCOSE BLDC GLUCOMTR-MCNC: 154 MG/DL — HIGH (ref 70–99)
GLUCOSE BLDC GLUCOMTR-MCNC: 158 MG/DL — HIGH (ref 70–99)
GLUCOSE BLDC GLUCOMTR-MCNC: 162 MG/DL — HIGH (ref 70–99)
GLUCOSE BLDC GLUCOMTR-MCNC: 173 MG/DL — HIGH (ref 70–99)
GLUCOSE SERPL-MCNC: 125 MG/DL — HIGH (ref 70–99)
GLUCOSE SERPL-MCNC: 280 MG/DL — HIGH (ref 70–99)
HCT VFR BLD CALC: 27.9 % — LOW (ref 39–50)
HGB BLD-MCNC: 8.9 G/DL — LOW (ref 13–17)
INR BLD: 1.14 RATIO — SIGNIFICANT CHANGE UP (ref 0.88–1.16)
LACTATE SERPL-SCNC: 2.5 MMOL/L — HIGH (ref 0.5–2)
MAGNESIUM SERPL-MCNC: 1.9 MG/DL — SIGNIFICANT CHANGE UP (ref 1.6–2.6)
MAGNESIUM SERPL-MCNC: 2.1 MG/DL — SIGNIFICANT CHANGE UP (ref 1.8–2.6)
MCHC RBC-ENTMCNC: 24.4 PG — LOW (ref 27–34)
MCHC RBC-ENTMCNC: 31.9 GM/DL — LOW (ref 32–36)
MCV RBC AUTO: 76.4 FL — LOW (ref 80–100)
PLATELET # BLD AUTO: 176 K/UL — SIGNIFICANT CHANGE UP (ref 150–400)
POTASSIUM SERPL-MCNC: 4 MMOL/L — SIGNIFICANT CHANGE UP (ref 3.5–5.3)
POTASSIUM SERPL-MCNC: 4.3 MMOL/L — SIGNIFICANT CHANGE UP (ref 3.5–5.3)
POTASSIUM SERPL-SCNC: 4 MMOL/L — SIGNIFICANT CHANGE UP (ref 3.5–5.3)
POTASSIUM SERPL-SCNC: 4.3 MMOL/L — SIGNIFICANT CHANGE UP (ref 3.5–5.3)
PROT SERPL-MCNC: 6.2 G/DL — LOW (ref 6.6–8.7)
PROTHROM AB SERPL-ACNC: 13.3 SEC — SIGNIFICANT CHANGE UP (ref 10.5–13.4)
RBC # BLD: 3.65 M/UL — LOW (ref 4.2–5.8)
RBC # FLD: 15.7 % — HIGH (ref 10.3–14.5)
SODIUM SERPL-SCNC: 131 MMOL/L — LOW (ref 135–145)
SODIUM SERPL-SCNC: 139 MMOL/L — SIGNIFICANT CHANGE UP (ref 135–145)
TROPONIN T SERPL-MCNC: 0.53 NG/ML — HIGH (ref 0–0.06)
WBC # BLD: 10.27 K/UL — SIGNIFICANT CHANGE UP (ref 3.8–10.5)
WBC # FLD AUTO: 10.27 K/UL — SIGNIFICANT CHANGE UP (ref 3.8–10.5)

## 2023-06-10 PROCEDURE — 99024 POSTOP FOLLOW-UP VISIT: CPT

## 2023-06-10 PROCEDURE — 99291 CRITICAL CARE FIRST HOUR: CPT

## 2023-06-10 PROCEDURE — 93010 ELECTROCARDIOGRAM REPORT: CPT | Mod: 76

## 2023-06-10 PROCEDURE — 71045 X-RAY EXAM CHEST 1 VIEW: CPT | Mod: 26

## 2023-06-10 RX ORDER — METOPROLOL TARTRATE 50 MG
50 TABLET ORAL
Refills: 0 | Status: DISCONTINUED | OUTPATIENT
Start: 2023-06-10 | End: 2023-06-11

## 2023-06-10 RX ORDER — MAGNESIUM SULFATE 500 MG/ML
2 VIAL (ML) INJECTION ONCE
Refills: 0 | Status: COMPLETED | OUTPATIENT
Start: 2023-06-10 | End: 2023-06-10

## 2023-06-10 RX ORDER — METOCLOPRAMIDE HCL 10 MG
10 TABLET ORAL EVERY 8 HOURS
Refills: 0 | Status: COMPLETED | OUTPATIENT
Start: 2023-06-10 | End: 2023-06-11

## 2023-06-10 RX ORDER — METOPROLOL TARTRATE 50 MG
25 TABLET ORAL
Refills: 0 | Status: DISCONTINUED | OUTPATIENT
Start: 2023-06-10 | End: 2023-06-10

## 2023-06-10 RX ORDER — HYDROMORPHONE HYDROCHLORIDE 2 MG/ML
0.5 INJECTION INTRAMUSCULAR; INTRAVENOUS; SUBCUTANEOUS ONCE
Refills: 0 | Status: DISCONTINUED | OUTPATIENT
Start: 2023-06-10 | End: 2023-06-10

## 2023-06-10 RX ORDER — AMIODARONE HYDROCHLORIDE 400 MG/1
150 TABLET ORAL ONCE
Refills: 0 | Status: COMPLETED | OUTPATIENT
Start: 2023-06-10 | End: 2023-06-10

## 2023-06-10 RX ORDER — OXYCODONE HYDROCHLORIDE 5 MG/1
10 TABLET ORAL EVERY 4 HOURS
Refills: 0 | Status: DISCONTINUED | OUTPATIENT
Start: 2023-06-10 | End: 2023-06-13

## 2023-06-10 RX ORDER — AMIODARONE HYDROCHLORIDE 400 MG/1
1 TABLET ORAL
Qty: 450 | Refills: 0 | Status: DISCONTINUED | OUTPATIENT
Start: 2023-06-10 | End: 2023-06-11

## 2023-06-10 RX ORDER — ENOXAPARIN SODIUM 100 MG/ML
40 INJECTION SUBCUTANEOUS EVERY 24 HOURS
Refills: 0 | Status: DISCONTINUED | OUTPATIENT
Start: 2023-06-10 | End: 2023-06-13

## 2023-06-10 RX ORDER — AMIODARONE HYDROCHLORIDE 400 MG/1
0.5 TABLET ORAL
Qty: 450 | Refills: 0 | Status: DISCONTINUED | OUTPATIENT
Start: 2023-06-11 | End: 2023-06-11

## 2023-06-10 RX ORDER — AMIODARONE HYDROCHLORIDE 400 MG/1
200 TABLET ORAL DAILY
Refills: 0 | Status: DISCONTINUED | OUTPATIENT
Start: 2023-06-10 | End: 2023-06-10

## 2023-06-10 RX ORDER — GABAPENTIN 400 MG/1
300 CAPSULE ORAL EVERY 8 HOURS
Refills: 0 | Status: COMPLETED | OUTPATIENT
Start: 2023-06-10 | End: 2023-06-12

## 2023-06-10 RX ORDER — OXYCODONE HYDROCHLORIDE 5 MG/1
5 TABLET ORAL EVERY 4 HOURS
Refills: 0 | Status: DISCONTINUED | OUTPATIENT
Start: 2023-06-10 | End: 2023-06-13

## 2023-06-10 RX ORDER — METOPROLOL TARTRATE 50 MG
25 TABLET ORAL ONCE
Refills: 0 | Status: COMPLETED | OUTPATIENT
Start: 2023-06-10 | End: 2023-06-10

## 2023-06-10 RX ORDER — NICARDIPINE HYDROCHLORIDE 30 MG/1
5 CAPSULE, EXTENDED RELEASE ORAL
Qty: 40 | Refills: 0 | Status: DISCONTINUED | OUTPATIENT
Start: 2023-06-10 | End: 2023-06-10

## 2023-06-10 RX ORDER — ACETAMINOPHEN 500 MG
1000 TABLET ORAL ONCE
Refills: 0 | Status: COMPLETED | OUTPATIENT
Start: 2023-06-10 | End: 2023-06-10

## 2023-06-10 RX ORDER — METHOCARBAMOL 500 MG/1
500 TABLET, FILM COATED ORAL EVERY 6 HOURS
Refills: 0 | Status: COMPLETED | OUTPATIENT
Start: 2023-06-10 | End: 2023-06-12

## 2023-06-10 RX ADMIN — Medication 10 MILLIGRAM(S): at 06:41

## 2023-06-10 RX ADMIN — HYDROMORPHONE HYDROCHLORIDE 0.5 MILLIGRAM(S): 2 INJECTION INTRAMUSCULAR; INTRAVENOUS; SUBCUTANEOUS at 04:35

## 2023-06-10 RX ADMIN — ENOXAPARIN SODIUM 40 MILLIGRAM(S): 100 INJECTION SUBCUTANEOUS at 11:37

## 2023-06-10 RX ADMIN — Medication 25 MILLIGRAM(S): at 06:41

## 2023-06-10 RX ADMIN — METHOCARBAMOL 500 MILLIGRAM(S): 500 TABLET, FILM COATED ORAL at 11:37

## 2023-06-10 RX ADMIN — OXYCODONE HYDROCHLORIDE 10 MILLIGRAM(S): 5 TABLET ORAL at 19:51

## 2023-06-10 RX ADMIN — PANTOPRAZOLE SODIUM 40 MILLIGRAM(S): 20 TABLET, DELAYED RELEASE ORAL at 11:37

## 2023-06-10 RX ADMIN — Medication 10 MILLIGRAM(S): at 14:11

## 2023-06-10 RX ADMIN — ATORVASTATIN CALCIUM 80 MILLIGRAM(S): 80 TABLET, FILM COATED ORAL at 21:20

## 2023-06-10 RX ADMIN — Medication 300 MILLIGRAM(S): at 21:18

## 2023-06-10 RX ADMIN — AMIODARONE HYDROCHLORIDE 200 MILLIGRAM(S): 400 TABLET ORAL at 11:36

## 2023-06-10 RX ADMIN — AMIODARONE HYDROCHLORIDE 33.3 MG/MIN: 400 TABLET ORAL at 19:39

## 2023-06-10 RX ADMIN — Medication 50 MILLIGRAM(S): at 18:07

## 2023-06-10 RX ADMIN — GABAPENTIN 300 MILLIGRAM(S): 400 CAPSULE ORAL at 14:11

## 2023-06-10 RX ADMIN — OXYCODONE HYDROCHLORIDE 10 MILLIGRAM(S): 5 TABLET ORAL at 13:14

## 2023-06-10 RX ADMIN — METHOCARBAMOL 500 MILLIGRAM(S): 500 TABLET, FILM COATED ORAL at 23:00

## 2023-06-10 RX ADMIN — INSULIN HUMAN 2 UNIT(S)/HR: 100 INJECTION, SOLUTION SUBCUTANEOUS at 09:04

## 2023-06-10 RX ADMIN — Medication 25 MILLIGRAM(S): at 09:04

## 2023-06-10 RX ADMIN — NICARDIPINE HYDROCHLORIDE 25 MG/HR: 30 CAPSULE, EXTENDED RELEASE ORAL at 09:02

## 2023-06-10 RX ADMIN — GABAPENTIN 300 MILLIGRAM(S): 400 CAPSULE ORAL at 21:20

## 2023-06-10 RX ADMIN — Medication 1000 MILLIGRAM(S): at 05:45

## 2023-06-10 RX ADMIN — OXYCODONE HYDROCHLORIDE 10 MILLIGRAM(S): 5 TABLET ORAL at 13:44

## 2023-06-10 RX ADMIN — OXYCODONE HYDROCHLORIDE 10 MILLIGRAM(S): 5 TABLET ORAL at 08:55

## 2023-06-10 RX ADMIN — HYDROMORPHONE HYDROCHLORIDE 0.5 MILLIGRAM(S): 2 INJECTION INTRAMUSCULAR; INTRAVENOUS; SUBCUTANEOUS at 01:29

## 2023-06-10 RX ADMIN — METHOCARBAMOL 500 MILLIGRAM(S): 500 TABLET, FILM COATED ORAL at 18:08

## 2023-06-10 RX ADMIN — OXYCODONE HYDROCHLORIDE 10 MILLIGRAM(S): 5 TABLET ORAL at 20:21

## 2023-06-10 RX ADMIN — Medication 400 MILLIGRAM(S): at 05:30

## 2023-06-10 RX ADMIN — CHLORHEXIDINE GLUCONATE 1 APPLICATION(S): 213 SOLUTION TOPICAL at 04:55

## 2023-06-10 RX ADMIN — HYDROMORPHONE HYDROCHLORIDE 0.5 MILLIGRAM(S): 2 INJECTION INTRAMUSCULAR; INTRAVENOUS; SUBCUTANEOUS at 01:44

## 2023-06-10 RX ADMIN — Medication 300 MILLIGRAM(S): at 08:01

## 2023-06-10 RX ADMIN — Medication 81 MILLIGRAM(S): at 11:36

## 2023-06-10 RX ADMIN — Medication 100 MILLIGRAM(S): at 04:55

## 2023-06-10 RX ADMIN — Medication 10 MILLIGRAM(S): at 21:21

## 2023-06-10 RX ADMIN — HYDROMORPHONE HYDROCHLORIDE 0.5 MILLIGRAM(S): 2 INJECTION INTRAMUSCULAR; INTRAVENOUS; SUBCUTANEOUS at 05:00

## 2023-06-10 RX ADMIN — Medication 25 GRAM(S): at 19:51

## 2023-06-10 RX ADMIN — Medication 100 MILLIGRAM(S): at 14:11

## 2023-06-10 RX ADMIN — OXYCODONE HYDROCHLORIDE 10 MILLIGRAM(S): 5 TABLET ORAL at 08:25

## 2023-06-10 RX ADMIN — SENNA PLUS 2 TABLET(S): 8.6 TABLET ORAL at 21:20

## 2023-06-10 RX ADMIN — AMIODARONE HYDROCHLORIDE 618 MILLIGRAM(S): 400 TABLET ORAL at 18:33

## 2023-06-10 RX ADMIN — Medication 100 MILLIGRAM(S): at 21:19

## 2023-06-10 NOTE — DIETITIAN INITIAL EVALUATION ADULT - NSICDXPASTMEDICALHX_GEN_ALL_CORE_FT
Yes
PAST MEDICAL HISTORY:  Afib     History of right hip replacement     HTN (hypertension)     Obesity, morbid     Osteoarthritis

## 2023-06-10 NOTE — PATIENT PROFILE ADULT - FALL HARM RISK - HARM RISK INTERVENTIONS

## 2023-06-10 NOTE — PHYSICAL THERAPY INITIAL EVALUATION ADULT - ADDITIONAL COMMENTS
Patient lives in a house with 2 TRAMAINE (no railing) and one stair inside. He lives with his wife who is able to assist. He was independent prior to admission using a cane for ambulation, also has a RW, wheelchair, Shower chair, commode.

## 2023-06-10 NOTE — DIETITIAN INITIAL EVALUATION ADULT - PERTINENT MEDS FT
MEDICATIONS  (STANDING):  aMIOdarone    Tablet 200 milliGRAM(s) Oral daily  aspirin enteric coated 81 milliGRAM(s) Oral daily  atorvastatin 80 milliGRAM(s) Oral at bedtime  cefuroxime  IVPB      cefuroxime  IVPB 1500 milliGRAM(s) IV Intermittent every 8 hours  chlorhexidine 2% Cloths 1 Application(s) Topical <User Schedule>  dextrose 50% Injectable 25 milliLiter(s) IV Push every 15 minutes  dextrose 50% Injectable 50 milliLiter(s) IV Push every 15 minutes  enoxaparin Injectable 40 milliGRAM(s) SubCutaneous every 24 hours  gabapentin 300 milliGRAM(s) Oral every 8 hours  insulin regular Infusion 2 Unit(s)/Hr (2 mL/Hr) IV Continuous <Continuous>  methocarbamol 500 milliGRAM(s) Oral every 6 hours  metoclopramide Injectable 10 milliGRAM(s) IV Push every 8 hours  metoprolol tartrate 50 milliGRAM(s) Oral two times a day  niCARdipine Infusion 5 mG/Hr (25 mL/Hr) IV Continuous <Continuous>  pantoprazole    Tablet 40 milliGRAM(s) Oral daily  senna 2 Tablet(s) Oral at bedtime  sodium chloride 0.9%. 1000 milliLiter(s) (10 mL/Hr) IV Continuous <Continuous>  sodium chloride 0.9%. 1000 milliLiter(s) (5 mL/Hr) IV Continuous <Continuous>  vancomycin  IVPB 1500 milliGRAM(s) IV Intermittent every 12 hours    MEDICATIONS  (PRN):  oxyCODONE    IR 5 milliGRAM(s) Oral every 4 hours PRN Moderate Pain (4 - 6)  oxyCODONE    IR 10 milliGRAM(s) Oral every 4 hours PRN Severe Pain (7 - 10)

## 2023-06-10 NOTE — DIETITIAN INITIAL EVALUATION ADULT - PERTINENT LABORATORY DATA
06-10    139  |  108  |  22.7<H>  ----------------------------<  125<H>  4.3   |  19.0<L>  |  0.93    Ca    8.8      10 Thanh 2023 02:00  Mg     2.1     06-10    TPro  6.2<L>  /  Alb  3.4  /  TBili  0.2<L>  /  DBili  x   /  AST  46<H>  /  ALT  21  /  AlkPhos  105  06-10  POCT Blood Glucose.: 120 mg/dL (06-10-23 @ 14:06)  A1C with Estimated Average Glucose Result: 5.5 % (06-08-23 @ 14:40)

## 2023-06-10 NOTE — DIETITIAN INITIAL EVALUATION ADULT - OTHER INFO
57yMale daily Marijuana smoker with PMHx Afib, HTN, osteoarthritis, R hip replacement which subsequently required 6 revisions received IV ABX via PICC line completed approx 3/2023), presented to [facility] ED on 6/7/23 with unstable angina. Pt had a LHC on 6/8/23 with Dr. Sanchez which revealed MVD. Admitted on 6/8/2023 for pre-op CABG with Dr. Duke. Underwent CABGx3, encompass procedure, BHARTI clip on 6/9.     Nutrition assessment completed. Pt reports good appetite/po intake prior to admission and currently. Pt states he follows a regular diet at home and typically consumes a lot of fried foods and soda. Pt states he gained ~15 lbs recently due to being inactive and overall having a poor diet. Pt states he knows he needs to make dietary changes and was receptive to diet education. Provided with verbal/written literature on heart healthy nutrition therapy. RD to follow up.

## 2023-06-10 NOTE — DIETITIAN INITIAL EVALUATION ADULT - ENTER TO (CAL/KG)
Chief complaint:   Chief Complaint   Patient presents with   • Physical       Vitals:  Visit Vitals  /90 (BP Location: [facility], Cuff Size: Regular)   Pulse 64   Resp 16   Ht 5' 8\" (1.727 m)   Wt 88.9 kg   BMI 29.80 kg/m²       HISTORY OF PRESENT ILLNESS     HPI  Patient is a 63-year-old female presents for annual checkup. Review of systems presently is unremarkable except that she is getting over a cold.  Other significant problems:  Patient Active Problem List    Diagnosis Date Noted   • Pruritic rash 2015     Priority: Low       PAST MEDICAL, FAMILY AND SOCIAL HISTORY     Medications:  Current Outpatient Prescriptions   Medication   • traMADol (ULTRAM) 50 MG tablet   • celecoxib (CELEBREX) 200 MG capsule   • omeprazole (PRILOSEC) 20 MG capsule   • Probiotic Product (PROBIOTIC PO)   • MELATONIN PO   • DISPENSE   • DISPENSE   • pimecrolimus (ELIDEL) 1 % cream     No current facility-administered medications for this visit.        Allergies:  ALLERGIES:   Allergen Reactions   • Cat Dander Other (See Comments)   • Macrobid [Nitrofurantoin] NAUSEA       Past Medical  History/Surgeries:  Past Medical History:   Diagnosis Date   • Osteoarthritis        Past Surgical History:   Procedure Laterality Date   •  SECTION, LOW TRANSVERSE      x2   • COLONOSCOPY  2013   • DEXA BONE DENSITY AXIAL SKELETON  2011   • MAMMO SCREENING BILATERAL  2013   • MAMMO SCREENING BILATERAL  2015   • MAMMO SCREENING BILATERAL  2016   • MAMMO SCREENING BILATERAL  2017       Family History:  Family History   Problem Relation Age of Onset   • Cancer Father      lung, smoker   • Breast cancer Mother 60   • Breast cancer Sister 49   • Hypertension Sister    • Hypertension Brother    • Breast cancer Maternal Aunt 70   • Heart disease Maternal Grandmother    • Diabetes Paternal Grandmother        Social History:  Social History   Substance Use Topics   • Smoking status: Former Smoker     Quit date:  11/1/1989   • Smokeless tobacco: Never Used   • Alcohol use Yes      Comment: occasional        REVIEW OF SYSTEMS     Review of Systems   Constitutional: Negative.    HENT: Negative.    Eyes: Negative.    Respiratory: Negative.    Cardiovascular: Negative.    Gastrointestinal: Negative.    Endocrine: Negative.    Genitourinary: Negative.    Musculoskeletal: Negative.    Skin: Negative.    Allergic/Immunologic: Negative.    Neurological: Negative.    Hematological: Negative.    Psychiatric/Behavioral: Negative.        PHYSICAL EXAM     Physical Exam  Constitutional:  Alert and oriented. Well hydrated and nourished. In no acute distress.   Skin:  No masses or rashes.  Eyes:  PERRLA (pupils equal, round, and reactive to light and accommodation), EOMI (extraocular movements are intact), conjunctivae normal.  HENT:  Ear canals and tympanic membranes normal bilaterally. Nasal mucosa normal. Oropharyngeal mucosa normal. Tongue and uvula at midline.  Neck:  No masses noted. Trachea at midline. Thyroid nonpalpable.  Nodes:  No cervical, supraclavicular, axillary, or inguinal adenopathy noted.  Lungs:  Clear to auscultation without rhonchi, rales, or wheezes.   Heart:  Regular rate and rhythm without murmur, S3 or S4. No carotid bruits. No abdominal bruit. Radial, femoral, posterior tibial, and dorsalis pedis pulses normal bilaterally. No lower extremity edema.  Abdomen:  Soft, nontender, normal bowel sounds present. No herniations noted. Liver and spleen not enlarged.  Musculoskeletal:  Normal range of motion of upper and lower extremity joints bilaterally. No swelling of bilateral upper and lower extremity joints. Motor tone of upper and lower extremities normal bilaterally.  Neurological:  Cranial nerves II through XII normal. Motor and sensory of bilateral upper and lower extremities normal.  Psychological:  Alert and oriented x3. Mood and affect normal.        ASSESSMENT/PLAN     1. Annual exam. Order for routine labs  given. Regarding slight elevation of the blood pressure patient states she has a blood pressure machine at home and will check her readings and then let me know if they're staying high. Otherwise he may be related to her recent upper respiratory infection and the fact that she was just exercising prior to coming here.  2. Follow-up one year.   30

## 2023-06-10 NOTE — PHYSICAL THERAPY INITIAL EVALUATION ADULT - GENERAL OBSERVATIONS, REHAB EVAL
Patient received seated in chair, NAD, breathing 3LO2, +IV, +A-line, +IJ line, +monitor, +pacer box, +chest tube x2, +castillo. Pt agreeable to Physical Therapy evaluation.

## 2023-06-10 NOTE — PROGRESS NOTE ADULT - ASSESSMENT
57yMale daily Marijuana smoker with PMHx Afib, HTN, osteoarthritis, R hip replacement which subsequently required 6 revisions received IV ABX via PICC line completed approx 3/2023), presented to [facility] ED on 6/7/23 with unstable angina. Pt had a LHC on 6/8/23 with Dr. Sanchez which revealed MVD. Admitted on 6/8/2023 for pre-op CABG with Dr. Duke. Underwent CABGx3, encompass procedure, BHARTI clip on 6/9.

## 2023-06-10 NOTE — PHYSICAL THERAPY INITIAL EVALUATION ADULT - PERTINENT HX OF CURRENT PROBLEM, REHAB EVAL
57yMale daily Marijuana smoker with PMHx Afib, HTN, osteoarthritis, R hip replacement which subsequently required 6 revisions received IV ABX via PICC line completed approx 3/2023), presented to [facility] ED on 6/7/23 with unstable angina. Pt had a LHC on 6/8/23 with Dr. Sanchez which revealed MVD. Admitted on 6/8/2023 for pre-op CABG with Dr. Duke. Underwent CABGx3, encompass procedure, BHARTI clip on 6/9.

## 2023-06-10 NOTE — PATIENT PROFILE ADULT - FUNCTIONAL ASSESSMENT - BASIC MOBILITY 6.
2-calculated by average/Not able to assess (calculate score using [facility] averaging method)

## 2023-06-10 NOTE — DIETITIAN INITIAL EVALUATION ADULT - NSFNSGIIOFT_GEN_A_CORE
06-09-23 @ 07:01  -  06-10-23 @ 07:00  --------------------------------------------------------  OUT:    Chest Tube (mL): 500 mL    Chest Tube (mL): 5 mL  Total OUT: 505 mL    Total NET: -505 mL      06-10-23 @ 07:01  -  06-10-23 @ 14:13  --------------------------------------------------------  OUT:    Chest Tube (mL): 10 mL    Chest Tube (mL): 200 mL  Total OUT: 210 mL    Total NET: -210 mL

## 2023-06-11 LAB
ANION GAP SERPL CALC-SCNC: 10 MMOL/L — SIGNIFICANT CHANGE UP (ref 5–17)
BUN SERPL-MCNC: 22.3 MG/DL — HIGH (ref 8–20)
CALCIUM SERPL-MCNC: 8.6 MG/DL — SIGNIFICANT CHANGE UP (ref 8.4–10.5)
CHLORIDE SERPL-SCNC: 104 MMOL/L — SIGNIFICANT CHANGE UP (ref 96–108)
CO2 SERPL-SCNC: 24 MMOL/L — SIGNIFICANT CHANGE UP (ref 22–29)
CREAT SERPL-MCNC: 0.82 MG/DL — SIGNIFICANT CHANGE UP (ref 0.5–1.3)
EGFR: 104 ML/MIN/1.73M2 — SIGNIFICANT CHANGE UP
GLUCOSE BLDC GLUCOMTR-MCNC: 104 MG/DL — HIGH (ref 70–99)
GLUCOSE BLDC GLUCOMTR-MCNC: 106 MG/DL — HIGH (ref 70–99)
GLUCOSE BLDC GLUCOMTR-MCNC: 110 MG/DL — HIGH (ref 70–99)
GLUCOSE BLDC GLUCOMTR-MCNC: 114 MG/DL — HIGH (ref 70–99)
GLUCOSE BLDC GLUCOMTR-MCNC: 116 MG/DL — HIGH (ref 70–99)
GLUCOSE BLDC GLUCOMTR-MCNC: 120 MG/DL — HIGH (ref 70–99)
GLUCOSE BLDC GLUCOMTR-MCNC: 120 MG/DL — HIGH (ref 70–99)
GLUCOSE BLDC GLUCOMTR-MCNC: 121 MG/DL — HIGH (ref 70–99)
GLUCOSE BLDC GLUCOMTR-MCNC: 123 MG/DL — HIGH (ref 70–99)
GLUCOSE BLDC GLUCOMTR-MCNC: 125 MG/DL — HIGH (ref 70–99)
GLUCOSE BLDC GLUCOMTR-MCNC: 126 MG/DL — HIGH (ref 70–99)
GLUCOSE BLDC GLUCOMTR-MCNC: 129 MG/DL — HIGH (ref 70–99)
GLUCOSE BLDC GLUCOMTR-MCNC: 134 MG/DL — HIGH (ref 70–99)
GLUCOSE BLDC GLUCOMTR-MCNC: 140 MG/DL — HIGH (ref 70–99)
GLUCOSE BLDC GLUCOMTR-MCNC: 141 MG/DL — HIGH (ref 70–99)
GLUCOSE BLDC GLUCOMTR-MCNC: 162 MG/DL — HIGH (ref 70–99)
GLUCOSE SERPL-MCNC: 110 MG/DL — HIGH (ref 70–99)
HCT VFR BLD CALC: 27.7 % — LOW (ref 39–50)
HGB BLD-MCNC: 8.6 G/DL — LOW (ref 13–17)
MAGNESIUM SERPL-MCNC: 2.8 MG/DL — HIGH (ref 1.6–2.6)
MCHC RBC-ENTMCNC: 24.2 PG — LOW (ref 27–34)
MCHC RBC-ENTMCNC: 31 GM/DL — LOW (ref 32–36)
MCV RBC AUTO: 78 FL — LOW (ref 80–100)
PLATELET # BLD AUTO: 200 K/UL — SIGNIFICANT CHANGE UP (ref 150–400)
POTASSIUM SERPL-MCNC: 4.7 MMOL/L — SIGNIFICANT CHANGE UP (ref 3.5–5.3)
POTASSIUM SERPL-SCNC: 4.7 MMOL/L — SIGNIFICANT CHANGE UP (ref 3.5–5.3)
RBC # BLD: 3.55 M/UL — LOW (ref 4.2–5.8)
RBC # FLD: 16.1 % — HIGH (ref 10.3–14.5)
SODIUM SERPL-SCNC: 137 MMOL/L — SIGNIFICANT CHANGE UP (ref 135–145)
TSH RECEP AB FLD-ACNC: <1.1 IU/L — SIGNIFICANT CHANGE UP (ref 0–1.75)
WBC # BLD: 16.79 K/UL — HIGH (ref 3.8–10.5)
WBC # FLD AUTO: 16.79 K/UL — HIGH (ref 3.8–10.5)

## 2023-06-11 PROCEDURE — 99223 1ST HOSP IP/OBS HIGH 75: CPT

## 2023-06-11 PROCEDURE — 99024 POSTOP FOLLOW-UP VISIT: CPT

## 2023-06-11 PROCEDURE — 93010 ELECTROCARDIOGRAM REPORT: CPT | Mod: 76

## 2023-06-11 PROCEDURE — G0316 PROLONG INPT EVAL ADD15 M: CPT

## 2023-06-11 PROCEDURE — 71045 X-RAY EXAM CHEST 1 VIEW: CPT | Mod: 26

## 2023-06-11 PROCEDURE — 99221 1ST HOSP IP/OBS SF/LOW 40: CPT

## 2023-06-11 RX ORDER — METOPROLOL TARTRATE 50 MG
100 TABLET ORAL
Refills: 0 | Status: DISCONTINUED | OUTPATIENT
Start: 2023-06-11 | End: 2023-06-11

## 2023-06-11 RX ORDER — AMIODARONE HYDROCHLORIDE 400 MG/1
200 TABLET ORAL DAILY
Refills: 0 | Status: DISCONTINUED | OUTPATIENT
Start: 2023-06-11 | End: 2023-06-14

## 2023-06-11 RX ORDER — SODIUM CHLORIDE 9 MG/ML
1000 INJECTION, SOLUTION INTRAVENOUS
Refills: 0 | Status: DISCONTINUED | OUTPATIENT
Start: 2023-06-11 | End: 2023-06-12

## 2023-06-11 RX ORDER — METOPROLOL TARTRATE 50 MG
5 TABLET ORAL ONCE
Refills: 0 | Status: COMPLETED | OUTPATIENT
Start: 2023-06-11 | End: 2023-06-11

## 2023-06-11 RX ORDER — DIGOXIN 250 MCG
250 TABLET ORAL ONCE
Refills: 0 | Status: COMPLETED | OUTPATIENT
Start: 2023-06-12 | End: 2023-06-12

## 2023-06-11 RX ORDER — INSULIN LISPRO 100/ML
VIAL (ML) SUBCUTANEOUS
Refills: 0 | Status: DISCONTINUED | OUTPATIENT
Start: 2023-06-11 | End: 2023-06-12

## 2023-06-11 RX ORDER — DEXTROSE 50 % IN WATER 50 %
12.5 SYRINGE (ML) INTRAVENOUS ONCE
Refills: 0 | Status: DISCONTINUED | OUTPATIENT
Start: 2023-06-11 | End: 2023-06-12

## 2023-06-11 RX ORDER — DIGOXIN 250 MCG
250 TABLET ORAL ONCE
Refills: 0 | Status: DISCONTINUED | OUTPATIENT
Start: 2023-06-11 | End: 2023-06-11

## 2023-06-11 RX ORDER — INSULIN LISPRO 100/ML
VIAL (ML) SUBCUTANEOUS AT BEDTIME
Refills: 0 | Status: DISCONTINUED | OUTPATIENT
Start: 2023-06-11 | End: 2023-06-12

## 2023-06-11 RX ORDER — METOPROLOL TARTRATE 50 MG
100 TABLET ORAL
Refills: 0 | Status: DISCONTINUED | OUTPATIENT
Start: 2023-06-11 | End: 2023-06-13

## 2023-06-11 RX ORDER — DEXTROSE 50 % IN WATER 50 %
25 SYRINGE (ML) INTRAVENOUS ONCE
Refills: 0 | Status: DISCONTINUED | OUTPATIENT
Start: 2023-06-11 | End: 2023-06-12

## 2023-06-11 RX ORDER — GLUCAGON INJECTION, SOLUTION 0.5 MG/.1ML
1 INJECTION, SOLUTION SUBCUTANEOUS ONCE
Refills: 0 | Status: DISCONTINUED | OUTPATIENT
Start: 2023-06-11 | End: 2023-06-16

## 2023-06-11 RX ORDER — DIGOXIN 250 MCG
500 TABLET ORAL ONCE
Refills: 0 | Status: COMPLETED | OUTPATIENT
Start: 2023-06-11 | End: 2023-06-11

## 2023-06-11 RX ORDER — DEXTROSE 50 % IN WATER 50 %
15 SYRINGE (ML) INTRAVENOUS ONCE
Refills: 0 | Status: DISCONTINUED | OUTPATIENT
Start: 2023-06-11 | End: 2023-06-12

## 2023-06-11 RX ADMIN — Medication 5 MILLIGRAM(S): at 15:16

## 2023-06-11 RX ADMIN — OXYCODONE HYDROCHLORIDE 10 MILLIGRAM(S): 5 TABLET ORAL at 17:09

## 2023-06-11 RX ADMIN — OXYCODONE HYDROCHLORIDE 10 MILLIGRAM(S): 5 TABLET ORAL at 22:02

## 2023-06-11 RX ADMIN — METHOCARBAMOL 500 MILLIGRAM(S): 500 TABLET, FILM COATED ORAL at 13:22

## 2023-06-11 RX ADMIN — OXYCODONE HYDROCHLORIDE 10 MILLIGRAM(S): 5 TABLET ORAL at 04:32

## 2023-06-11 RX ADMIN — Medication 300 MILLIGRAM(S): at 09:13

## 2023-06-11 RX ADMIN — POLYETHYLENE GLYCOL 3350 17 GRAM(S): 17 POWDER, FOR SOLUTION ORAL at 12:44

## 2023-06-11 RX ADMIN — AMIODARONE HYDROCHLORIDE 200 MILLIGRAM(S): 400 TABLET ORAL at 09:14

## 2023-06-11 RX ADMIN — OXYCODONE HYDROCHLORIDE 10 MILLIGRAM(S): 5 TABLET ORAL at 10:10

## 2023-06-11 RX ADMIN — Medication 100 MILLIGRAM(S): at 17:18

## 2023-06-11 RX ADMIN — Medication 100 MILLIGRAM(S): at 05:08

## 2023-06-11 RX ADMIN — METHOCARBAMOL 500 MILLIGRAM(S): 500 TABLET, FILM COATED ORAL at 17:18

## 2023-06-11 RX ADMIN — OXYCODONE HYDROCHLORIDE 10 MILLIGRAM(S): 5 TABLET ORAL at 21:18

## 2023-06-11 RX ADMIN — SENNA PLUS 2 TABLET(S): 8.6 TABLET ORAL at 21:10

## 2023-06-11 RX ADMIN — METHOCARBAMOL 500 MILLIGRAM(S): 500 TABLET, FILM COATED ORAL at 05:08

## 2023-06-11 RX ADMIN — Medication 100 MILLIGRAM(S): at 14:19

## 2023-06-11 RX ADMIN — Medication 10 MILLIGRAM(S): at 21:10

## 2023-06-11 RX ADMIN — OXYCODONE HYDROCHLORIDE 10 MILLIGRAM(S): 5 TABLET ORAL at 09:13

## 2023-06-11 RX ADMIN — ENOXAPARIN SODIUM 40 MILLIGRAM(S): 100 INJECTION SUBCUTANEOUS at 11:12

## 2023-06-11 RX ADMIN — Medication 81 MILLIGRAM(S): at 12:44

## 2023-06-11 RX ADMIN — METHOCARBAMOL 500 MILLIGRAM(S): 500 TABLET, FILM COATED ORAL at 23:51

## 2023-06-11 RX ADMIN — Medication 10 MILLIGRAM(S): at 05:07

## 2023-06-11 RX ADMIN — OXYCODONE HYDROCHLORIDE 10 MILLIGRAM(S): 5 TABLET ORAL at 04:02

## 2023-06-11 RX ADMIN — GABAPENTIN 300 MILLIGRAM(S): 400 CAPSULE ORAL at 21:10

## 2023-06-11 RX ADMIN — GABAPENTIN 300 MILLIGRAM(S): 400 CAPSULE ORAL at 05:09

## 2023-06-11 RX ADMIN — Medication 50 MILLIGRAM(S): at 03:55

## 2023-06-11 RX ADMIN — PANTOPRAZOLE SODIUM 40 MILLIGRAM(S): 20 TABLET, DELAYED RELEASE ORAL at 12:44

## 2023-06-11 RX ADMIN — CHLORHEXIDINE GLUCONATE 1 APPLICATION(S): 213 SOLUTION TOPICAL at 05:09

## 2023-06-11 RX ADMIN — Medication 500 MICROGRAM(S): at 20:46

## 2023-06-11 RX ADMIN — GABAPENTIN 300 MILLIGRAM(S): 400 CAPSULE ORAL at 14:21

## 2023-06-11 RX ADMIN — Medication 10 MILLIGRAM(S): at 14:21

## 2023-06-11 RX ADMIN — ATORVASTATIN CALCIUM 80 MILLIGRAM(S): 80 TABLET, FILM COATED ORAL at 21:10

## 2023-06-11 NOTE — CONSULT NOTE ADULT - ASSESSMENT
57y  Male with h/o Septic arthritis Rt sip s/p explant space and reimplant, completed 6 weeks of IV antibiotics, Afib, HTN, daily Marijuana smoker, osteoarthritis. Patient presented to [facility] ED on 6/7/23 with c/o mid sternal exertional chest pain described as a burning sensation with associated dyspnea, palpitations, and lightheadedness. He had a LHC on 6/8/23 with Dr. Sanchez which revealed MVD. Pt was admitted on 6/8/2023 for pre-op CABG with Dr. Duke, with plan for OR on 6/9/23. Hospital course: patient is s/p CABGx3, encompass procedure, BHARTI clip 6/9. He has been on Pre and Post antibiotics as per CT surgery protocol. ID input requested to assess need for further antibiotics given his recent septic arthritis.     Rt hip septic arthritis history:  s/p R ZARI by Dr. Ochoa on 8/10/22  s/p I&D of the R ZARI with head and liner exchange by Dr. Ochoa.  9/29/22  surgical tissue cultures were positive for MSSA.  was discharged home on 10/4/22 on Ancef 2 gm IV q 8 hours.  s/p revision R ZARI, with removal of his hip prosthesis and implantation of an antibiotic spacer 10/31/22  s/p revision R ZARI by Chris Ochoa and Franco. Surgical cultures from this procedure were positive for Enterobacter cloacae complex. 11/2022   discharged home on 11 /21/22, on Invanz and Rifampin.   s/p revision R ZARI with removal of his old spacer and reimplantation of a new static spacer. 12/12/22  Surgical cultures from this procedure were positive for Pseudomonas aeruginosa, MDRO.   discharged to [facility] on 12/21/23, on Zosyn 4.5 gm IV q 8 hours which was to be continued for at least 6 weeks.  completed his prescribed antibiotic course  s/p revision right ZARI (with removal of static spacer and reimplantation of hip prosthesis) by Dr. Ochoa on 4/10/23.    s/p CABGx3, encompass procedure, BHARTI clip 6/9/23  Leukocytosis  Recent septic arthritis   Poor dentition      - No need for abx ppx after completion of post op antibiotics  - Patient needs to follow up with a dentist for his poor dental hygiene, otherwise will remain at high risk for infections  - Continue post op care per CT surgery  - Leukocytosis likely reactive  - Trend Fever  - Trend WBC      Thank you for allowing me to participate in the care of your patient.   Will sign off. Please call PRN.     Discussed treatment plan with: CT ICU team

## 2023-06-11 NOTE — CONSULT NOTE ADULT - SUBJECTIVE AND OBJECTIVE BOX
HPI:  Narrative: This is a 56 yo male who had presented to the ED yesterday for c/o Chest discomfort described as a burning sensation mid sternal, dyspnea, palpitations and lightheadedness.  He has a PMHx of OA and within the past year has had a R hip replacement with 6 revisions secondary to persistent infection. He had been on home IV abx via a PICC line for 4 weeks and again for 6 weeks.  He has also been diagnosed with Afib and HTN within the past year.        Symptoms:        Angina (Class): 3       Ischemic Symptoms: Chest pain/burning, dyspnea    Heart Failure:        Systolic/Diastolic/Combined:        NYHA Class (within 2 weeks):     Assessment of LVEF:       EF: 55       Assessed by: TTE       Date: 10/30/22    Prior Cardiac Interventions: None    Noninvasive Testing:   Stress Test: Date: 6/16/22       Protocol: Regadenoson Sestamibi Stress       Duration of Exercise: NA       Symptoms: Nausea       EKG Changes: No significant ST changes       DTS: NA       Myocardial Imaging: Small fixed defect mid inferior wall. No prone imaging. Normal wall motion.  No evidence of reversible ischemia.          Risk Assessment: Low    < from: CT Angio Chest PE Protocol w/ IV Cont (06.07.23 @ 09:31) >  IMPRESSION: No pulmonary embolus is noted.    --- End of Report ---  HIREN BARLOW MD; Attending Radiologist    < end of copied text >    < from: US Duplex Venous Lower Ext Complete, Bilateral (06.07.23 @ 09:42) >  FINDINGS:    RIGHT:  Normal compressibility of the RIGHT common femoral, femoral and popliteal   veins.  Doppler examination shows normal spontaneous and phasic flow.  No RIGHT calf vein thrombosis is detected.    LEFT:  Normal compressibility of the LEFT common femoral, femoral and popliteal   veins.  Doppler examination shows normal spontaneous and phasic flow.  No LEFT calf vein thrombosis is detected.    IMPRESSION:  No evidence of deep venous thrombosis in either lower extremity.            --- End of Report ---  JERRI MOORE MD; Attending Radiologist    < end of copied text >    Echo: 10/30/22  LV EF 55%  Normal LV systolic function  Mild LVH  Mild dilated LA  Dilated RV  mild mitral regurgitation  Mild tricuspid regurgitation  Mildly dilated aortic root    Antianginal Therapies:        Beta Blockers:  Metoprolol Succinate 200 mg daily       Calcium Channel Blockers: Ditiazem HCL  mg daily        Long Acting Nitrates:        Ranexa:     Associated Risk Factors:        Cerebrovascular Disease: N/A       Chronic Lung Disease: N/A       Peripheral Arterial Disease: N/A       Chronic Kidney Disease (if yes, what is GFR): N/A       Uncontrolled Diabetes (if yes, what is HgbA1C or FBS): N/A       Poorly Controlled Hypertension (if yes, what is SBP): Controlled       Morbid Obesity (if yes, what is BMI): 34.3 yes       History of Recent Ventricular Arrhythmia: N/A       Inability to Ambulate Safely: N/A       Need for Therapeutic Anticoagulation: N/A       Antiplatelet or Contrast Allergy: N/A (08 Jun 2023 07:33)      PAST MEDICAL & SURGICAL HISTORY:  HTN (hypertension)      Afib      Osteoarthritis      History of right hip replacement      Obesity, morbid      S/P hip replacement, right          FAMILY HISTORY:  No pertinent family history in first degree relatives          MEDICATIONS  (STANDING):  aMIOdarone    Tablet 200 milliGRAM(s) Oral daily  aspirin enteric coated 81 milliGRAM(s) Oral daily  atorvastatin 80 milliGRAM(s) Oral at bedtime  chlorhexidine 2% Cloths 1 Application(s) Topical <User Schedule>  dextrose 50% Injectable 50 milliLiter(s) IV Push every 15 minutes  dextrose 50% Injectable 25 milliLiter(s) IV Push every 15 minutes  enoxaparin Injectable 40 milliGRAM(s) SubCutaneous every 24 hours  gabapentin 300 milliGRAM(s) Oral every 8 hours  insulin regular Infusion 2 Unit(s)/Hr (2 mL/Hr) IV Continuous <Continuous>  methocarbamol 500 milliGRAM(s) Oral every 6 hours  metoclopramide Injectable 10 milliGRAM(s) IV Push every 8 hours  metoprolol tartrate 50 milliGRAM(s) Oral two times a day  pantoprazole    Tablet 40 milliGRAM(s) Oral daily  polyethylene glycol 3350 17 Gram(s) Oral daily  senna 2 Tablet(s) Oral at bedtime  sodium chloride 0.9%. 1000 milliLiter(s) (10 mL/Hr) IV Continuous <Continuous>  sodium chloride 0.9%. 1000 milliLiter(s) (5 mL/Hr) IV Continuous <Continuous>    MEDICATIONS  (PRN):  oxyCODONE    IR 5 milliGRAM(s) Oral every 4 hours PRN Moderate Pain (4 - 6)  oxyCODONE    IR 10 milliGRAM(s) Oral every 4 hours PRN Severe Pain (7 - 10)      Allergies    No Known Allergies    Intolerances          PHYSICAL EXAM:    Vital Signs Last 24 Hrs  T(C): 36.4 (11 Jun 2023 15:46), Max: 36.9 (11 Jun 2023 00:00)  T(F): 97.6 (11 Jun 2023 15:46), Max: 98.4 (11 Jun 2023 00:00)  HR: 129 (11 Jun 2023 16:00) (59 - 140)  BP: 119/78 (11 Jun 2023 16:00) (105/61 - 166/93)  BP(mean): 94 (11 Jun 2023 16:00) (76 - 118)  RR: 21 (11 Jun 2023 16:00) (12 - 27)  SpO2: 95% (11 Jun 2023 16:00) (91% - 99%)    Parameters below as of 11 Jun 2023 16:00  Patient On (Oxygen Delivery Method): room air        General appearance: Well developed, well nourished.    Eyes: Pupils equal     Neck: Trachea midline. No thyroid enlargement.    Lungs: Normal respiratory excursion. Lungs clear.    CV: Regular cardiac rhythm.     Psych: Normal affect, good judgement.            LABS:                        8.6    16.79 )-----------( 200      ( 11 Jun 2023 02:00 )             27.7     06-11    137  |  104  |  22.3<H>  ----------------------------<  110<H>  4.7   |  24.0  |  0.82    Ca    8.6      11 Jun 2023 02:00  Mg     2.8     06-11    TPro  6.2<L>  /  Alb  3.4  /  TBili  0.2<L>  /  DBili  x   /  AST  46<H>  /  ALT  21  /  AlkPhos  105  06-10      LIVER FUNCTIONS - ( 10 Thanh 2023 02:00 )  Alb: 3.4 g/dL / Pro: 6.2 g/dL / ALK PHOS: 105 U/L / ALT: 21 U/L / AST: 46 U/L / GGT: x                 POCT Blood Glucose.: 104 mg/dL (06-11-23 @ 15:58)  POCT Blood Glucose.: 114 mg/dL (06-11-23 @ 15:20)  POCT Blood Glucose.: 125 mg/dL (06-11-23 @ 14:16)  POCT Blood Glucose.: 126 mg/dL (06-11-23 @ 13:13)  POCT Blood Glucose.: 162 mg/dL (06-11-23 @ 12:34)  POCT Blood Glucose.: 129 mg/dL (06-11-23 @ 11:09)  POCT Blood Glucose.: 110 mg/dL (06-11-23 @ 10:14)  POCT Blood Glucose.: 140 mg/dL (06-11-23 @ 09:09)  POCT Blood Glucose.: 116 mg/dL (06-11-23 @ 08:15)  POCT Blood Glucose.: 120 mg/dL (06-11-23 @ 05:58)  POCT Blood Glucose.: 121 mg/dL (06-11-23 @ 05:02)  POCT Blood Glucose.: 120 mg/dL (06-11-23 @ 03:56)  POCT Blood Glucose.: 106 mg/dL (06-11-23 @ 01:59)  POCT Blood Glucose.: 134 mg/dL (06-11-23 @ 00:15)  POCT Blood Glucose.: 125 mg/dL (06-10-23 @ 23:05)  POCT Blood Glucose.: 130 mg/dL (06-10-23 @ 22:03)  POCT Blood Glucose.: 119 mg/dL (06-10-23 @ 21:16)  POCT Blood Glucose.: 136 mg/dL (06-10-23 @ 20:00)  POCT Blood Glucose.: 154 mg/dL (06-10-23 @ 18:47)  POCT Blood Glucose.: 117 mg/dL (06-10-23 @ 18:12)  POCT Blood Glucose.: 109 mg/dL (06-10-23 @ 17:05)  POCT Blood Glucose.: 116 mg/dL (06-10-23 @ 15:35)  POCT Blood Glucose.: 120 mg/dL (06-10-23 @ 14:06)  POCT Blood Glucose.: 122 mg/dL (06-10-23 @ 13:19)  POCT Blood Glucose.: 158 mg/dL (06-10-23 @ 11:35)  POCT Blood Glucose.: 146 mg/dL (06-10-23 @ 10:01)  POCT Blood Glucose.: 173 mg/dL (06-10-23 @ 09:06)  POCT Blood Glucose.: 125 mg/dL (06-10-23 @ 07:42)  POCT Blood Glucose.: 133 mg/dL (06-10-23 @ 06:45)  POCT Blood Glucose.: 162 mg/dL (06-10-23 @ 06:11)  POCT Blood Glucose.: 111 mg/dL (06-10-23 @ 04:02)  POCT Blood Glucose.: 135 mg/dL (06-10-23 @ 01:59)  POCT Blood Glucose.: 123 mg/dL (06-10-23 @ 01:00)  POCT Blood Glucose.: 144 mg/dL (06-10-23 @ 00:02)  POCT Blood Glucose.: 157 mg/dL (06-09-23 @ 22:57)  POCT Blood Glucose.: 178 mg/dL (06-09-23 @ 21:58)  POCT Blood Glucose.: 233 mg/dL (06-09-23 @ 20:50)  POCT Blood Glucose.: 233 mg/dL (06-09-23 @ 19:58)  POCT Blood Glucose.: 236 mg/dL (06-09-23 @ 18:54)  POCT Blood Glucose.: 239 mg/dL (06-09-23 @ 17:53)  POCT Blood Glucose.: 240 mg/dL (06-09-23 @ 16:43)  POCT Blood Glucose.: 242 mg/dL (06-09-23 @ 15:24)

## 2023-06-11 NOTE — CONSULT NOTE ADULT - SUBJECTIVE AND OBJECTIVE BOX
[facility]  INFECTIOUS DISEASES at [place] and [place]  =======================================================                               Claudio Gaspar MD#   Johanna Montiel MD*                             Navya Dent MD*   Sandra Milian MD*            Diplomates American Board of Internal Medicine & Infectious Diseases                # [place] Office - Appt - Tel  [phone] Fax [phone]                * [place] Office - Appt - Tel [phone] Fax [phone]                                  Hospital Consult line:  [phone]  =======================================================      [id number]  ARSLAN FAMILIA    CC: Patient is a 57y old  Male who presents with a chief complaint of Chest pain     (10 Thanh 2023 14:13)      57y  Male with h/o Septic arthritis Rt sip s/p explant space and reimplant, completed 6 weeks of IV antibiotics, Afib, HTN, daily Marijuana smoker, osteoarthritis. Patient presented to [facility] ED on 6/7/23 with c/o mid sternal exertional chest pain described as a burning sensation with associated dyspnea, palpitations, and lightheadedness. He had a LHC on 6/8/23 with Dr. Sanchez which revealed MVD. Pt was admitted on 6/8/2023 for pre-op CABG with Dr. Duke, with plan for OR on 6/9/23. Hospital course: patient is s/p CABGx3, encompass procedure, BHARTI clip 6/9. He has been on Pre and Post antibiotics as per CT surgery protocol. ID input requested to assess need for further antibiotics given his recent septic arthritis.       Past Medical & Surgical Hx:  HTN (hypertension)  Afib  Osteoarthritis  History of right hip replacement  Obesity, morbid  S/P hip replacement, right      Social Hx:  daily Marijuana smoker      FAMILY HISTORY:  Patient does not recall medical problems his mother or father had.       Allergies  No Known Allergies       REVIEW OF SYSTEMS:  CONSTITUTIONAL:  No Fever or chills  HEENT:  No diplopia or blurred vision.  No earache, sore throat or runny nose.  CARDIOVASCULAR:  No chest pain  RESPIRATORY:  No cough, shortness of breath  GASTROINTESTINAL:  No nausea, vomiting or diarrhea.  GENITOURINARY:  No dysuria, frequency or urgency. No Blood in urine  MUSCULOSKELETAL:  no joint aches, no muscle pain  SKIN:  No change in skin, hair or nails.  NEUROLOGIC:  No Headaches, seizures      Physical Exam:  GEN: NAD  HEENT: normocephalic and atraumatic. EOMI. PERRL.    NECK: Supple.   LUNGS: CTA B/L.  HEART: RRR  ABDOMEN: Soft, NT, ND.  +BS.    : No CVA tenderness  EXTREMITIES: Without  edema.  MSK: No joint swelling  NEUROLOGIC: No Focal Deficits   PSYCHIATRIC: Appropriate affect .  SKIN: surgical site with surgical bandages.       Height (cm): 185.4 (06-11 @ 01:08)  Weight (kg): 119.3 (06-11 @ 01:08)  BMI (kg/m2): 34.7 (06-11 @ 01:08)  BSA (m2): 2.42 (06-11 @ 01:08)      Vitals:  T(F): 97.6 (11 Jun 2023 07:01), Max: 98.4 (11 Jun 2023 00:00)  HR: 98 (11 Jun 2023 12:00)  BP: 138/80 (11 Jun 2023 12:00)  RR: 27 (11 Jun 2023 12:00)  SpO2: 93% (11 Jun 2023 12:00) (91% - 99%)  temp max in last 48H T(F): , Max: 99 (06-09-23 @ 22:00)      Current Antibiotics:  cefuroxime  IVPB 1500 milliGRAM(s) IV Intermittent every 8 hours  cefuroxime  IVPB        Other medications:  aMIOdarone    Tablet 200 milliGRAM(s) Oral daily  aspirin enteric coated 81 milliGRAM(s) Oral daily  atorvastatin 80 milliGRAM(s) Oral at bedtime  chlorhexidine 2% Cloths 1 Application(s) Topical <User Schedule>  dextrose 50% Injectable 50 milliLiter(s) IV Push every 15 minutes  dextrose 50% Injectable 25 milliLiter(s) IV Push every 15 minutes  enoxaparin Injectable 40 milliGRAM(s) SubCutaneous every 24 hours  gabapentin 300 milliGRAM(s) Oral every 8 hours  insulin regular Infusion 2 Unit(s)/Hr IV Continuous <Continuous>  methocarbamol 500 milliGRAM(s) Oral every 6 hours  metoclopramide Injectable 10 milliGRAM(s) IV Push every 8 hours  metoprolol tartrate 50 milliGRAM(s) Oral two times a day  pantoprazole    Tablet 40 milliGRAM(s) Oral daily  polyethylene glycol 3350 17 Gram(s) Oral daily  senna 2 Tablet(s) Oral at bedtime  sodium chloride 0.9%. 1000 milliLiter(s) IV Continuous <Continuous>  sodium chloride 0.9%. 1000 milliLiter(s) IV Continuous <Continuous>                 8.6    16.79 )-----------( 200      ( 11 Jun 2023 02:00 )             27.7     06-11    137  |  104  |  22.3<H>  ----------------------------<  110<H>  4.7   |  24.0  |  0.82    Ca    8.6      11 Jun 2023 02:00  Mg     2.8     06-11    TPro  6.2<L>  /  Alb  3.4  /  TBili  0.2<L>  /  DBili  x   /  AST  46<H>  /  ALT  21  /  AlkPhos  105  06-10    RECENT CULTURES:      WBC Count: 16.79 K/uL (06-11-23 @ 02:00)  WBC Count: 10.27 K/uL (06-10-23 @ 02:00)  WBC Count: 15.75 K/uL (06-09-23 @ 13:50)  WBC Count: 4.22 K/uL (06-09-23 @ 02:15)  WBC Count: 2.85 K/uL (06-08-23 @ 14:39)  WBC Count: 5.71 K/uL (06-07-23 @ 07:57)    Creatinine, Serum: 0.82 mg/dL (06-11-23 @ 02:00)  Creatinine, Serum: 0.90 mg/dL (06-10-23 @ 19:00)  Creatinine, Serum: 0.93 mg/dL (06-10-23 @ 02:00)  Creatinine, Serum: 0.90 mg/dL (06-09-23 @ 13:50)  Creatinine, Serum: 0.85 mg/dL (06-09-23 @ 02:15)  Creatinine, Serum: 0.94 mg/dL (06-08-23 @ 14:39)  Creatinine, Serum: 1.00 mg/dL (06-07-23 @ 07:57)

## 2023-06-11 NOTE — CONSULT NOTE ADULT - ASSESSMENT
HPI and Impression:    Pt. seen post-op CABG, and is on an insulin drip as per protocol. Consult requested due to high IV insulin requirement; pt. was up to 5 units/hour, although now being reduced successfully. No prior h/o of diabetes, although pt. is obese and has a high carb diet.  A1C 5.5.    Suspect that high insulin requirements are due to underlying insulin resistance plus post-op stress, which should diminish rapidly. I do not think pt. needs to be converted to a standing insulin regimen, just observation on correction doses PRN once the insulin drip is discontinued.

## 2023-06-11 NOTE — PROGRESS NOTE ADULT - ASSESSMENT
57yMale daily Marijuana smoker with PMHx Afib, HTN, osteoarthritis, R hip replacement which subsequently required 6 revisions received IV ABX via PICC line completed approx 3/2023), presented to [facility] ED on 6/7/23 with unstable angina. Pt had a LHC on 6/8/23 with Dr. Sanchez which revealed MVD. Admitted on 6/8/2023 for pre-op CABG with Dr. Duke. Underwent CABGx3, encompass procedure, BHARTI clip on 6/9.

## 2023-06-12 LAB
ANION GAP SERPL CALC-SCNC: 10 MMOL/L — SIGNIFICANT CHANGE UP (ref 5–17)
BUN SERPL-MCNC: 22.8 MG/DL — HIGH (ref 8–20)
CALCIUM SERPL-MCNC: 8.3 MG/DL — LOW (ref 8.4–10.5)
CHLORIDE SERPL-SCNC: 102 MMOL/L — SIGNIFICANT CHANGE UP (ref 96–108)
CO2 SERPL-SCNC: 26 MMOL/L — SIGNIFICANT CHANGE UP (ref 22–29)
CREAT SERPL-MCNC: 0.97 MG/DL — SIGNIFICANT CHANGE UP (ref 0.5–1.3)
DIGOXIN SERPL-MCNC: 0.5 NG/ML — LOW (ref 0.8–2)
EGFR: 92 ML/MIN/1.73M2 — SIGNIFICANT CHANGE UP
GLUCOSE BLDC GLUCOMTR-MCNC: 107 MG/DL — HIGH (ref 70–99)
GLUCOSE BLDC GLUCOMTR-MCNC: 119 MG/DL — HIGH (ref 70–99)
GLUCOSE BLDC GLUCOMTR-MCNC: 123 MG/DL — HIGH (ref 70–99)
GLUCOSE BLDC GLUCOMTR-MCNC: 89 MG/DL — SIGNIFICANT CHANGE UP (ref 70–99)
GLUCOSE SERPL-MCNC: 93 MG/DL — SIGNIFICANT CHANGE UP (ref 70–99)
HCT VFR BLD CALC: 27 % — LOW (ref 39–50)
HGB BLD-MCNC: 8.4 G/DL — LOW (ref 13–17)
MAGNESIUM SERPL-MCNC: 2.1 MG/DL — SIGNIFICANT CHANGE UP (ref 1.6–2.6)
MCHC RBC-ENTMCNC: 24.3 PG — LOW (ref 27–34)
MCHC RBC-ENTMCNC: 31.1 GM/DL — LOW (ref 32–36)
MCV RBC AUTO: 78 FL — LOW (ref 80–100)
PLATELET # BLD AUTO: 200 K/UL — SIGNIFICANT CHANGE UP (ref 150–400)
POTASSIUM SERPL-MCNC: 4 MMOL/L — SIGNIFICANT CHANGE UP (ref 3.5–5.3)
POTASSIUM SERPL-SCNC: 4 MMOL/L — SIGNIFICANT CHANGE UP (ref 3.5–5.3)
RBC # BLD: 3.46 M/UL — LOW (ref 4.2–5.8)
RBC # FLD: 16.4 % — HIGH (ref 10.3–14.5)
SODIUM SERPL-SCNC: 138 MMOL/L — SIGNIFICANT CHANGE UP (ref 135–145)
WBC # BLD: 13.86 K/UL — HIGH (ref 3.8–10.5)
WBC # FLD AUTO: 13.86 K/UL — HIGH (ref 3.8–10.5)

## 2023-06-12 PROCEDURE — 93010 ELECTROCARDIOGRAM REPORT: CPT | Mod: 76

## 2023-06-12 PROCEDURE — 99232 SBSQ HOSP IP/OBS MODERATE 35: CPT

## 2023-06-12 PROCEDURE — 71045 X-RAY EXAM CHEST 1 VIEW: CPT | Mod: 26,76

## 2023-06-12 PROCEDURE — 99233 SBSQ HOSP IP/OBS HIGH 50: CPT

## 2023-06-12 RX ORDER — METHOCARBAMOL 500 MG/1
500 TABLET, FILM COATED ORAL THREE TIMES A DAY
Refills: 0 | Status: DISCONTINUED | OUTPATIENT
Start: 2023-06-12 | End: 2023-06-16

## 2023-06-12 RX ORDER — SODIUM CHLORIDE 9 MG/ML
3 INJECTION INTRAMUSCULAR; INTRAVENOUS; SUBCUTANEOUS EVERY 8 HOURS
Refills: 0 | Status: DISCONTINUED | OUTPATIENT
Start: 2023-06-12 | End: 2023-06-16

## 2023-06-12 RX ORDER — ACETAMINOPHEN 500 MG
975 TABLET ORAL EVERY 6 HOURS
Refills: 0 | Status: DISCONTINUED | OUTPATIENT
Start: 2023-06-12 | End: 2023-06-16

## 2023-06-12 RX ORDER — DIGOXIN 250 MCG
125 TABLET ORAL EVERY OTHER DAY
Refills: 0 | Status: DISCONTINUED | OUTPATIENT
Start: 2023-06-14 | End: 2023-06-14

## 2023-06-12 RX ORDER — POTASSIUM CHLORIDE 20 MEQ
20 PACKET (EA) ORAL DAILY
Refills: 0 | Status: DISCONTINUED | OUTPATIENT
Start: 2023-06-12 | End: 2023-06-13

## 2023-06-12 RX ORDER — FUROSEMIDE 40 MG
40 TABLET ORAL DAILY
Refills: 0 | Status: COMPLETED | OUTPATIENT
Start: 2023-06-12 | End: 2023-06-14

## 2023-06-12 RX ADMIN — Medication 975 MILLIGRAM(S): at 11:45

## 2023-06-12 RX ADMIN — OXYCODONE HYDROCHLORIDE 10 MILLIGRAM(S): 5 TABLET ORAL at 21:10

## 2023-06-12 RX ADMIN — AMIODARONE HYDROCHLORIDE 200 MILLIGRAM(S): 400 TABLET ORAL at 05:35

## 2023-06-12 RX ADMIN — GABAPENTIN 300 MILLIGRAM(S): 400 CAPSULE ORAL at 13:29

## 2023-06-12 RX ADMIN — GABAPENTIN 300 MILLIGRAM(S): 400 CAPSULE ORAL at 21:09

## 2023-06-12 RX ADMIN — CHLORHEXIDINE GLUCONATE 1 APPLICATION(S): 213 SOLUTION TOPICAL at 05:45

## 2023-06-12 RX ADMIN — Medication 250 MICROGRAM(S): at 13:29

## 2023-06-12 RX ADMIN — Medication 250 MICROGRAM(S): at 05:48

## 2023-06-12 RX ADMIN — POLYETHYLENE GLYCOL 3350 17 GRAM(S): 17 POWDER, FOR SOLUTION ORAL at 11:14

## 2023-06-12 RX ADMIN — METHOCARBAMOL 500 MILLIGRAM(S): 500 TABLET, FILM COATED ORAL at 11:15

## 2023-06-12 RX ADMIN — PANTOPRAZOLE SODIUM 40 MILLIGRAM(S): 20 TABLET, DELAYED RELEASE ORAL at 11:15

## 2023-06-12 RX ADMIN — OXYCODONE HYDROCHLORIDE 10 MILLIGRAM(S): 5 TABLET ORAL at 03:56

## 2023-06-12 RX ADMIN — Medication 20 MILLIEQUIVALENT(S): at 11:15

## 2023-06-12 RX ADMIN — Medication 40 MILLIGRAM(S): at 11:24

## 2023-06-12 RX ADMIN — OXYCODONE HYDROCHLORIDE 10 MILLIGRAM(S): 5 TABLET ORAL at 21:50

## 2023-06-12 RX ADMIN — OXYCODONE HYDROCHLORIDE 10 MILLIGRAM(S): 5 TABLET ORAL at 08:32

## 2023-06-12 RX ADMIN — OXYCODONE HYDROCHLORIDE 10 MILLIGRAM(S): 5 TABLET ORAL at 14:00

## 2023-06-12 RX ADMIN — METHOCARBAMOL 500 MILLIGRAM(S): 500 TABLET, FILM COATED ORAL at 05:35

## 2023-06-12 RX ADMIN — Medication 100 MILLIGRAM(S): at 05:35

## 2023-06-12 RX ADMIN — OXYCODONE HYDROCHLORIDE 10 MILLIGRAM(S): 5 TABLET ORAL at 13:30

## 2023-06-12 RX ADMIN — GABAPENTIN 300 MILLIGRAM(S): 400 CAPSULE ORAL at 05:35

## 2023-06-12 RX ADMIN — Medication 81 MILLIGRAM(S): at 11:15

## 2023-06-12 RX ADMIN — SENNA PLUS 2 TABLET(S): 8.6 TABLET ORAL at 21:10

## 2023-06-12 RX ADMIN — OXYCODONE HYDROCHLORIDE 10 MILLIGRAM(S): 5 TABLET ORAL at 05:00

## 2023-06-12 RX ADMIN — Medication 975 MILLIGRAM(S): at 17:51

## 2023-06-12 RX ADMIN — OXYCODONE HYDROCHLORIDE 10 MILLIGRAM(S): 5 TABLET ORAL at 09:32

## 2023-06-12 RX ADMIN — SODIUM CHLORIDE 3 MILLILITER(S): 9 INJECTION INTRAMUSCULAR; INTRAVENOUS; SUBCUTANEOUS at 22:20

## 2023-06-12 RX ADMIN — SODIUM CHLORIDE 3 MILLILITER(S): 9 INJECTION INTRAMUSCULAR; INTRAVENOUS; SUBCUTANEOUS at 12:13

## 2023-06-12 RX ADMIN — Medication 975 MILLIGRAM(S): at 11:15

## 2023-06-12 RX ADMIN — METHOCARBAMOL 500 MILLIGRAM(S): 500 TABLET, FILM COATED ORAL at 21:10

## 2023-06-12 RX ADMIN — ENOXAPARIN SODIUM 40 MILLIGRAM(S): 100 INJECTION SUBCUTANEOUS at 11:15

## 2023-06-12 RX ADMIN — Medication 975 MILLIGRAM(S): at 17:21

## 2023-06-12 RX ADMIN — ATORVASTATIN CALCIUM 80 MILLIGRAM(S): 80 TABLET, FILM COATED ORAL at 21:09

## 2023-06-12 NOTE — PROGRESS NOTE ADULT - ASSESSMENT
57 M with PMHx Afib, HTN, osteoarthritis, R hip replacement, presented to [facility] ED on 6/7/23 with unstable angina. Pt had a LHC on 6/8/23 with Dr. Sanchez which revealed MVD. Admitted on 6/8/2023 for pre-op CABG with Dr. Duke. Underwent CABGx3, encompass procedure, BHARTI clip on 6/9.    1. Stress hyperglycemia post op - now resolved  - No hx DM, a1c 5.5%  - On sliding scale coverage, glucoses controlled  - Continue to monitor    Will sign off  If any further questions, please call  Thank you

## 2023-06-12 NOTE — PROGRESS NOTE ADULT - ASSESSMENT
57yMale daily Marijuana smoker with PMHx Afib, HTN, osteoarthritis, R hip replacement which subsequently required 6 revisions received IV ABX via PICC line completed approx 3/2023), presented to [facility] ED on 6/7/23 with unstable angina. Pt had a LHC on 6/8/23 with Dr. Sanchez which revealed MVD. Admitted on 6/8/2023 for pre-op CABG with Dr. Duke. Underwent CABGx3, encompass procedure, BHARTI clip on 6/9.

## 2023-06-13 LAB
ANION GAP SERPL CALC-SCNC: 12 MMOL/L — SIGNIFICANT CHANGE UP (ref 5–17)
ANION GAP SERPL CALC-SCNC: 13 MMOL/L — SIGNIFICANT CHANGE UP (ref 5–17)
BUN SERPL-MCNC: 23.4 MG/DL — HIGH (ref 8–20)
BUN SERPL-MCNC: 24.3 MG/DL — HIGH (ref 8–20)
CALCIUM SERPL-MCNC: 8.7 MG/DL — SIGNIFICANT CHANGE UP (ref 8.4–10.5)
CALCIUM SERPL-MCNC: 9.2 MG/DL — SIGNIFICANT CHANGE UP (ref 8.4–10.5)
CHLORIDE SERPL-SCNC: 98 MMOL/L — SIGNIFICANT CHANGE UP (ref 96–108)
CHLORIDE SERPL-SCNC: 99 MMOL/L — SIGNIFICANT CHANGE UP (ref 96–108)
CK MB CFR SERPL CALC: 2.7 NG/ML — SIGNIFICANT CHANGE UP (ref 0–6.7)
CK SERPL-CCNC: 342 U/L — HIGH (ref 30–200)
CO2 SERPL-SCNC: 25 MMOL/L — SIGNIFICANT CHANGE UP (ref 22–29)
CO2 SERPL-SCNC: 26 MMOL/L — SIGNIFICANT CHANGE UP (ref 22–29)
CREAT SERPL-MCNC: 0.88 MG/DL — SIGNIFICANT CHANGE UP (ref 0.5–1.3)
CREAT SERPL-MCNC: 0.93 MG/DL — SIGNIFICANT CHANGE UP (ref 0.5–1.3)
EGFR: 102 ML/MIN/1.73M2 — SIGNIFICANT CHANGE UP
EGFR: 97 ML/MIN/1.73M2 — SIGNIFICANT CHANGE UP
GLUCOSE SERPL-MCNC: 109 MG/DL — HIGH (ref 70–99)
GLUCOSE SERPL-MCNC: 92 MG/DL — SIGNIFICANT CHANGE UP (ref 70–99)
HCT VFR BLD CALC: 31.4 % — LOW (ref 39–50)
HGB BLD-MCNC: 9.8 G/DL — LOW (ref 13–17)
MAGNESIUM SERPL-MCNC: 2 MG/DL — SIGNIFICANT CHANGE UP (ref 1.6–2.6)
MAGNESIUM SERPL-MCNC: 2.4 MG/DL — SIGNIFICANT CHANGE UP (ref 1.8–2.6)
MCHC RBC-ENTMCNC: 24.3 PG — LOW (ref 27–34)
MCHC RBC-ENTMCNC: 31.2 GM/DL — LOW (ref 32–36)
MCV RBC AUTO: 77.9 FL — LOW (ref 80–100)
PLATELET # BLD AUTO: 253 K/UL — SIGNIFICANT CHANGE UP (ref 150–400)
POTASSIUM SERPL-MCNC: 3.5 MMOL/L — SIGNIFICANT CHANGE UP (ref 3.5–5.3)
POTASSIUM SERPL-MCNC: 4 MMOL/L — SIGNIFICANT CHANGE UP (ref 3.5–5.3)
POTASSIUM SERPL-SCNC: 3.5 MMOL/L — SIGNIFICANT CHANGE UP (ref 3.5–5.3)
POTASSIUM SERPL-SCNC: 4 MMOL/L — SIGNIFICANT CHANGE UP (ref 3.5–5.3)
RBC # BLD: 4.03 M/UL — LOW (ref 4.2–5.8)
RBC # FLD: 15.9 % — HIGH (ref 10.3–14.5)
SODIUM SERPL-SCNC: 136 MMOL/L — SIGNIFICANT CHANGE UP (ref 135–145)
SODIUM SERPL-SCNC: 137 MMOL/L — SIGNIFICANT CHANGE UP (ref 135–145)
TROPONIN T SERPL-MCNC: 0.22 NG/ML — HIGH (ref 0–0.06)
WBC # BLD: 11.32 K/UL — HIGH (ref 3.8–10.5)
WBC # FLD AUTO: 11.32 K/UL — HIGH (ref 3.8–10.5)

## 2023-06-13 PROCEDURE — 71045 X-RAY EXAM CHEST 1 VIEW: CPT | Mod: 26

## 2023-06-13 PROCEDURE — 99024 POSTOP FOLLOW-UP VISIT: CPT

## 2023-06-13 PROCEDURE — 93010 ELECTROCARDIOGRAM REPORT: CPT

## 2023-06-13 PROCEDURE — 99233 SBSQ HOSP IP/OBS HIGH 50: CPT

## 2023-06-13 RX ORDER — METOPROLOL TARTRATE 50 MG
25 TABLET ORAL EVERY 6 HOURS
Refills: 0 | Status: COMPLETED | OUTPATIENT
Start: 2023-06-13 | End: 2023-06-16

## 2023-06-13 RX ORDER — POTASSIUM CHLORIDE 20 MEQ
40 PACKET (EA) ORAL EVERY 4 HOURS
Refills: 0 | Status: COMPLETED | OUTPATIENT
Start: 2023-06-13 | End: 2023-06-13

## 2023-06-13 RX ORDER — GABAPENTIN 400 MG/1
300 CAPSULE ORAL THREE TIMES A DAY
Refills: 0 | Status: COMPLETED | OUTPATIENT
Start: 2023-06-13 | End: 2023-06-15

## 2023-06-13 RX ORDER — HYDROMORPHONE HYDROCHLORIDE 2 MG/ML
0.5 INJECTION INTRAMUSCULAR; INTRAVENOUS; SUBCUTANEOUS ONCE
Refills: 0 | Status: DISCONTINUED | OUTPATIENT
Start: 2023-06-13 | End: 2023-06-13

## 2023-06-13 RX ORDER — SORBITOL SOLUTION 70 %
30 SOLUTION, ORAL MISCELLANEOUS ONCE
Refills: 0 | Status: COMPLETED | OUTPATIENT
Start: 2023-06-14 | End: 2023-06-14

## 2023-06-13 RX ORDER — MAGNESIUM SULFATE 500 MG/ML
2 VIAL (ML) INJECTION ONCE
Refills: 0 | Status: COMPLETED | OUTPATIENT
Start: 2023-06-13 | End: 2023-06-13

## 2023-06-13 RX ORDER — APIXABAN 2.5 MG/1
2.5 TABLET, FILM COATED ORAL EVERY 12 HOURS
Refills: 0 | Status: DISCONTINUED | OUTPATIENT
Start: 2023-06-13 | End: 2023-06-14

## 2023-06-13 RX ORDER — LANOLIN ALCOHOL/MO/W.PET/CERES
3 CREAM (GRAM) TOPICAL AT BEDTIME
Refills: 0 | Status: DISCONTINUED | OUTPATIENT
Start: 2023-06-13 | End: 2023-06-16

## 2023-06-13 RX ADMIN — Medication 40 MILLIGRAM(S): at 05:05

## 2023-06-13 RX ADMIN — GABAPENTIN 300 MILLIGRAM(S): 400 CAPSULE ORAL at 14:12

## 2023-06-13 RX ADMIN — Medication 40 MILLIEQUIVALENT(S): at 05:06

## 2023-06-13 RX ADMIN — SODIUM CHLORIDE 3 MILLILITER(S): 9 INJECTION INTRAMUSCULAR; INTRAVENOUS; SUBCUTANEOUS at 21:40

## 2023-06-13 RX ADMIN — Medication 25 MILLIGRAM(S): at 08:57

## 2023-06-13 RX ADMIN — SENNA PLUS 2 TABLET(S): 8.6 TABLET ORAL at 21:45

## 2023-06-13 RX ADMIN — PANTOPRAZOLE SODIUM 40 MILLIGRAM(S): 20 TABLET, DELAYED RELEASE ORAL at 11:25

## 2023-06-13 RX ADMIN — APIXABAN 2.5 MILLIGRAM(S): 2.5 TABLET, FILM COATED ORAL at 17:01

## 2023-06-13 RX ADMIN — Medication 975 MILLIGRAM(S): at 11:54

## 2023-06-13 RX ADMIN — METHOCARBAMOL 500 MILLIGRAM(S): 500 TABLET, FILM COATED ORAL at 21:45

## 2023-06-13 RX ADMIN — Medication 25 MILLIGRAM(S): at 14:13

## 2023-06-13 RX ADMIN — METHOCARBAMOL 500 MILLIGRAM(S): 500 TABLET, FILM COATED ORAL at 08:17

## 2023-06-13 RX ADMIN — Medication 3 MILLIGRAM(S): at 23:49

## 2023-06-13 RX ADMIN — AMIODARONE HYDROCHLORIDE 200 MILLIGRAM(S): 400 TABLET ORAL at 05:05

## 2023-06-13 RX ADMIN — HYDROMORPHONE HYDROCHLORIDE 0.5 MILLIGRAM(S): 2 INJECTION INTRAMUSCULAR; INTRAVENOUS; SUBCUTANEOUS at 01:08

## 2023-06-13 RX ADMIN — Medication 25 MILLIGRAM(S): at 18:56

## 2023-06-13 RX ADMIN — Medication 975 MILLIGRAM(S): at 17:31

## 2023-06-13 RX ADMIN — Medication 81 MILLIGRAM(S): at 11:27

## 2023-06-13 RX ADMIN — Medication 975 MILLIGRAM(S): at 23:49

## 2023-06-13 RX ADMIN — SODIUM CHLORIDE 3 MILLILITER(S): 9 INJECTION INTRAMUSCULAR; INTRAVENOUS; SUBCUTANEOUS at 06:52

## 2023-06-13 RX ADMIN — METHOCARBAMOL 500 MILLIGRAM(S): 500 TABLET, FILM COATED ORAL at 14:12

## 2023-06-13 RX ADMIN — Medication 975 MILLIGRAM(S): at 06:00

## 2023-06-13 RX ADMIN — Medication 25 MILLIGRAM(S): at 23:49

## 2023-06-13 RX ADMIN — ENOXAPARIN SODIUM 40 MILLIGRAM(S): 100 INJECTION SUBCUTANEOUS at 11:24

## 2023-06-13 RX ADMIN — Medication 25 GRAM(S): at 05:06

## 2023-06-13 RX ADMIN — Medication 975 MILLIGRAM(S): at 05:05

## 2023-06-13 RX ADMIN — Medication 40 MILLIEQUIVALENT(S): at 08:16

## 2023-06-13 RX ADMIN — CHLORHEXIDINE GLUCONATE 1 APPLICATION(S): 213 SOLUTION TOPICAL at 06:52

## 2023-06-13 RX ADMIN — ATORVASTATIN CALCIUM 80 MILLIGRAM(S): 80 TABLET, FILM COATED ORAL at 21:45

## 2023-06-13 RX ADMIN — POLYETHYLENE GLYCOL 3350 17 GRAM(S): 17 POWDER, FOR SOLUTION ORAL at 11:25

## 2023-06-13 RX ADMIN — GABAPENTIN 300 MILLIGRAM(S): 400 CAPSULE ORAL at 21:45

## 2023-06-13 RX ADMIN — Medication 975 MILLIGRAM(S): at 11:24

## 2023-06-13 RX ADMIN — Medication 975 MILLIGRAM(S): at 17:01

## 2023-06-13 RX ADMIN — HYDROMORPHONE HYDROCHLORIDE 0.5 MILLIGRAM(S): 2 INJECTION INTRAMUSCULAR; INTRAVENOUS; SUBCUTANEOUS at 01:30

## 2023-06-13 RX ADMIN — SODIUM CHLORIDE 3 MILLILITER(S): 9 INJECTION INTRAMUSCULAR; INTRAVENOUS; SUBCUTANEOUS at 14:18

## 2023-06-13 NOTE — PROGRESS NOTE ADULT - ASSESSMENT
57yMale daily Marijuana smoker with PMHx Afib, HTN, osteoarthritis, R hip replacement which subsequently required 6 revisions received IV ABX via PICC line completed approx 3/2023), presented to [facility] ED on 6/7/23 with unstable angina. Pt had a LHC on 6/8/23 with Dr. Sanchez which revealed MVD. Admitted on 6/8/2023 for pre-op CABG with Dr. Duke. Underwent CABGx3, encompass procedure, BHARTI clip on 6/9. Post-op course c/b afib RVR, lopressor increased and patient dig loaded. Pt remains in afib rate controlled.

## 2023-06-14 ENCOUNTER — APPOINTMENT (OUTPATIENT)
Dept: ELECTROPHYSIOLOGY | Facility: CLINIC | Age: 58
End: 2023-06-14

## 2023-06-14 DIAGNOSIS — Z98.890 OTHER SPECIFIED POSTPROCEDURAL STATES: ICD-10-CM

## 2023-06-14 LAB
ANION GAP SERPL CALC-SCNC: 11 MMOL/L — SIGNIFICANT CHANGE UP (ref 5–17)
BUN SERPL-MCNC: 23.3 MG/DL — HIGH (ref 8–20)
CALCIUM SERPL-MCNC: 8.9 MG/DL — SIGNIFICANT CHANGE UP (ref 8.4–10.5)
CHLORIDE SERPL-SCNC: 102 MMOL/L — SIGNIFICANT CHANGE UP (ref 96–108)
CO2 SERPL-SCNC: 25 MMOL/L — SIGNIFICANT CHANGE UP (ref 22–29)
CREAT SERPL-MCNC: 0.85 MG/DL — SIGNIFICANT CHANGE UP (ref 0.5–1.3)
EGFR: 103 ML/MIN/1.73M2 — SIGNIFICANT CHANGE UP
GLUCOSE SERPL-MCNC: 100 MG/DL — HIGH (ref 70–99)
HCT VFR BLD CALC: 31.8 % — LOW (ref 39–50)
HGB BLD-MCNC: 9.7 G/DL — LOW (ref 13–17)
MAGNESIUM SERPL-MCNC: 2.3 MG/DL — SIGNIFICANT CHANGE UP (ref 1.6–2.6)
MCHC RBC-ENTMCNC: 23.5 PG — LOW (ref 27–34)
MCHC RBC-ENTMCNC: 30.5 GM/DL — LOW (ref 32–36)
MCV RBC AUTO: 77.2 FL — LOW (ref 80–100)
PLATELET # BLD AUTO: 311 K/UL — SIGNIFICANT CHANGE UP (ref 150–400)
POTASSIUM SERPL-MCNC: 4.6 MMOL/L — SIGNIFICANT CHANGE UP (ref 3.5–5.3)
POTASSIUM SERPL-SCNC: 4.6 MMOL/L — SIGNIFICANT CHANGE UP (ref 3.5–5.3)
RBC # BLD: 4.12 M/UL — LOW (ref 4.2–5.8)
RBC # FLD: 15.7 % — HIGH (ref 10.3–14.5)
SODIUM SERPL-SCNC: 138 MMOL/L — SIGNIFICANT CHANGE UP (ref 135–145)
WBC # BLD: 10.12 K/UL — SIGNIFICANT CHANGE UP (ref 3.8–10.5)
WBC # FLD AUTO: 10.12 K/UL — SIGNIFICANT CHANGE UP (ref 3.8–10.5)

## 2023-06-14 PROCEDURE — 99024 POSTOP FOLLOW-UP VISIT: CPT

## 2023-06-14 PROCEDURE — 71045 X-RAY EXAM CHEST 1 VIEW: CPT | Mod: 26

## 2023-06-14 PROCEDURE — 93010 ELECTROCARDIOGRAM REPORT: CPT

## 2023-06-14 PROCEDURE — 99223 1ST HOSP IP/OBS HIGH 75: CPT

## 2023-06-14 RX ORDER — APIXABAN 2.5 MG/1
5 TABLET, FILM COATED ORAL EVERY 12 HOURS
Refills: 0 | Status: DISCONTINUED | OUTPATIENT
Start: 2023-06-14 | End: 2023-06-16

## 2023-06-14 RX ORDER — AMIODARONE HYDROCHLORIDE 400 MG/1
400 TABLET ORAL EVERY 8 HOURS
Refills: 0 | Status: DISCONTINUED | OUTPATIENT
Start: 2023-06-14 | End: 2023-06-16

## 2023-06-14 RX ORDER — AMIODARONE HYDROCHLORIDE 400 MG/1
200 TABLET ORAL DAILY
Refills: 0 | Status: CANCELLED | OUTPATIENT
Start: 2023-06-21 | End: 2023-06-16

## 2023-06-14 RX ORDER — AMIODARONE HYDROCHLORIDE 400 MG/1
TABLET ORAL
Refills: 0 | Status: DISCONTINUED | OUTPATIENT
Start: 2023-06-14 | End: 2023-06-16

## 2023-06-14 RX ADMIN — AMIODARONE HYDROCHLORIDE 400 MILLIGRAM(S): 400 TABLET ORAL at 23:19

## 2023-06-14 RX ADMIN — Medication 25 MILLIGRAM(S): at 23:20

## 2023-06-14 RX ADMIN — Medication 975 MILLIGRAM(S): at 05:19

## 2023-06-14 RX ADMIN — SODIUM CHLORIDE 3 MILLILITER(S): 9 INJECTION INTRAMUSCULAR; INTRAVENOUS; SUBCUTANEOUS at 22:00

## 2023-06-14 RX ADMIN — GABAPENTIN 300 MILLIGRAM(S): 400 CAPSULE ORAL at 23:19

## 2023-06-14 RX ADMIN — METHOCARBAMOL 500 MILLIGRAM(S): 500 TABLET, FILM COATED ORAL at 05:20

## 2023-06-14 RX ADMIN — AMIODARONE HYDROCHLORIDE 200 MILLIGRAM(S): 400 TABLET ORAL at 05:20

## 2023-06-14 RX ADMIN — Medication 975 MILLIGRAM(S): at 17:20

## 2023-06-14 RX ADMIN — Medication 25 MILLIGRAM(S): at 05:20

## 2023-06-14 RX ADMIN — Medication 25 MILLIGRAM(S): at 11:50

## 2023-06-14 RX ADMIN — GABAPENTIN 300 MILLIGRAM(S): 400 CAPSULE ORAL at 13:43

## 2023-06-14 RX ADMIN — CHLORHEXIDINE GLUCONATE 1 APPLICATION(S): 213 SOLUTION TOPICAL at 10:27

## 2023-06-14 RX ADMIN — APIXABAN 5 MILLIGRAM(S): 2.5 TABLET, FILM COATED ORAL at 17:20

## 2023-06-14 RX ADMIN — Medication 3 MILLIGRAM(S): at 23:20

## 2023-06-14 RX ADMIN — SODIUM CHLORIDE 3 MILLILITER(S): 9 INJECTION INTRAMUSCULAR; INTRAVENOUS; SUBCUTANEOUS at 13:36

## 2023-06-14 RX ADMIN — Medication 125 MICROGRAM(S): at 11:50

## 2023-06-14 RX ADMIN — SODIUM CHLORIDE 3 MILLILITER(S): 9 INJECTION INTRAMUSCULAR; INTRAVENOUS; SUBCUTANEOUS at 05:18

## 2023-06-14 RX ADMIN — Medication 25 MILLIGRAM(S): at 17:20

## 2023-06-14 RX ADMIN — Medication 975 MILLIGRAM(S): at 11:49

## 2023-06-14 RX ADMIN — Medication 30 MILLILITER(S): at 07:21

## 2023-06-14 RX ADMIN — METHOCARBAMOL 500 MILLIGRAM(S): 500 TABLET, FILM COATED ORAL at 13:43

## 2023-06-14 RX ADMIN — Medication 975 MILLIGRAM(S): at 00:49

## 2023-06-14 RX ADMIN — PANTOPRAZOLE SODIUM 40 MILLIGRAM(S): 20 TABLET, DELAYED RELEASE ORAL at 11:50

## 2023-06-14 RX ADMIN — Medication 81 MILLIGRAM(S): at 11:50

## 2023-06-14 RX ADMIN — METHOCARBAMOL 500 MILLIGRAM(S): 500 TABLET, FILM COATED ORAL at 23:20

## 2023-06-14 RX ADMIN — SENNA PLUS 2 TABLET(S): 8.6 TABLET ORAL at 23:20

## 2023-06-14 RX ADMIN — APIXABAN 2.5 MILLIGRAM(S): 2.5 TABLET, FILM COATED ORAL at 05:20

## 2023-06-14 RX ADMIN — GABAPENTIN 300 MILLIGRAM(S): 400 CAPSULE ORAL at 05:19

## 2023-06-14 RX ADMIN — Medication 40 MILLIGRAM(S): at 05:20

## 2023-06-14 RX ADMIN — Medication 975 MILLIGRAM(S): at 12:49

## 2023-06-14 RX ADMIN — ATORVASTATIN CALCIUM 80 MILLIGRAM(S): 80 TABLET, FILM COATED ORAL at 23:19

## 2023-06-14 NOTE — CONSULT NOTE ADULT - SUBJECTIVE AND OBJECTIVE BOX
[facility]   [facility] Faculty Practice   Office: [address] [place] [address]   Telephone: [phone]  Fax: [phone]                     Electrophysiology Consult Note                                                                                                                                          Consult requested by: FROILAN Raza  Reason for Consultation: AFib   History obtained by: pt and EMR    obtained: n/a  Community Cardiologist: Dr. Sanchez     HPI:  57 year old male, daily marijuana use, with HTN, osteoarthritis s/p right total hip replacement 8/10/22 c/b septic arthritis requiring multiple follow up surgeries and IV abx therapy, and atrial fibrillation. He presented to [facility] ED on 6/7/23 c/o mid sternal exertional chest pain described as a burning sensation with associated dyspnea, palpitations, and lightheadedness. LHC on 6/8 demonstrated multivessel disease and he was referred to [facility] for further eval. Admitted on 6/8/2023 for pre-op CABG with Dr. Duke and underwent CABGx3 (LIMA-LAD, vein-OM, Diag), Encompass RF ablation and Atriaclip on 6/9. Pt with early recurrent atrial fibrillation for which EP consult is requested.     Telemetry: AFib w/ average rates 90-115bpm. No events noted.      PAST MEDICAL & SURGICAL HISTORY:  HTN (hypertension)  Afib  Osteoarthritis  History of right hip replacement  Obesity, morbid  S/P hip replacement, right    REVIEW OF SYSTEMS:  CONSTITUTIONAL: No fever, weight loss, or fatigue  EYES: No eye pain, visual disturbances, or discharge  ENMT:  No difficulty hearing, tinnitus, vertigo; No sinus or throat pain  NECK: No pain or stiffness  RESPIRATORY: No cough, wheezing, chills or hemoptysis; No shortness of breath  CARDIOVASCULAR: No chest pain, palpitations, dizziness, or leg swelling  GASTROINTESTINAL: No abdominal or epigastric pain. No nausea, vomiting, or hematemesis; No diarrhea or constipation. No melena or hematochezia.  GENITOURINARY: No dysuria, frequency, hematuria, or incontinence  NEUROLOGICAL: No headaches, memory loss, loss of strength, numbness, or tremors  ENDOCRINE: No heat or cold intolerance; No hair loss  MUSCULOSKELETAL: No joint pain or swelling; No muscle, back, or extremity pain  PSYCHIATRIC: No depression, anxiety, mood swings, or difficulty sleeping  HEME/LYMPH: No easy bruising, or bleeding gums  ALLERGY AND IMMUNOLOGIC: No hives or eczema    MEDICATIONS  (STANDING):  acetaminophen     Tablet .. 975 milliGRAM(s) Oral every 6 hours  aMIOdarone    Tablet 200 milliGRAM(s) Oral daily  apixaban 2.5 milliGRAM(s) Oral every 12 hours  aspirin enteric coated 81 milliGRAM(s) Oral daily  atorvastatin 80 milliGRAM(s) Oral at bedtime  chlorhexidine 2% Cloths 1 Application(s) Topical <User Schedule>  digoxin     Tablet 125 MICROGram(s) Oral every other day  gabapentin 300 milliGRAM(s) Oral three times a day  glucagon  Injectable 1 milliGRAM(s) IntraMuscular once  melatonin 3 milliGRAM(s) Oral at bedtime  methocarbamol 500 milliGRAM(s) Oral three times a day  metoprolol tartrate 25 milliGRAM(s) Oral every 6 hours  pantoprazole    Tablet 40 milliGRAM(s) Oral daily  polyethylene glycol 3350 17 Gram(s) Oral daily  senna 2 Tablet(s) Oral at bedtime  sodium chloride 0.9% lock flush 3 milliLiter(s) IV Push every 8 hours    MEDICATIONS  (PRN):    Allergies:  No Known Allergies    SOCIAL HISTORY:    FAMILY HISTORY:  No pertinent family history in first degree relatives    Vital Signs Last 24 Hrs  T(C): 36.5 (14 Jun 2023 08:01), Max: 36.7 (13 Jun 2023 16:04)  T(F): 97.7 (14 Jun 2023 08:01), Max: 98.1 (13 Jun 2023 16:04)  HR: 83 (14 Jun 2023 08:01) (75 - 100)  BP: 107/84 (14 Jun 2023 08:01) (107/84 - 126/84)  BP(mean): 87 (13 Jun 2023 16:00) (87 - 99)  RR: 18 (14 Jun 2023 08:01) (16 - 23)  SpO2: 98% (14 Jun 2023 08:01) (96% - 100%)    Parameters below as of 14 Jun 2023 08:01  Patient On (Oxygen Delivery Method): room air    Physical Exam:  Constitutional: AAOx3, NAD  Neck: supple, No JVD  Cardiovascular: +S1S2 RRR, no murmurs, rubs, gallops   Pulmonary: CTA b/l, unlabored, no wheezes, rales. rhonci  Abdomen: +BS, soft NTND  Extremities: no edema b/l, +distal pulses b/l  Neuro: non focal, speech clear, JARA x 4    LABS:                        9.7    10.12 )-----------( 311      ( 14 Jun 2023 05:10 )             31.8   06-14    138  |  102  |  23.3<H>  ----------------------------<  100<H>  4.6   |  25.0  |  0.85    Ca    8.9      14 Jun 2023 05:10  Mg     2.3     06-14    CARDIAC MARKERS ( 13 Jun 2023 01:13 )  x     / 0.22 ng/mL / 342 U/L / x     / 2.7 ng/mL    Echo: 6/3/23: (results obtained from [employer])  EF 55-60%, hypermobile interatrial septum, aortic root 4.3cm     NST: 6/16/22: (results obtained from [employer])   small fixed defect involving mid inferior wall, normal wall motion on gated study, normal EF     RADIOLOGY & ADDITIONAL STUDIES:  CT angio chest: 6/7/23:   INTERPRETATION:  Clinical information: Positive d-dimer levels. Evaluate for pulmonary embolus.  CT angiogram of the chestwas obtained following administration of intravenous contrast. Approximately 70 cc of Omnipaque 350 was administered and 30 cc was discarded. Coronal, sagittal and MIP images were submitted for review.  No hilar and or mediastinal adenopathy is noted.  Heart is enlarged in size. Calcification of the aortic valve and the coronary arteries is noted. No pericardial effusion is noted. Pulmonary arteries are normal in caliber. No filling defects are noted.  No endobronchial lesions arenoted. Saber-sheath appearance of the trachea is noted. Lungs are clear. No pleural effusions are noted.  Below the diaphragm, visualized portions of the abdomen are unremarkable.  Degenerative changes of the spine are noted.  IMPRESSION: No pulmonary embolus is noted.  --- End of Report ---  HIREN BARLOW MD; Attending Radiologist  This document has been electronically signed. Jun 7 2023  9:49AM    Cardiac cath: 6/8/23:   Diagnostic Conclusions:   Right dominant coronary system There is significant 2 vessel CAD involving the ostial to mid LAD and proximal LCX LAD found to be significant and heavily calcified with IVUS investigation.  Recommendations:   - CT surgical evaluation for CABG vs high risk PCI   - Cont. ASA-81 daily   - High intensity statin therapy      [facility]   [facility] Faculty Practice   Office: [address] [place] [address]   Telephone: [phone]  Fax: [phone]                     Electrophysiology Consult Note                                                                                                                                          Consult requested by: FROILAN Rzaa  Reason for Consultation: AFib   History obtained by: pt and EMR    obtained: n/a  Community Cardiologist: Dr. Sanchez     HPI:  57 year old male, daily marijuana use, with HTN, osteoarthritis s/p right total hip replacement 8/10/22 c/b septic arthritis requiring multiple follow up surgeries and IV abx therapy, and atrial fibrillation. He presented to [facility] ED on 6/7/23 c/o mid sternal exertional chest pain described as a burning sensation with associated dyspnea, palpitations, and lightheadedness. LHC on 6/8 demonstrated multivessel disease and he was referred to [facility] for further eval. Admitted on 6/8/2023 for pre-op CABG with Dr. Duke and underwent CABGx3 (LIMA-LAD, vein-OM, Diag), Encompass RF ablation and Atriaclip on 6/9. Pt with post operative atrial fibrillation for which EP consult is requested.     Telemetry: AFib, average rates 90-115bpm with salvos into the 130s. No events noted.      PAST MEDICAL & SURGICAL HISTORY:  HTN (hypertension)  Afib  Osteoarthritis  History of right hip replacement  Obesity, morbid  S/P hip replacement, right    REVIEW OF SYSTEMS:  CONSTITUTIONAL: No fever, weight loss, or fatigue  EYES: No eye pain, visual disturbances, or discharge  ENMT:  No difficulty hearing, tinnitus, vertigo; No sinus or throat pain  NECK: No pain or stiffness  RESPIRATORY: No cough, wheezing, chills or hemoptysis; No shortness of breath  CARDIOVASCULAR: No chest pain, palpitations, dizziness, or leg swelling  GASTROINTESTINAL: No abdominal or epigastric pain. No nausea, vomiting, or hematemesis; No diarrhea or constipation. No melena or hematochezia.  GENITOURINARY: No dysuria, frequency, hematuria, or incontinence  NEUROLOGICAL: No headaches, memory loss, loss of strength, numbness, or tremors  ENDOCRINE: No heat or cold intolerance; No hair loss  MUSCULOSKELETAL: No joint pain or swelling; No muscle, back, or extremity pain  PSYCHIATRIC: No depression, anxiety, mood swings, or difficulty sleeping  HEME/LYMPH: No easy bruising, or bleeding gums  ALLERGY AND IMMUNOLOGIC: No hives or eczema    MEDICATIONS  (STANDING):  acetaminophen     Tablet .. 975 milliGRAM(s) Oral every 6 hours  aMIOdarone    Tablet 200 milliGRAM(s) Oral daily  apixaban 2.5 milliGRAM(s) Oral every 12 hours  aspirin enteric coated 81 milliGRAM(s) Oral daily  atorvastatin 80 milliGRAM(s) Oral at bedtime  chlorhexidine 2% Cloths 1 Application(s) Topical <User Schedule>  digoxin     Tablet 125 MICROGram(s) Oral every other day  gabapentin 300 milliGRAM(s) Oral three times a day  glucagon  Injectable 1 milliGRAM(s) IntraMuscular once  melatonin 3 milliGRAM(s) Oral at bedtime  methocarbamol 500 milliGRAM(s) Oral three times a day  metoprolol tartrate 25 milliGRAM(s) Oral every 6 hours  pantoprazole    Tablet 40 milliGRAM(s) Oral daily  polyethylene glycol 3350 17 Gram(s) Oral daily  senna 2 Tablet(s) Oral at bedtime  sodium chloride 0.9% lock flush 3 milliLiter(s) IV Push every 8 hours    MEDICATIONS  (PRN):    Allergies:  No Known Allergies    SOCIAL HISTORY:    FAMILY HISTORY:  No pertinent family history in first degree relatives    Vital Signs Last 24 Hrs  T(C): 36.5 (14 Jun 2023 08:01), Max: 36.7 (13 Jun 2023 16:04)  T(F): 97.7 (14 Jun 2023 08:01), Max: 98.1 (13 Jun 2023 16:04)  HR: 83 (14 Jun 2023 08:01) (75 - 100)  BP: 107/84 (14 Jun 2023 08:01) (107/84 - 126/84)  BP(mean): 87 (13 Jun 2023 16:00) (87 - 99)  RR: 18 (14 Jun 2023 08:01) (16 - 23)  SpO2: 98% (14 Jun 2023 08:01) (96% - 100%)    Parameters below as of 14 Jun 2023 08:01  Patient On (Oxygen Delivery Method): room air    Physical Exam:  Constitutional: AAOx3, NAD  Neck: supple, No JVD  Cardiovascular: +S1S2 RRR, no murmurs, rubs, gallops   Pulmonary: CTA b/l, unlabored, no wheezes, rales. rhonci  Abdomen: +BS, soft NTND  Extremities: no edema b/l, +distal pulses b/l  Neuro: non focal, speech clear, JARA x 4    LABS:                        9.7    10.12 )-----------( 311      ( 14 Jun 2023 05:10 )             31.8   06-14    138  |  102  |  23.3<H>  ----------------------------<  100<H>  4.6   |  25.0  |  0.85    Ca    8.9      14 Jun 2023 05:10  Mg     2.3     06-14    CARDIAC MARKERS ( 13 Jun 2023 01:13 )  x     / 0.22 ng/mL / 342 U/L / x     / 2.7 ng/mL    Echo: 6/3/23: (results obtained from [employer])  EF 55-60%, hypermobile interatrial septum, aortic root 4.3cm     NST: 6/16/22: (results obtained from [employer])   small fixed defect involving mid inferior wall, normal wall motion on gated study, normal EF     RADIOLOGY & ADDITIONAL STUDIES:  CT angio chest: 6/7/23:   INTERPRETATION:  Clinical information: Positive d-dimer levels. Evaluate for pulmonary embolus.  CT angiogram of the chest was obtained following administration of intravenous contrast. Approximately 70 cc of Omnipaque 350 was administered and 30 cc was discarded. Coronal, sagittal and MIP images were submitted for review.  No hilar and or mediastinal adenopathy is noted.  Heart is enlarged in size. Calcification of the aortic valve and the coronary arteries is noted. No pericardial effusion is noted. Pulmonary arteries are normal in caliber. No filling defects are noted.  No endobronchial lesions arenoted. Saber-sheath appearance of the trachea is noted. Lungs are clear. No pleural effusions are noted.  Below the diaphragm, visualized portions of the abdomen are unremarkable.  Degenerative changes of the spine are noted.  IMPRESSION: No pulmonary embolus is noted.  --- End of Report ---  HIREN BARLOW MD; Attending Radiologist  This document has been electronically signed. Jun 7 2023  9:49AM    Cardiac cath: 6/8/23:   Diagnostic Conclusions:   Right dominant coronary system There is significant 2 vessel CAD involving the ostial to mid LAD and proximal LCX LAD found to be significant and heavily calcified with IVUS investigation.  Recommendations:   - CT surgical evaluation for CABG vs high risk PCI   - Cont. ASA-81 daily   - High intensity statin therapy

## 2023-06-14 NOTE — CONSULT NOTE ADULT - NS ATTEND AMEND GEN_ALL_CORE FT
.  .  This is a 57 year old male with HTN, OA s/p R hip replacement c/b septic arthritis requiring multiple follow up surgeries and IV antibiotic therapy, atrial fibrillation, presenting with chest pain found to have multivessel disease now s/p 3VCABG, AF ablation and AtriClip (6/9/23) with post-op AF with RVR for whom EP is consulted.    I would avoid using Digoxin and Amiodarone together as this can precipitate Digoxin toxicity Instead, I would recommend a more aggressive Amiodarone load with Amiodarone 400mg TID x7 days (so far patient has only received ~1-1.5 gm of amiodarone) followed by 200mg daily. Please discontinue Digoxin. If his HR starts to get lower, would then lower Metoprolol. I recommend pursuing EVELYN/CV to restore normal rhythm and increasing Apixaban to 5mg BID for stroke prophylaxis. He has a CHADS2-VASc of 2 (HTN, CAD) so is at elevated risk of CVA, even with presence of BHARTI clip as LA thrombi can form outside of the BHARTI.    Discussed with CTICU team.    Thank you for this consult. We will follow with you.    Anil Marley MD  Clinical Cardiac Electrophysiology

## 2023-06-14 NOTE — PROGRESS NOTE ADULT - ASSESSMENT
57y Male daily Marijuana smoker with PMHx Afib, HTN, osteoarthritis, Right hip replacement which subsequently required 6 revisions received IV ABX via PICC line completed approx 3/2023), presented to [facility] ED on 6/7/23 with unstable angina. Pt had a LHC on 6/8/23 with Dr. Sanchez which revealed MVD. Admitted on 6/8/2023 for pre-op CABG with Dr. Duke. Underwent CABGx3, encompass procedure, BHARTI clip on 6/9. Post-op course c/b afib RVR, lopressor increased and patient dig loaded. Pt remains in afib rate controlled.

## 2023-06-14 NOTE — CONSULT NOTE ADULT - ASSESSMENT
Assessment/Plan:   57 year old male, daily marijuana use, with HTN, osteoarthritis s/p right total hip replacement 8/10/22 c/b septic arthritis requiring multiple follow up surgeries and IV abx therapy, and atrial fibrillation. He presented to [facility] ED on 6/7/23 c/o mid sternal exertional chest pain described as a burning sensation with associated dyspnea, palpitations, and lightheadedness. LHC on 6/8 demonstrated multivessel disease and he was referred to [facility] for further eval. Admitted on 6/8/2023 for pre-op CABG with Dr. Duke and underwent CABGx3 (LIMA-LAD, vein-OM, Diag), Encompass RF ablation and Atriaclip on 6/9. Pt with early recurrent atrial fibrillation for which EP consult is requested.     INCOMPLETE NOTE  Assessment/Plan:   57 year old male, daily marijuana use, with HTN, osteoarthritis s/p right total hip replacement 8/10/22 c/b septic arthritis requiring multiple follow up surgeries and IV abx therapy, and atrial fibrillation. He presented to [facility] ED on 6/7/23 c/o mid sternal exertional chest pain described as a burning sensation with associated dyspnea, palpitations, and lightheadedness. LHC on 6/8 demonstrated multivessel disease and he was referred to [facility] for further eval. Admitted on 6/8/2023 for pre-op CABG with Dr. Duke and underwent CABGx3 (LIMA-LAD, vein-OM, Diag), Encompass RF ablation and Atriaclip on 6/9. Pt with post operative atrial fibrillation for which EP consult is requested.     # AFib w/ RVR  - Increase Eliquis to 5mg Q 12 hrs   - Discontinue digoxin   - Increase amiodarone to loading dose  - Continue metoprolol 25mg Q 6 hrs   - Keep NPO for EVELYN/DCCV on 6/15/23 if OK by CTSx   - Continue telemetry monitoring  - monitor lytes: Keep K > 4, Mg > 2     # Multivessel CAD   - s/p 3vCABG on 6/9   - plan as per CTSx    Seen and discussed w/ Dr. Marley

## 2023-06-15 ENCOUNTER — TRANSCRIPTION ENCOUNTER (OUTPATIENT)
Age: 58
End: 2023-06-15

## 2023-06-15 LAB
ANION GAP SERPL CALC-SCNC: 12 MMOL/L — SIGNIFICANT CHANGE UP (ref 5–17)
BUN SERPL-MCNC: 19.2 MG/DL — SIGNIFICANT CHANGE UP (ref 8–20)
CALCIUM SERPL-MCNC: 8.9 MG/DL — SIGNIFICANT CHANGE UP (ref 8.4–10.5)
CHLORIDE SERPL-SCNC: 101 MMOL/L — SIGNIFICANT CHANGE UP (ref 96–108)
CO2 SERPL-SCNC: 24 MMOL/L — SIGNIFICANT CHANGE UP (ref 22–29)
CREAT SERPL-MCNC: 0.87 MG/DL — SIGNIFICANT CHANGE UP (ref 0.5–1.3)
EGFR: 102 ML/MIN/1.73M2 — SIGNIFICANT CHANGE UP
GLUCOSE SERPL-MCNC: 119 MG/DL — HIGH (ref 70–99)
HCT VFR BLD CALC: 30 % — LOW (ref 39–50)
HGB BLD-MCNC: 9.3 G/DL — LOW (ref 13–17)
MAGNESIUM SERPL-MCNC: 1.9 MG/DL — SIGNIFICANT CHANGE UP (ref 1.6–2.6)
MCHC RBC-ENTMCNC: 23.9 PG — LOW (ref 27–34)
MCHC RBC-ENTMCNC: 31 GM/DL — LOW (ref 32–36)
MCV RBC AUTO: 77.1 FL — LOW (ref 80–100)
PLATELET # BLD AUTO: 393 K/UL — SIGNIFICANT CHANGE UP (ref 150–400)
POTASSIUM SERPL-MCNC: 4.5 MMOL/L — SIGNIFICANT CHANGE UP (ref 3.5–5.3)
POTASSIUM SERPL-SCNC: 4.5 MMOL/L — SIGNIFICANT CHANGE UP (ref 3.5–5.3)
RBC # BLD: 3.89 M/UL — LOW (ref 4.2–5.8)
RBC # FLD: 15.7 % — HIGH (ref 10.3–14.5)
SODIUM SERPL-SCNC: 137 MMOL/L — SIGNIFICANT CHANGE UP (ref 135–145)
SURGICAL PATHOLOGY STUDY: SIGNIFICANT CHANGE UP
WBC # BLD: 12.25 K/UL — HIGH (ref 3.8–10.5)
WBC # FLD AUTO: 12.25 K/UL — HIGH (ref 3.8–10.5)

## 2023-06-15 PROCEDURE — 93010 ELECTROCARDIOGRAM REPORT: CPT

## 2023-06-15 PROCEDURE — 93306 TTE W/DOPPLER COMPLETE: CPT | Mod: 26

## 2023-06-15 PROCEDURE — 71045 X-RAY EXAM CHEST 1 VIEW: CPT | Mod: 26

## 2023-06-15 PROCEDURE — 99024 POSTOP FOLLOW-UP VISIT: CPT

## 2023-06-15 RX ORDER — MAGNESIUM SULFATE 500 MG/ML
1 VIAL (ML) INJECTION ONCE
Refills: 0 | Status: COMPLETED | OUTPATIENT
Start: 2023-06-15 | End: 2023-06-15

## 2023-06-15 RX ORDER — METOPROLOL TARTRATE 50 MG
100 TABLET ORAL DAILY
Refills: 0 | Status: DISCONTINUED | OUTPATIENT
Start: 2023-06-16 | End: 2023-06-16

## 2023-06-15 RX ADMIN — AMIODARONE HYDROCHLORIDE 400 MILLIGRAM(S): 400 TABLET ORAL at 22:17

## 2023-06-15 RX ADMIN — Medication 25 MILLIGRAM(S): at 17:33

## 2023-06-15 RX ADMIN — CHLORHEXIDINE GLUCONATE 1 APPLICATION(S): 213 SOLUTION TOPICAL at 06:15

## 2023-06-15 RX ADMIN — SODIUM CHLORIDE 3 MILLILITER(S): 9 INJECTION INTRAMUSCULAR; INTRAVENOUS; SUBCUTANEOUS at 14:01

## 2023-06-15 RX ADMIN — Medication 3 MILLIGRAM(S): at 22:17

## 2023-06-15 RX ADMIN — METHOCARBAMOL 500 MILLIGRAM(S): 500 TABLET, FILM COATED ORAL at 22:17

## 2023-06-15 RX ADMIN — AMIODARONE HYDROCHLORIDE 400 MILLIGRAM(S): 400 TABLET ORAL at 06:14

## 2023-06-15 RX ADMIN — ATORVASTATIN CALCIUM 80 MILLIGRAM(S): 80 TABLET, FILM COATED ORAL at 22:16

## 2023-06-15 RX ADMIN — Medication 975 MILLIGRAM(S): at 06:13

## 2023-06-15 RX ADMIN — Medication 975 MILLIGRAM(S): at 12:50

## 2023-06-15 RX ADMIN — SENNA PLUS 2 TABLET(S): 8.6 TABLET ORAL at 22:17

## 2023-06-15 RX ADMIN — Medication 25 MILLIGRAM(S): at 06:13

## 2023-06-15 RX ADMIN — GABAPENTIN 300 MILLIGRAM(S): 400 CAPSULE ORAL at 06:13

## 2023-06-15 RX ADMIN — SODIUM CHLORIDE 3 MILLILITER(S): 9 INJECTION INTRAMUSCULAR; INTRAVENOUS; SUBCUTANEOUS at 06:15

## 2023-06-15 RX ADMIN — SODIUM CHLORIDE 3 MILLILITER(S): 9 INJECTION INTRAMUSCULAR; INTRAVENOUS; SUBCUTANEOUS at 22:16

## 2023-06-15 RX ADMIN — Medication 975 MILLIGRAM(S): at 17:34

## 2023-06-15 RX ADMIN — APIXABAN 5 MILLIGRAM(S): 2.5 TABLET, FILM COATED ORAL at 17:33

## 2023-06-15 RX ADMIN — Medication 100 GRAM(S): at 09:18

## 2023-06-15 RX ADMIN — METHOCARBAMOL 500 MILLIGRAM(S): 500 TABLET, FILM COATED ORAL at 06:13

## 2023-06-15 RX ADMIN — APIXABAN 5 MILLIGRAM(S): 2.5 TABLET, FILM COATED ORAL at 06:14

## 2023-06-15 NOTE — PROGRESS NOTE ADULT - NS ATTEND AMEND GEN_ALL_CORE FT
I have seen and examined patient with NP, agree with above assessment and plan.
converted to sinus rhythm spontaneously. continue metoprolol and amiodarone as above. continue eliquis. Will sign off. Please call with any questions.

## 2023-06-15 NOTE — PROGRESS NOTE ADULT - ASSESSMENT
Assesment/Plan:   57 year old male, daily marijuana use, with HTN, osteoarthritis s/p right total hip replacement 8/10/22 c/b septic arthritis requiring multiple follow up surgeries and IV abx therapy, and atrial fibrillation. He presented to [facility] ED on 6/7/23 c/o mid sternal exertional chest pain described as a burning sensation with associated dyspnea, palpitations, and lightheadedness. LHC on 6/8 demonstrated multivessel disease and he was referred to [facility] for further eval. Admitted on 6/8/2023 for pre-op CABG with Dr. Duke and underwent CABGx3 (LIMA-LAD, vein-OM, Diag), Encompass RF ablation and Atriaclip on 6/9. Pt with post operative atrial fibrillation for which EP consult is requested. Pt converted to sinus rhythm at 12:35PM on 6/14/23 and remains in sinus rhythm at this time.     # AFib w/ RVR  - now in sinus rhythm   - Continue Eliquis to 5mg Q 12 hrs   - Continue amiodarone 400mg TID for 7 total days then decrease to 200mg daily.   - Continue metoprolol 25mg Q 12 hrs   - Continue telemetry monitoring  - monitor lytes: Keep K > 4, Mg > 2     # Multivessel CAD   - s/p 3vCABG on 6/9   - plan as per CTSx    Outpatient follow up with EP.  Assesment/Plan:   57 year old male, daily marijuana use, with HTN, osteoarthritis s/p right total hip replacement 8/10/22 c/b septic arthritis requiring multiple follow up surgeries and IV abx therapy, and atrial fibrillation. He presented to [facility] ED on 6/7/23 c/o mid sternal exertional chest pain described as a burning sensation with associated dyspnea, palpitations, and lightheadedness. LHC on 6/8 demonstrated multivessel disease and he was referred to [facility] for further eval. Admitted on 6/8/2023 for pre-op CABG with Dr. Duke and underwent CABGx3 (LIMA-LAD, vein-OM, Diag), Encompass RF ablation and Atriaclip on 6/9. Pt with post operative atrial fibrillation for which EP consult is requested. Pt converted to sinus rhythm at 12:35PM on 6/14/23 and remains in sinus rhythm at this time.     # AFib w/ RVR  - now in sinus rhythm   - Continue Eliquis to 5mg Q 12 hrs   - Continue amiodarone 400mg TID for 7 total days then decrease to 200mg daily.   - Continue metoprolol 25mg Q 12 hrs   - Continue telemetry monitoring  - monitor lytes: Keep K > 4, Mg > 2     # Multivessel CAD   - s/p 3vCABG on 6/9   - plan as per CTSx    Outpatient follow up with EP. Discussed w/ Dr. Alfaro

## 2023-06-15 NOTE — PROGRESS NOTE ADULT - ASSESSMENT
57 year old male daily Marijuana smoker with PMHx Afib, HTN, osteoarthritis, Right hip replacement which subsequently required 6 revisions (received IV ABX via PICC line completed approx 3/2023), presented to [facility] ED on 6/7/23 with unstable angina. Pt had a LHC on 6/8/23 with Dr. Sanchez which revealed Multivesssel CAD. Patient electively admitted on 6/8/23 and underwent CABG x 3, Encompass procedure, and BHARTI clipping on 6/9/23 with Dr. Duke. Post-op course complicated by afib RVR (now converted to NSR with PO Amio load).

## 2023-06-16 ENCOUNTER — TRANSCRIPTION ENCOUNTER (OUTPATIENT)
Age: 58
End: 2023-06-16

## 2023-06-16 VITALS
SYSTOLIC BLOOD PRESSURE: 130 MMHG | DIASTOLIC BLOOD PRESSURE: 69 MMHG | TEMPERATURE: 98 F | RESPIRATION RATE: 20 BRPM | OXYGEN SATURATION: 98 % | HEART RATE: 60 BPM

## 2023-06-16 PROBLEM — Z96.641 PRESENCE OF RIGHT ARTIFICIAL HIP JOINT: Chronic | Status: ACTIVE | Noted: 2023-06-08

## 2023-06-16 PROBLEM — I48.91 UNSPECIFIED ATRIAL FIBRILLATION: Chronic | Status: ACTIVE | Noted: 2023-06-08

## 2023-06-16 LAB
ANION GAP SERPL CALC-SCNC: 12 MMOL/L — SIGNIFICANT CHANGE UP (ref 5–17)
BUN SERPL-MCNC: 21.7 MG/DL — HIGH (ref 8–20)
CALCIUM SERPL-MCNC: 9 MG/DL — SIGNIFICANT CHANGE UP (ref 8.4–10.5)
CHLORIDE SERPL-SCNC: 103 MMOL/L — SIGNIFICANT CHANGE UP (ref 96–108)
CO2 SERPL-SCNC: 23 MMOL/L — SIGNIFICANT CHANGE UP (ref 22–29)
CREAT SERPL-MCNC: 0.93 MG/DL — SIGNIFICANT CHANGE UP (ref 0.5–1.3)
DIGOXIN SERPL-MCNC: 0.5 NG/ML — LOW (ref 0.8–2)
EGFR: 97 ML/MIN/1.73M2 — SIGNIFICANT CHANGE UP
GLUCOSE SERPL-MCNC: 112 MG/DL — HIGH (ref 70–99)
HCT VFR BLD CALC: 28.1 % — LOW (ref 39–50)
HGB BLD-MCNC: 8.7 G/DL — LOW (ref 13–17)
MAGNESIUM SERPL-MCNC: 2.2 MG/DL — SIGNIFICANT CHANGE UP (ref 1.6–2.6)
MCHC RBC-ENTMCNC: 24.1 PG — LOW (ref 27–34)
MCHC RBC-ENTMCNC: 31 GM/DL — LOW (ref 32–36)
MCV RBC AUTO: 77.8 FL — LOW (ref 80–100)
PLATELET # BLD AUTO: 372 K/UL — SIGNIFICANT CHANGE UP (ref 150–400)
POTASSIUM SERPL-MCNC: 4.6 MMOL/L — SIGNIFICANT CHANGE UP (ref 3.5–5.3)
POTASSIUM SERPL-SCNC: 4.6 MMOL/L — SIGNIFICANT CHANGE UP (ref 3.5–5.3)
RBC # BLD: 3.61 M/UL — LOW (ref 4.2–5.8)
RBC # FLD: 15.7 % — HIGH (ref 10.3–14.5)
SODIUM SERPL-SCNC: 138 MMOL/L — SIGNIFICANT CHANGE UP (ref 135–145)
WBC # BLD: 11.48 K/UL — HIGH (ref 3.8–10.5)
WBC # FLD AUTO: 11.48 K/UL — HIGH (ref 3.8–10.5)

## 2023-06-16 PROCEDURE — 88305 TISSUE EXAM BY PATHOLOGIST: CPT

## 2023-06-16 PROCEDURE — 93325 DOPPLER ECHO COLOR FLOW MAPG: CPT

## 2023-06-16 PROCEDURE — 84132 ASSAY OF SERUM POTASSIUM: CPT

## 2023-06-16 PROCEDURE — 82435 ASSAY OF BLOOD CHLORIDE: CPT

## 2023-06-16 PROCEDURE — 71045 X-RAY EXAM CHEST 1 VIEW: CPT | Mod: 26

## 2023-06-16 PROCEDURE — 80061 LIPID PANEL: CPT

## 2023-06-16 PROCEDURE — 83520 IMMUNOASSAY QUANT NOS NONAB: CPT

## 2023-06-16 PROCEDURE — 85576 BLOOD PLATELET AGGREGATION: CPT

## 2023-06-16 PROCEDURE — 85027 COMPLETE CBC AUTOMATED: CPT

## 2023-06-16 PROCEDURE — 93312 ECHO TRANSESOPHAGEAL: CPT

## 2023-06-16 PROCEDURE — C1887: CPT

## 2023-06-16 PROCEDURE — 93005 ELECTROCARDIOGRAM TRACING: CPT

## 2023-06-16 PROCEDURE — 93308 TTE F-UP OR LMTD: CPT

## 2023-06-16 PROCEDURE — 84443 ASSAY THYROID STIM HORMONE: CPT

## 2023-06-16 PROCEDURE — 71045 X-RAY EXAM CHEST 1 VIEW: CPT

## 2023-06-16 PROCEDURE — 85730 THROMBOPLASTIN TIME PARTIAL: CPT

## 2023-06-16 PROCEDURE — 92978 ENDOLUMINL IVUS OCT C 1ST: CPT | Mod: LD

## 2023-06-16 PROCEDURE — 81001 URINALYSIS AUTO W/SCOPE: CPT

## 2023-06-16 PROCEDURE — 87641 MR-STAPH DNA AMP PROBE: CPT

## 2023-06-16 PROCEDURE — C1889: CPT

## 2023-06-16 PROCEDURE — 84295 ASSAY OF SERUM SODIUM: CPT

## 2023-06-16 PROCEDURE — 83036 HEMOGLOBIN GLYCOSYLATED A1C: CPT

## 2023-06-16 PROCEDURE — 80053 COMPREHEN METABOLIC PANEL: CPT

## 2023-06-16 PROCEDURE — C1751: CPT

## 2023-06-16 PROCEDURE — C1753: CPT

## 2023-06-16 PROCEDURE — 36415 COLL VENOUS BLD VENIPUNCTURE: CPT

## 2023-06-16 PROCEDURE — 93458 L HRT ARTERY/VENTRICLE ANGIO: CPT

## 2023-06-16 PROCEDURE — 82962 GLUCOSE BLOOD TEST: CPT

## 2023-06-16 PROCEDURE — 86850 RBC ANTIBODY SCREEN: CPT

## 2023-06-16 PROCEDURE — C1769: CPT

## 2023-06-16 PROCEDURE — 82553 CREATINE MB FRACTION: CPT

## 2023-06-16 PROCEDURE — 82330 ASSAY OF CALCIUM: CPT

## 2023-06-16 PROCEDURE — 83735 ASSAY OF MAGNESIUM: CPT

## 2023-06-16 PROCEDURE — 97530 THERAPEUTIC ACTIVITIES: CPT

## 2023-06-16 PROCEDURE — 84134 ASSAY OF PREALBUMIN: CPT

## 2023-06-16 PROCEDURE — 94002 VENT MGMT INPAT INIT DAY: CPT

## 2023-06-16 PROCEDURE — 86900 BLOOD TYPING SEROLOGIC ABO: CPT

## 2023-06-16 PROCEDURE — 94010 BREATHING CAPACITY TEST: CPT

## 2023-06-16 PROCEDURE — 80162 ASSAY OF DIGOXIN TOTAL: CPT

## 2023-06-16 PROCEDURE — 85025 COMPLETE CBC W/AUTO DIFF WBC: CPT

## 2023-06-16 PROCEDURE — 86891 AUTOLOGOUS BLOOD OP SALVAGE: CPT

## 2023-06-16 PROCEDURE — 84484 ASSAY OF TROPONIN QUANT: CPT

## 2023-06-16 PROCEDURE — 97116 GAIT TRAINING THERAPY: CPT

## 2023-06-16 PROCEDURE — 82947 ASSAY GLUCOSE BLOOD QUANT: CPT

## 2023-06-16 PROCEDURE — 86901 BLOOD TYPING SEROLOGIC RH(D): CPT

## 2023-06-16 PROCEDURE — 87640 STAPH A DNA AMP PROBE: CPT

## 2023-06-16 PROCEDURE — 85018 HEMOGLOBIN: CPT

## 2023-06-16 PROCEDURE — 88331 PATH CONSLTJ SURG 1 BLK 1SPC: CPT

## 2023-06-16 PROCEDURE — 82550 ASSAY OF CK (CPK): CPT

## 2023-06-16 PROCEDURE — C1894: CPT

## 2023-06-16 PROCEDURE — 85014 HEMATOCRIT: CPT

## 2023-06-16 PROCEDURE — 93320 DOPPLER ECHO COMPLETE: CPT

## 2023-06-16 PROCEDURE — 82803 BLOOD GASES ANY COMBINATION: CPT

## 2023-06-16 PROCEDURE — 83880 ASSAY OF NATRIURETIC PEPTIDE: CPT

## 2023-06-16 PROCEDURE — 80048 BASIC METABOLIC PNL TOTAL CA: CPT

## 2023-06-16 PROCEDURE — 86923 COMPATIBILITY TEST ELECTRIC: CPT

## 2023-06-16 PROCEDURE — 93880 EXTRACRANIAL BILAT STUDY: CPT

## 2023-06-16 PROCEDURE — 83605 ASSAY OF LACTIC ACID: CPT

## 2023-06-16 PROCEDURE — 85610 PROTHROMBIN TIME: CPT

## 2023-06-16 PROCEDURE — 93010 ELECTROCARDIOGRAM REPORT: CPT

## 2023-06-16 RX ORDER — APIXABAN 2.5 MG/1
1 TABLET, FILM COATED ORAL
Qty: 60 | Refills: 1
Start: 2023-06-16 | End: 2023-08-14

## 2023-06-16 RX ORDER — ACETAMINOPHEN 500 MG
2 TABLET ORAL
Qty: 0 | Refills: 0 | DISCHARGE
Start: 2023-06-16

## 2023-06-16 RX ORDER — OXYCODONE HYDROCHLORIDE 5 MG/1
1 TABLET ORAL
Qty: 20 | Refills: 0
Start: 2023-06-16 | End: 2023-06-20

## 2023-06-16 RX ORDER — DILTIAZEM HCL 120 MG
1 CAPSULE, EXT RELEASE 24 HR ORAL
Refills: 0 | DISCHARGE

## 2023-06-16 RX ORDER — METOPROLOL TARTRATE 50 MG
1 TABLET ORAL
Qty: 30 | Refills: 1
Start: 2023-06-16 | End: 2023-08-14

## 2023-06-16 RX ORDER — ATORVASTATIN CALCIUM 80 MG/1
1 TABLET, FILM COATED ORAL
Qty: 30 | Refills: 1
Start: 2023-06-16 | End: 2023-08-14

## 2023-06-16 RX ORDER — METOPROLOL TARTRATE 50 MG
1 TABLET ORAL
Refills: 0 | DISCHARGE

## 2023-06-16 RX ORDER — AMIODARONE HYDROCHLORIDE 400 MG/1
1 TABLET ORAL
Qty: 30 | Refills: 1
Start: 2023-06-16 | End: 2023-08-14

## 2023-06-16 RX ORDER — SENNA PLUS 8.6 MG/1
2 TABLET ORAL
Qty: 10 | Refills: 0
Start: 2023-06-16 | End: 2023-06-20

## 2023-06-16 RX ADMIN — METHOCARBAMOL 500 MILLIGRAM(S): 500 TABLET, FILM COATED ORAL at 06:30

## 2023-06-16 RX ADMIN — APIXABAN 5 MILLIGRAM(S): 2.5 TABLET, FILM COATED ORAL at 06:30

## 2023-06-16 RX ADMIN — Medication 25 MILLIGRAM(S): at 00:06

## 2023-06-16 RX ADMIN — Medication 81 MILLIGRAM(S): at 08:56

## 2023-06-16 RX ADMIN — Medication 975 MILLIGRAM(S): at 06:30

## 2023-06-16 RX ADMIN — Medication 975 MILLIGRAM(S): at 01:05

## 2023-06-16 RX ADMIN — Medication 100 MILLIGRAM(S): at 08:56

## 2023-06-16 RX ADMIN — AMIODARONE HYDROCHLORIDE 400 MILLIGRAM(S): 400 TABLET ORAL at 06:30

## 2023-06-16 RX ADMIN — SODIUM CHLORIDE 3 MILLILITER(S): 9 INJECTION INTRAMUSCULAR; INTRAVENOUS; SUBCUTANEOUS at 06:31

## 2023-06-16 RX ADMIN — Medication 975 MILLIGRAM(S): at 07:35

## 2023-06-16 RX ADMIN — AMIODARONE HYDROCHLORIDE 400 MILLIGRAM(S): 400 TABLET ORAL at 13:03

## 2023-06-16 RX ADMIN — PANTOPRAZOLE SODIUM 40 MILLIGRAM(S): 20 TABLET, DELAYED RELEASE ORAL at 08:56

## 2023-06-16 RX ADMIN — METHOCARBAMOL 500 MILLIGRAM(S): 500 TABLET, FILM COATED ORAL at 13:03

## 2023-06-16 RX ADMIN — Medication 975 MILLIGRAM(S): at 14:03

## 2023-06-16 RX ADMIN — Medication 975 MILLIGRAM(S): at 00:05

## 2023-06-16 RX ADMIN — SODIUM CHLORIDE 3 MILLILITER(S): 9 INJECTION INTRAMUSCULAR; INTRAVENOUS; SUBCUTANEOUS at 13:02

## 2023-06-16 RX ADMIN — Medication 975 MILLIGRAM(S): at 13:03

## 2023-06-16 NOTE — DISCHARGE NOTE PROVIDER - NSDCCPTREATMENT_GEN_ALL_CORE_FT
PRINCIPAL PROCEDURE  Procedure: CABG, with EVELYN  Findings and Treatment: CABGx3 LIMA-LAD, Vein-OM, Diag.  Encompass RF Ablation, 45mm Atriclip

## 2023-06-16 NOTE — DISCHARGE NOTE PROVIDER - NSDCFUADDINST_GEN_ALL_CORE_FT
Please call the Cardiothoracic Surgery office at [phone] if you are experiencing any shortness of breath, chest pain, fevers or chills, drainage from the incisions, persistent nausea, vomiting or if you have any questions about your medications. If the symptoms are severe, call 911 and go to the nearest hospital. You can also call [phone] for an emergency [facility] ambulance, which will take you to the closest [facility].    If you need any assistance for making any appointments for a new consult or referral in any specialty, please call our [facility] Clinical Coordination Center at [phone].

## 2023-06-16 NOTE — CHART NOTE - NSCHARTNOTEFT_GEN_A_CORE
istop, pt last received oxycodone 6/1, 7 day supply, pt admitted to hospital 6/8. postop there was a concern pt had h/o selling oxycodone on street, pt denies. per lucy Carter to give pt a prescription of oxy for standard postop amount.

## 2023-06-16 NOTE — PROGRESS NOTE ADULT - PROVIDER SPECIALTY LIST ADULT
Critical Care
Critical Care
Endocrinology
Critical Care
Electrophysiology
Intervent Cardiology
CT Surgery

## 2023-06-16 NOTE — DISCHARGE NOTE PROVIDER - HOSPITAL COURSE
57 year old male daily Marijuana smoker with PMHx Afib, HTN, osteoarthritis, Right hip replacement which subsequently required 6 revisions (received IV ABX via PICC line completed approx 3/2023), presented to [facility] ED on 6/7/23 with unstable angina. Pt had a LHC on 6/8/23 with Dr. Sanchez which revealed Multivesssel CAD. Patient electively admitted on 6/8/23 and underwent CABG x 3, Encompass procedure, and BHARTI clipping on 6/9/23 with Dr. Duke. Post-op course complicated by afib RVR (now converted to NSR with PO Amio load). Patient remains hemodynamically stable and stable from a respiratory standpoint at this time. Plan for discharge home later today as per Dr. Duke.    Pre-discharge TTE:  < from: TTE Echo Limited or F/U (06.15.23 @ 10:40) >  Summary:   1. Left ventricular ejection fraction, by visual estimation, is 60 to 65%.   2. Normal global left ventricular systolic function.   3. Normal right ventricular size and function.   4. There is no evidence of pericardial effusion.   5. Trace mitral valve regurgitation.   6.Sclerotic aortic valve with normal opening.  < end of copied text >

## 2023-06-16 NOTE — DISCHARGE NOTE NURSING/CASE MANAGEMENT/SOCIAL WORK - NSDCFUADDAPPT_GEN_ALL_CORE_FT
Please follow up with Dr. Duke on _____________.   The cardiac surgery office is located at [facility], first floor. Take a left at the end of the lobby until the end of that kam (past the elevator bank). Make a left and the office is on your right across from the elevators.    Your Care Navigator Nurse Practitioner will be in touch to see you in your home within a few days from discharge. The Follow Your Heart program can help ensure you understand your medications, discharge instructions and answer any questions you may have at that time. They are also a great source to address concerns during the day and may be reached at [phone].    Please follow up with your Cardiologist and Primary Care Physician 2-4 weeks from discharge.

## 2023-06-16 NOTE — PROGRESS NOTE ADULT - PROBLEM SELECTOR PLAN 2
Continue lopressor
Continue lopressor as tolerated by HR and BP.
Continue lopressor as tolerated by HR/BP
Continue nicardipine for BP control in immediate postop setting.  Lopressor to start this AM.
Continue lopressor as tolerated by HR and BP.
Continue lopressor
Continue lopressor

## 2023-06-16 NOTE — PROGRESS NOTE ADULT - PROBLEM SELECTOR PLAN 4
Lovenox and SCDs for DVT prophylaxis  Protonix for GI prophylaxis  continue with bowel regimen     Plan to be discussed with Dr. Duke  and CTS team during AM rounds.
Lovenox and SCDs for DVT prophylaxis  Protonix for GI prophylaxis  continue with bowel regimen       Plan to be discussed with Dr. Duke  and CTS team during AM rounds.
Eliquis and SCDs for DVT prophylaxis.  Protonix for GI prophylaxis.  Continue with bowel regimen PRN constipation.    Dispo: Home  Plan to be discussed / reviewed with CT Surgery attending / team during AM rounds.
Lovenox and SCDs for DVT prophylaxis  Protonix for GI prophylaxis  continue with bowel regimen   Abx and strict glycemic control for SWI PPX - insulin gtt transitioned to ELOISE. high insulin requirements resolved. likely transient stress hyperglycemia.
Eliquis and SCDs for DVT prophylaxis.  Protonix for GI prophylaxis.  Continue with bowel regimen PRN constipation.    Plan to be discussed / reviewed with CT Surgery attending / team during AM rounds.
Lovenox and SCDs for DVT prophylaxis  Protonix for GI prophylaxis  continue with bowel regimen   Abx for SWI PPX
Lovenox and SCDs for DVT prophylaxis  Protonix for GI prophylaxis  continue with bowel regimen   Abx and strict glycemic control for SWI PPX - requiring high dose insulin gtt to maintain euglycemia. Will hold off transitioning to basal/bolus until better controlled. Will consider endocrinology consult - non-diabetic per A1c.

## 2023-06-16 NOTE — DISCHARGE NOTE PROVIDER - NSDCFUSCHEDAPPT_GEN_ALL_CORE_FT
Ben Sanchez  [facility] Physician [facility]  CARDIOLOGY [address]  Scheduled Appointment: 07/05/2023     Fred Duke  [facility]  CTSURG [address]  Scheduled Appointment: 06/27/2023    Ben Sanchez  [facility]  CARDIOLOGY [address]  Scheduled Appointment: 07/05/2023

## 2023-06-16 NOTE — DISCHARGE NOTE PROVIDER - CARE PROVIDER_API CALL
Fred Duke  Thoracic and Cardiac Surgery  [address] [place] [address]  Phone: [phone]  Fax: [phone]  Follow Up Time:     Selim, Samy Mohsen  Interventional Cardiology  [address] [facility], [address]  [place] [address]  Phone: [phone]  Fax: [phone]  Follow Up Time:

## 2023-06-16 NOTE — PROGRESS NOTE ADULT - PROBLEM SELECTOR PROBLEM 1
Coronary artery disease with unstable angina pectoris

## 2023-06-16 NOTE — PROGRESS NOTE ADULT - PROBLEM SELECTOR PLAN 1
S/p CABG x 3, [facility] procedure, and BHARTI clipping on 6/9/23.   Neurologically intact.  Hemodynamically stable.  Continue lopressor as tolerated by HR and BP.   Continue aspirin for acute graft closure prophylaxis.  Continue statin for chronic graft patency prophylaxis.  Oxygenating well, coughing and deep breathing exercises/incentive spirometry encouraged.  Continue diuresis PRN, monitor strict I/O, replenish electrolytes PRN to keep K>4 and Mg>2.   Continue with PT, increase activity as tolerated.  Tylenol, gabapentin, Robaxin, Oxy PRN for analgesia.  Continue bowel regimen PRN constipation.   Case and plan to be reviewed / discussed with CT Surgery attending / team during AM rounds.
S/p CABG   Pressors and inotropes weaned off.   Wean IV anti-hypertensive agents as tolerated  Beta blocker as tolerated by HR and SBP to start  Lipitor for chronic graft patency prophylaxis  Aspirin for acute graft closure prophylaxis  Encourage PO intake  Encourage OOB to chair and ambulation with PT  Encourage deep breathing exercised and coughing  Chest PT and IS use with bedside nurse
S/p CABG x 3, [facility] procedure, and BHARTI clipping on 6/9/23.   Neurologically intact.  Hemodynamically stable.  Continue lopressor as tolerated by HR and BP.   Continue aspirin for acute graft closure prophylaxis.  Continue statin for chronic graft patency prophylaxis.  Oxygenating well, coughing and deep breathing exercises/incentive spirometry encouraged.  Continue to monitor need for diuresis, monitor strict I/O, replenish electrolytes PRN to keep K>4 and Mg>2.   Continue with PT, increase activity as tolerated.  Tylenol, gabapentin, Robaxin, Oxy PRN for analgesia.  Continue bowel regimen PRN constipation.   Case and plan to be reviewed / discussed with CT Surgery attending / team during AM rounds.
S/p CABG   Continue Beta blocker as tolerated by HR and SBP   Lipitor for chronic graft patency prophylaxis  Aspirin for acute graft closure prophylaxis  Encourage PO intake  Encourage OOB to chair and ambulate as tolerated  Encourage deep breathing exercised and coughing  Chest PT and IS use with bedside nurse  Plan to be discussed with Dr. Duke in AM rounds
S/p CABG   Pressors and inotropes weaned off.   Nicardipine transitioned to lopressor  Continue Beta blocker as tolerated by HR and SBP to start  Lipitor for chronic graft patency prophylaxis  Aspirin for acute graft closure prophylaxis  Encourage PO intake  Encourage OOB to chair and ambulation with PT  Encourage deep breathing exercised and coughing  Chest PT and IS use with bedside nurse
S/p CABG   Continue Beta blocker as tolerated by HR and SBP   Lipitor for chronic graft patency prophylaxis  Aspirin for acute graft closure prophylaxis  Encourage PO intake  Encourage OOB to chair and ambulation with PT  Encourage deep breathing exercised and coughing  Chest PT and IS use with bedside nurse  pain management   bowel regimen
S/p CABG   Continue Beta blocker as tolerated by HR and SBP to start  Lipitor for chronic graft patency prophylaxis  Aspirin for acute graft closure prophylaxis  Encourage PO intake  Encourage OOB to chair and ambulation with PT  Encourage deep breathing exercised and coughing  Chest PT and IS use with bedside nurse

## 2023-06-16 NOTE — DISCHARGE NOTE PROVIDER - NSDCCPCAREPLAN_GEN_ALL_CORE_FT
PRINCIPAL DISCHARGE DIAGNOSIS  Diagnosis: Coronary artery disease with unstable angina pectoris  Assessment and Plan of Treatment: Please refer to discharge instructions in your folder.  Shower daily with soap and water to the incision. No bathing/hot tubs/pools/swimming. No lotions/creams to incisions.  Take all your medications as prescribed.  Keep your follow up appointments.      SECONDARY DISCHARGE DIAGNOSES  Diagnosis: Afib  Assessment and Plan of Treatment: While you were in the hospital, you had an arrhythmia called atrial fibrillation. This is a common arrhythmia seen after heart surgery but it is important to take your medications as ordered to reduce the risk of it recurring or continuing in the future. You are on a blood thinner (Eliquis) to reduce your risk of a stroke with this arrhythmia. If you are experiencing chest pain or palpitations, please call us right away.

## 2023-06-16 NOTE — PROGRESS NOTE ADULT - SUBJECTIVE AND OBJECTIVE BOX
ARSLAN BARBOSA  MRN#: [id number]  Subjective: The patient was in the CTICU, seen and evaluate on AM rounds with the entire multidisciplinary team.     OBJECTIVE:  ICU Vital Signs Last 24 Hrs  T(C): 37.1 (12 Jun 2023 12:00), Max: 37.1 (12 Jun 2023 08:00)  T(F): 98.7 (12 Jun 2023 12:00), Max: 98.7 (12 Jun 2023 08:00)  HR: 72 (12 Jun 2023 09:00) (70 - 140)  BP: 103/62 (12 Jun 2023 09:00) (99/58 - 132/66)  BP(mean): 79 (12 Jun 2023 09:00) (75 - 105)  ABP: --  ABP(mean): --  RR: 18 (12 Jun 2023 09:00) (15 - 30)  SpO2: 92% (12 Jun 2023 09:00) (91% - 96%)    O2 Parameters below as of 12 Jun 2023 09:00  Patient On (Oxygen Delivery Method): room air        06-11 @ 07:01  -  06-12 @ 07:00  --------------------------------------------------------  IN: 1360 mL / OUT: 2283 mL / NET: -923 mL    06-12 @ 07:01  -  06-12 @ 12:38  --------------------------------------------------------  IN: 0 mL / OUT: 257 mL / NET: -257 mL        PHYSICAL EXAM:Daily Height in cm: 185.42 (12 Jun 2023 00:25)    Daily   General: WN/WD NAD    HEENT:     + NCAT  + EOMI  - Conjuctival edema   - Icterus   - Thrush   - ETT  - NGT/OGT  Neck:         + FROM    + JVD     - Nodes     - Masses    + Mid-line trachea   - Tracheostomy  Chest:         - Sternal click  - Sternal drainage  + Pacing wires  + Chest tubes  - SubQ emphysema  Lungs:          + CTA   - Rhonchi    - Rales    - Wheezing     - Decreased BS   - Dullness R L  Cardiac:       + S1 + S2    - RRR   + Irregular   - S3  - S4    - Murmurs   - Rub   - Hamman’s sign   Abdomen:    + BS     + Soft    + Non-tender     - Distended    - Organomegaly  - PEG  Extremities:   - Cyanosis U/L   - Clubbing  U/L  - LE/UE Edema   + Capillary refill    + Pulses   Neuro:        + Awake   +  Alert   - Confused   - Lethargic   - Sedated   - Generalized Weakness  Skin:        - Rashes    - Erythema   + Normal incisions   + IV sites intact  - Sacral decubitus    HOSPITAL MEDICATIONS: All mediciations reviewed and analyzed  MEDICATIONS  (STANDING):  acetaminophen     Tablet .. 975 milliGRAM(s) Oral every 6 hours  aMIOdarone    Tablet 200 milliGRAM(s) Oral daily  aspirin enteric coated 81 milliGRAM(s) Oral daily  atorvastatin 80 milliGRAM(s) Oral at bedtime  digoxin  Injectable 250 MICROGram(s) IV Push once  enoxaparin Injectable 40 milliGRAM(s) SubCutaneous every 24 hours  furosemide    Tablet 40 milliGRAM(s) Oral daily  gabapentin 300 milliGRAM(s) Oral every 8 hours  glucagon  Injectable 1 milliGRAM(s) IntraMuscular once  insulin lispro (ADMELOG) corrective regimen sliding scale   SubCutaneous three times a day before meals  insulin lispro (ADMELOG) corrective regimen sliding scale   SubCutaneous at bedtime  methocarbamol 500 milliGRAM(s) Oral three times a day  metoprolol tartrate 100 milliGRAM(s) Oral two times a day  pantoprazole    Tablet 40 milliGRAM(s) Oral daily  polyethylene glycol 3350 17 Gram(s) Oral daily  potassium chloride   Powder 20 milliEquivalent(s) Oral daily  senna 2 Tablet(s) Oral at bedtime    MEDICATIONS  (PRN):  dextrose Oral Gel 15 Gram(s) Oral once PRN Blood Glucose LESS THAN 70 milliGRAM(s)/deciliter  oxyCODONE    IR 10 milliGRAM(s) Oral every 4 hours PRN Severe Pain (7 - 10)  oxyCODONE    IR 5 milliGRAM(s) Oral every 4 hours PRN Moderate Pain (4 - 6)    LABS: All Lab data reviewed and analyzed                        8.4    13.86 )-----------( 200      ( 12 Jun 2023 02:10 )             27.0    06-12    138  |  102  |  22.8<H>  ----------------------------<  93  4.0   |  26.0  |  0.97    Ca    8.3<L>      12 Jun 2023 02:10  Mg     2.1     06-12    I independently reviewed CXR overnight from today 06-12-23 @ 12:38 and ruled out effusion, PTX, or collapse of lung   I independently reviewed ECG from this AM and ruled out significant conduction system abnormalities and ischemia, AFIB RVR    Assessment: Respiratory insufficieny, hypervolemia, and AFIB-RVR     Plan:   - Respiratory status required the following of continuous pulse oximetry for support & to prevent decompensation  - Continued early mobilization as tolerated  - Patient requires deresuscitation for perioperative fluid administration and edema, started Lasix-KCL po  - Addressed analgesic regimen to optimize function  - ASA continued for graft occlusion-thromboembolism prophylaxis  - Lipitor for long term graft patency  - Lovenox continued for VTE prophylaxis in addition to Venodyne boots, starting Eliquis for AFIB in AM  - Protonix maintained for GI bleeding prophylaxis  - Lopressor continued for atrial fibrillation prophylaxis  - Metabolic stability & infection prophylaxis required review and adjustment of regular Insulin sliding scale and gylcemic regimen while following serial glucose levels to help achieve & maintain euglycemia  - Reviewed & addressed surgical site infection prophylaxis regimen    
  ARSLAN BARBOSA  MRN#: [id number]  Subjective: The patient was in the CTICU, seen and evaluate on AM rounds with the entire multidisciplinary team.     OBJECTIVE:  ICU Vital Signs Last 24 Hrs  T(C): 36.8 (13 Jun 2023 08:00), Max: 37.1 (12 Jun 2023 12:00)  T(F): 98.2 (13 Jun 2023 08:00), Max: 98.7 (12 Jun 2023 12:00)  HR: 108 (13 Jun 2023 08:55) (74 - 130)  BP: 107/68 (13 Jun 2023 08:55) (90/56 - 145/60)  BP(mean): 77 (13 Jun 2023 08:55) (66 - 88)  ABP: --  ABP(mean): --  RR: 21 (13 Jun 2023 08:55) (14 - 23)  SpO2: 96% (13 Jun 2023 08:55) (84% - 98%)    O2 Parameters below as of 13 Jun 2023 08:55  Patient On (Oxygen Delivery Method): nasal cannula  O2 Flow (L/min): 2    I&O's Summary    12 Jun 2023 07:01  -  13 Jun 2023 07:00  --------------------------------------------------------  IN: 240 mL / OUT: 3132 mL / NET: -2892 mL    13 Jun 2023 07:01  -  13 Jun 2023 11:15  --------------------------------------------------------  IN: 240 mL / OUT: 1050 mL / NET: -810 mL    PHYSICAL EXAM:Daily Height in cm: 185.42 (12 Jun 2023 00:25)    Daily   General: WN/WD NAD    HEENT:     + NCAT  + EOMI  - Conjuctival edema   - Icterus   - Thrush   - ETT  - NGT/OGT  Neck:         + FROM    + JVD     - Nodes     - Masses    + Mid-line trachea   - Tracheostomy  Chest:         - Sternal click  - Sternal drainage  + Pacing wires  + Chest tubes  - SubQ emphysema  Lungs:          + CTA   - Rhonchi    - Rales    - Wheezing     - Decreased BS   - Dullness R L  Cardiac:       + S1 + S2    + RRR   - Irregular   - S3  - S4    - Murmurs   - Rub   - Hamman’s sign   Abdomen:    + BS     + Soft    + Non-tender     - Distended    - Organomegaly  - PEG  Extremities:   - Cyanosis U/L   - Clubbing  U/L  - LE/UE Edema   + Capillary refill    + Pulses   Neuro:        + Awake   +  Alert   - Confused   - Lethargic   - Sedated   - Generalized Weakness  Skin:        - Rashes    - Erythema   + Normal incisions   + IV sites intact  - Sacral decubitus    MEDICATIONS  (STANDING):  acetaminophen     Tablet .. 975 milliGRAM(s) Oral every 6 hours  aMIOdarone    Tablet 200 milliGRAM(s) Oral daily  aspirin enteric coated 81 milliGRAM(s) Oral daily  atorvastatin 80 milliGRAM(s) Oral at bedtime  chlorhexidine 2% Cloths 1 Application(s) Topical <User Schedule>  enoxaparin Injectable 40 milliGRAM(s) SubCutaneous every 24 hours  furosemide    Tablet 40 milliGRAM(s) Oral daily  glucagon  Injectable 1 milliGRAM(s) IntraMuscular once  methocarbamol 500 milliGRAM(s) Oral three times a day  metoprolol tartrate 25 milliGRAM(s) Oral every 6 hours  pantoprazole    Tablet 40 milliGRAM(s) Oral daily  polyethylene glycol 3350 17 Gram(s) Oral daily  senna 2 Tablet(s) Oral at bedtime  sodium chloride 0.9% lock flush 3 milliLiter(s) IV Push every 8 hours    MEDICATIONS  (PRN):  oxyCODONE    IR 10 milliGRAM(s) Oral every 4 hours PRN Severe Pain (7 - 10)  oxyCODONE    IR 5 milliGRAM(s) Oral every 4 hours PRN Moderate Pain (4 - 6)      LABS: All Lab data reviewed and analyzed                        9.8    11.32 )-----------( 253      ( 13 Jun 2023 01:13 )             31.4   06-13    137  |  99  |  23.4<H>  ----------------------------<  109<H>  3.5   |  26.0  |  0.93    Ca    8.7      13 Jun 2023 01:13  Mg     2.0     06-13      I independently reviewed CXR overnight from today 06-13-23 @ 12:38 and ruled out effusion, PTX, or collapse of lung     Assessment: Respiratory insufficieny, hypervolemia, and AFIB-RVR     Plan:   - Respiratory status required supplemental oxygen the following of continuous pulse oximetry for support & to prevent decompensation  - Continued early mobilization as tolerated  - Patient requires deresuscitation for perioperative fluid administration and edema, started Lasix  - Addressed analgesic regimen to optimize function  - ASA continued for graft occlusion-thromboembolism prophylaxis  - Lipitor for long term graft patency  - Lovenox continued for VTE prophylaxis in addition to Venodyne boots, starting Eliquis for AFIB in AM  - Protonix maintained for GI bleeding prophylaxis  - Lopressor continued for atrial fibrillation prophylaxis now that he is in sinus rhythm    
Brief summary:  57yMale POD# 4 C3, [facility], BHARTI clip       Patient seen and examined. Notes, flowsheets, medications, radiologic images and labs reviewed.    SUBJECTIVE:  Pt in bed sleeping easily arousable, " Pt states, " Kimber been feeling sweaty all day"  pt c/o of incisional pain, pt denies SOB, palpitations, n/v/d     Overnight events:  no acute overnight events    PAST MEDICAL & SURGICAL HISTORY:  HTN (hypertension)      Afib      Osteoarthritis      History of right hip replacement      Obesity, morbid      S/P hip replacement, right          MEDICATIONS   acetaminophen     Tablet .. 975 milliGRAM(s) Oral every 6 hours  aMIOdarone    Tablet 200 milliGRAM(s) Oral daily  aspirin enteric coated 81 milliGRAM(s) Oral daily  atorvastatin 80 milliGRAM(s) Oral at bedtime  chlorhexidine 2% Cloths 1 Application(s) Topical <User Schedule>  enoxaparin Injectable 40 milliGRAM(s) SubCutaneous every 24 hours  furosemide    Tablet 40 milliGRAM(s) Oral daily  glucagon  Injectable 1 milliGRAM(s) IntraMuscular once  magnesium sulfate  IVPB 2 Gram(s) IV Intermittent once  methocarbamol 500 milliGRAM(s) Oral three times a day  metoprolol tartrate 100 milliGRAM(s) Oral two times a day  oxyCODONE    IR 5 milliGRAM(s) Oral every 4 hours PRN  oxyCODONE    IR 10 milliGRAM(s) Oral every 4 hours PRN  pantoprazole    Tablet 40 milliGRAM(s) Oral daily  polyethylene glycol 3350 17 Gram(s) Oral daily  potassium chloride   Powder 40 milliEquivalent(s) Oral every 4 hours  senna 2 Tablet(s) Oral at bedtime  sodium chloride 0.9% lock flush 3 milliLiter(s) IV Push every 8 hours  MEDICATIONS  (PRN):  oxyCODONE    IR 5 milliGRAM(s) Oral every 4 hours PRN Moderate Pain (4 - 6)  oxyCODONE    IR 10 milliGRAM(s) Oral every 4 hours PRN Severe Pain (7 - 10)      Daily                                 9.8    11.32 )-----------( 253      ( 13 Jun 2023 01:13 )             31.4   06-13    137  |  99  |  23.4<H>  ----------------------------<  109<H>  3.5   |  26.0  |  0.93    Ca    8.7      13 Jun 2023 01:13  Mg     2.0     06-13      CARDIAC MARKERS ( 13 Jun 2023 01:13 )  x     / 0.22 ng/mL / 342 U/L / x     / 2.7 ng/mL          Objective:  T(C): 36.8 (06-13-23 @ 00:00), Max: 37.1 (06-12-23 @ 08:00)  HR: 94 (06-13-23 @ 01:05) (71 - 120)  BP: 105/75 (06-13-23 @ 01:05) (92/55 - 130/75)  RR: 16 (06-13-23 @ 01:05) (15 - 23)  SpO2: 84% (06-13-23 @ 01:05) (84% - 96%)  Wt(kg): --CAPILLARY BLOOD GLUCOSE      POCT Blood Glucose.: 119 mg/dL (12 Jun 2023 21:17)  POCT Blood Glucose.: 123 mg/dL (12 Jun 2023 16:09)  POCT Blood Glucose.: 107 mg/dL (12 Jun 2023 11:13)  POCT Blood Glucose.: 89 mg/dL (12 Jun 2023 07:20)  I&O's Summary    11 Jun 2023 07:01  -  12 Jun 2023 07:00  --------------------------------------------------------  IN: 1360 mL / OUT: 2283 mL / NET: -923 mL    12 Jun 2023 07:01  -  13 Jun 2023 03:29  --------------------------------------------------------  IN: 120 mL / OUT: 2232 mL / NET: -2112 mL        Physical Exam  Neuro: A+O x 3, non-focal, speech clear and intact  Pulm: CTA, equal bilaterally, no accessory muscles utilized   CV: irregularly irregular, +S1S2  Abd: soft, NT, ND, +BS  Ext: +DP Pulses b/l, +PT pulses, trace edema  Inc: MSI C/D/I/stable w/ dressing, RVH C/D/I with Ace wrap  +PW       Imaging:  CXR:  < from: Xray Chest 1 View- PORTABLE-Urgent (Xray Chest 1 View- PORTABLE-Urgent .) (06.12.23 @ 10:49) >  IMPRESSION:  Stable minimal left apical pneumothorax following left chest   tube removal.    No significant change in left basilar/retrocardiac opacity, possibly a   small left pleural effusion with associated passive atelectasis.   Atelectasis of other cause and/or pneumonia in the appropriate clinical   context is not excluded.    --- End of Report ---    < end of copied text >      ECG:  < from: 12 Lead ECG (06.12.23 @ 04:37) >    Ventricular Rate 99 BPM    Atrial Rate 133 BPM    QRS Duration 72 ms    Q-T Interval 306 ms    QTC Calculation(Bazett) 392 ms    R Axis 38 degrees    T Axis 31 degrees    Diagnosis Line Atrial flutter with variable A-V block  Nonspecific ST and T wave abnormality  Abnormal ECG    Confirmed by CORAZON FLYNN ([id number]) on 6/12/2023 6:39:17 AM    < end of copied text >              
CRITICAL CARE ATTENDING - CTICU    MEDICATIONS  (STANDING):  aMIOdarone    Tablet 200 milliGRAM(s) Oral daily  aspirin enteric coated 81 milliGRAM(s) Oral daily  atorvastatin 80 milliGRAM(s) Oral at bedtime  cefuroxime  IVPB 1500 milliGRAM(s) IV Intermittent every 8 hours  cefuroxime  IVPB      chlorhexidine 2% Cloths 1 Application(s) Topical <User Schedule>  dextrose 50% Injectable 50 milliLiter(s) IV Push every 15 minutes  dextrose 50% Injectable 25 milliLiter(s) IV Push every 15 minutes  enoxaparin Injectable 40 milliGRAM(s) SubCutaneous every 24 hours  insulin regular Infusion 2 Unit(s)/Hr (2 mL/Hr) IV Continuous <Continuous>  metoclopramide Injectable 10 milliGRAM(s) IV Push every 8 hours  metoprolol tartrate 25 milliGRAM(s) Oral two times a day  niCARdipine Infusion 5 mG/Hr (25 mL/Hr) IV Continuous <Continuous>  pantoprazole    Tablet 40 milliGRAM(s) Oral daily  senna 2 Tablet(s) Oral at bedtime  sodium chloride 0.9%. 1000 milliLiter(s) (10 mL/Hr) IV Continuous <Continuous>  sodium chloride 0.9%. 1000 milliLiter(s) (5 mL/Hr) IV Continuous <Continuous>  vancomycin  IVPB 1500 milliGRAM(s) IV Intermittent every 12 hours                                    8.9    10.27 )-----------( 176      ( 10 Thanh 2023 02:00 )             27.9       06-10    139  |  108  |  22.7<H>  ----------------------------<  125<H>  4.3   |  19.0<L>  |  0.93    Ca    8.8      10 Thanh 2023 02:00  Mg     2.1     06-10    TPro  6.2<L>  /  Alb  3.4  /  TBili  0.2<L>  /  DBili  x   /  AST  46<H>  /  ALT  21  /  AlkPhos  105  06-10      PT/INR - ( 10 Thanh 2023 02:00 )   PT: 13.3 sec;   INR: 1.14 ratio         PTT - ( 10 Thanh 2023 02:00 )  PTT:19.3 sec    Mode: CPAP with PS  FiO2: 40  PEEP: 6  PC: 10      Daily Height in cm: 185.42 (10 Thanh 2023 00:58)    Daily Weight in k (10 Thanh 2023 04:36)       @ 07:01  -  06-10 @ 07:00  --------------------------------------------------------  IN: 900 mL / OUT: 1590 mL / NET: -690 mL        Critically Ill patient  : [ ] preoperative ,   [ x] post operative    Requires :  [x ] Arterial Line   [x ] Central Line  [ x] PA catheter - removed   [ ] IABP  [ ] ECMO  [ ] LVAD  [ ] Ventilator  [x ] pacemaker - TPM  [ ] Impella.                      [x ] ABG's     [x ] Pulse Oxymetry Monitoring  Bedside evaluation , monitoring , treatment of hemodynamics , fluids , IVP/ IVCD meds.        Diagnosis:     POD 1 - CABG X 3 L / LAAC     Hypovolemia     Hypotension a/w Hypertension, requiring intravenous medication.     Hemodynamic lability,  instability. Requires IVCD [ x] vasopressors - on/off [x ] inotropes - on/off   [ x] vasodilator  [ ]IVSS fluid  to maintain MAP, perfusion, C.I.     CHF- acute [x ]   chronic [x ]    systolic [x ]   diastolic [x ]  Valvular [ ]          - Echo- EF -  post op   30 - 40's            [ ] RV dysfunction          - Cxr-cardiomegally, edema          - Clinical-  [x ]inotropes weaned off    [x ]pressors  weaned off   [x ]diuresis   [ ]IABP   [ ]ECMO   [ ]LVAD   [ ]Respiratory Failure    Requires chest PT, pulmonary toilet,  suctioning to maintain SaO2,  patent airway and treat atelectasis.     Chest Tube Drainage / Management     Temporary pacemaker (TPM) interrogation and setting.     Lactic Acidosis - resolving     A Fib -> NSR     R Hip Osteo - multiple Infections     Requires bedside physical therapy, mobilization and total shelter care.                         -                     Discussed with CT surgeon, Physician's Assistant - Nurse Practitioner- Critical care medicine team.   Discussed at  AM / PM rounds.   Chart, labs , films reviewed.    Cumulative Critical Care Time Given Today : 30 min
Nurse Practitioner Progress note:   s/p C with Dr Sanchez; Verbal report:  ostial LAD 80-90%  mid LAD 80% and calcified  prox LCX 60%  1st OM small with diffuse disease  2nd OM 80%  Diag 80%    No intervention done.  CTS consult call to Dr Duke    Patient feels well.  Denies chest pain, shortness of breath, dizziness or palpitations at this time    Pt A&O x 3  Lungs CTA  S1S2 no MRG  Right radial hemoband in place.  Procedure site CDI, no bleeding, no hematoma, able to move all digits with capillary refill < 3 seconds, fingers warm      T(C): 36.8 (06-08-23 @ 08:04), Max: 36.8 (06-08-23 @ 08:04)  HR: 64 (06-08-23 @ 12:15) (63 - 75)  BP: 160/100 (06-08-23 @ 12:30) (136/93 - 160/100)  RR: 16 (06-08-23 @ 12:30) (15 - 26)  SpO2: 98% (06-08-23 @ 12:30) (97% - 99%)    CBC Full  -  ( 07 Jun 2023 07:57 )  WBC Count : 5.71 K/uL  RBC Count : 4.48 M/uL  Hemoglobin : 11.0 g/dL  Hematocrit : 35.0 %  Platelet Count - Automated : 255 K/uL  Mean Cell Volume : 78.1 fl  Mean Cell Hemoglobin : 24.6 pg  Mean Cell Hemoglobin Concentration : 31.4 gm/dL  Auto Neutrophil # : 3.81 K/uL  Auto Lymphocyte # : 1.12 K/uL  Auto Monocyte # : 0.56 K/uL  Auto Eosinophil # : 0.15 K/uL  Auto Basophil # : 0.04 K/uL  Auto Neutrophil % : 66.8 %  Auto Lymphocyte % : 19.6 %  Auto Monocyte % : 9.8 %  Auto Eosinophil % : 2.6 %  Auto Basophil % : 0.7 %    06-07    136  |  103  |  19.7  ----------------------------<  136<H>  4.2   |  23.0  |  1.00    Ca    9.3      07 Jun 2023 07:57    TPro  7.3  /  Alb  4.0  /  TBili  <0.2<L>  /  DBili  x   /  AST  20  /  ALT  16  /  AlkPhos  150<H>  06-07    CARDIAC MARKERS ( 08 Jun 2023 07:40 )  x     / <0.01 ng/mL / x     / x     / x              MEDICATIONS  (STANDING):  aspirin  chewable 81 milliGRAM(s) Oral daily  chlorhexidine 0.12% Liquid 15 milliLiter(s) Swish and Spit two times a day  chlorhexidine 4% Liquid 1 Application(s) Topical two times a day          HPI:  Narrative: This is a 56 yo male who had presented to the ED yesterday for c/o Chest discomfort described as a burning sensation mid sternal, dyspnea, palpitations and lightheadedness.  He has a PMHx of OA and within the past year has had a R hip replacement with 6 revisions secondary to persistent infection. He had been on home IV abx via a PICC line for 4 weeks and again for 6 weeks.  He has also been diagnosed with Afib and HTN within the past year.      (08 Jun 2023 07:33)    now s/p LHC which revealed multivessel disease. Patient seen by Dr Duke and is scheduled for CTS tomorrow.        ASSESSMENT/PLAN:    Coronary artery disease  -Recover patient for 3 hours  -Resume cardiac diet  -Ambulate patient p 2 hours  -Follow-up with attending cardiologist Dr Sanchez  within 1 month  
Subjective - patient seen and evaluated bedside. Sitting comfortably in bed. Denies CP, SOB, HA, dizziness, n/v/d    Review of Systems: negative x 10 systems except as noted above    Brief summary:  57yMale POD# 3 CABGx3    Significant/Afcx25ew events: afib w/ RVR. brief tachy/cammie. lopressor increased, dig load initiated. mediastinal chest tube removed.       PAST MEDICAL & SURGICAL HISTORY:  HTN (hypertension)      Afib      Osteoarthritis      History of right hip replacement      Obesity, morbid      S/P hip replacement, right            aMIOdarone    Tablet 200 milliGRAM(s) Oral daily  aspirin enteric coated 81 milliGRAM(s) Oral daily  atorvastatin 80 milliGRAM(s) Oral at bedtime  chlorhexidine 2% Cloths 1 Application(s) Topical <User Schedule>  dextrose 5%. 1000 milliLiter(s) IV Continuous <Continuous>  dextrose 5%. 1000 milliLiter(s) IV Continuous <Continuous>  dextrose 50% Injectable 25 Gram(s) IV Push once  dextrose 50% Injectable 50 milliLiter(s) IV Push every 15 minutes  dextrose 50% Injectable 25 Gram(s) IV Push once  dextrose 50% Injectable 25 milliLiter(s) IV Push every 15 minutes  dextrose 50% Injectable 12.5 Gram(s) IV Push once  dextrose Oral Gel 15 Gram(s) Oral once PRN  digoxin  Injectable 250 MICROGram(s) IV Push once  digoxin  Injectable 250 MICROGram(s) IV Push once  enoxaparin Injectable 40 milliGRAM(s) SubCutaneous every 24 hours  gabapentin 300 milliGRAM(s) Oral every 8 hours  glucagon  Injectable 1 milliGRAM(s) IntraMuscular once  insulin lispro (ADMELOG) corrective regimen sliding scale   SubCutaneous three times a day before meals  insulin lispro (ADMELOG) corrective regimen sliding scale   SubCutaneous at bedtime  methocarbamol 500 milliGRAM(s) Oral every 6 hours  metoprolol tartrate 100 milliGRAM(s) Oral two times a day  oxyCODONE    IR 5 milliGRAM(s) Oral every 4 hours PRN  oxyCODONE    IR 10 milliGRAM(s) Oral every 4 hours PRN  pantoprazole    Tablet 40 milliGRAM(s) Oral daily  polyethylene glycol 3350 17 Gram(s) Oral daily  senna 2 Tablet(s) Oral at bedtime  sodium chloride 0.9%. 1000 milliLiter(s) IV Continuous <Continuous>  sodium chloride 0.9%. 1000 milliLiter(s) IV Continuous <Continuous>  MEDICATIONS  (PRN):  dextrose Oral Gel 15 Gram(s) Oral once PRN Blood Glucose LESS THAN 70 milliGRAM(s)/deciliter  oxyCODONE    IR 10 milliGRAM(s) Oral every 4 hours PRN Severe Pain (7 - 10)  oxyCODONE    IR 5 milliGRAM(s) Oral every 4 hours PRN Moderate Pain (4 - 6)    Height (cm): 185.4 ( @ 01:08)  Weight (kg): 119.3 ( @ 01:08)  BMI (kg/m2): 34.7 ( @ 01:08)  BSA (m2): 2.42 ( 01:08)  Daily Height in cm: 185.42 (2023 01:08)    Daily Weight in k.9 (2023 04:16)                              8.6    16.79 )-----------( 200      ( 2023 02:00 )             27.7       137  |  104  |  22.3<H>  ----------------------------<  110<H>  4.7   |  24.0  |  0.82    Ca    8.6      2023 02:00  Mg     2.8         TPro  6.2<L>  /  Alb  3.4  /  TBili  0.2<L>  /  DBili  x   /  AST  46<H>  /  ALT  21  /  AlkPhos  105  06-10    CARDIAC MARKERS ( 10 Thanh 2023 02:00 )  x     / 0.53 ng/mL / 508 U/L / x     / 21.2 ng/mL    PT/INR - ( 10 Thanh 2023 02:00 )   PT: 13.3 sec;   INR: 1.14 ratio         PTT - ( 10 Thanh 2023 02:00 )  PTT:19.3 sec      Objective:  T(C): 37 (23 @ 00:00), Max: 37 (23 @ 00:00)  HR: 70 (23 @ 23:00) (59 - 140)  BP: 104/61 (23 @ 23:00) (99/58 - 164/87)  RR: 17 (23 @ 23:00) (12 - 30)  SpO2: 96% (23 @ 23:00) (93% - 99%)  Wt(kg): --CAPILLARY BLOOD GLUCOSE      POCT Blood Glucose.: 141 mg/dL (2023 20:54)  POCT Blood Glucose.: 123 mg/dL (2023 17:15)  POCT Blood Glucose.: 104 mg/dL (2023 15:58)  POCT Blood Glucose.: 114 mg/dL (2023 15:20)  POCT Blood Glucose.: 125 mg/dL (2023 14:16)  POCT Blood Glucose.: 126 mg/dL (2023 13:13)  POCT Blood Glucose.: 162 mg/dL (2023 12:34)  POCT Blood Glucose.: 129 mg/dL (2023 11:09)  POCT Blood Glucose.: 110 mg/dL (2023 10:14)  POCT Blood Glucose.: 140 mg/dL (2023 09:09)  POCT Blood Glucose.: 116 mg/dL (2023 08:15)  POCT Blood Glucose.: 120 mg/dL (2023 05:58)  POCT Blood Glucose.: 121 mg/dL (2023 05:02)  POCT Blood Glucose.: 120 mg/dL (2023 03:56)  POCT Blood Glucose.: 106 mg/dL (2023 01:59)  I&O's Summary    10 Thanh 2023 07:  -  2023 07:00  --------------------------------------------------------  IN: 2481.8 mL / OUT: 3870 mL / NET: -1388.2 mL    2023 07:01  -  2023 00:26  --------------------------------------------------------  IN: 1300 mL / OUT: 1758 mL / NET: -458 mL        Physical Exam  General: NAD  Neuro: A+O x 3, non-focal, speech clear and intact  Psych: Appropriate affect  HEENT:  NCAT, No conjuctival edema or icterus, no thrush.  Pulm: CTA, equal bilaterally  CV:  irregularly irregular, +S1S2  Abd: soft, NT, ND, +BS  Ext: +DP Pulses b/l, no edema  Skin: Warm, dry, intact  Inc: MSI C/D/I/stable w/ dressing, LE vein harvest site C/D/I with Ace wrap  Chest tubes: left chest tube to suction, draining appropriately, no AL.         Imaging:  < from: Xray Chest 1 View- PORTABLE-Routine (23 @ 07:52) >  INTERPRETATION:    Heart size and the mediastinum cannot be accurately evaluated on this   projection. Median sternotomy sutures, surgical clips, and left atrial   appendage clip again seen.  Right IJ sheath with tip in the distal right IJ vein or proximal right   brachiocephalic vein. Mediastinal drain, unchanged in position.  Elevated right hemidiaphragm.  The lungs are clear.  No pleural effusion or pneumothorax is seen.  There is osteoarthritic degenerative change of the spine. Bony deformity   of the distal right clavicle and incompletely imaged bony deformity of   the right humerus, possibly secondary to trauma is seen.      IMPRESSION:  Right IJ sheath and mediastinal drain as above.    < end of copied text >                
  POD #7 s/p CABG x 3 (LIMA-LAD, Vein-OM, Diag), Encompass RF Ablation, with 45mm Atriclip    PAST MEDICAL & SURGICAL HISTORY:  HTN (hypertension)  Afib  Osteoarthritis  History of right hip replacement  Obesity, morbid  S/P hip replacement, right    FAMILY HISTORY:  No pertinent family history in first degree relatives    Brief Hospital Course: 57 year old male daily Marijuana smoker with PMHx Afib, HTN, osteoarthritis, Right hip replacement which subsequently required 6 revisions (received IV ABX via PICC line completed approx 3/2023), presented to [facility] ED on 6/7/23 with unstable angina. Pt had a LHC on 6/8/23 with Dr. Sanchez which revealed Multivesssel CAD. Patient electively admitted on 6/8/23 and underwent CABG x 3, Encompass procedure, and BHARTI clipping on 6/9/23 with Dr. Duke. Post-op course complicated by afib RVR (now converted to NSR with PO Amio load).     Significant recent/past 24 hr events: No overnight events reported. Remains NSR on telemetry.     Subjective: Patient lying in bed in NAD. +Tolerating diet. +Passing BMs since surgery. +Pain currently controlled. Denies fevers, chills, lightheadedness, dizziness, HA, CP, palpitations, SOB, cough, abdominal pain, N/V, diarrhea, numbness/tingling in extremities, or any other acute complaints. ROS negative x 10 systems except as noted above.    MEDICATIONS  (STANDING):  acetaminophen     Tablet .. 975 milliGRAM(s) Oral every 6 hours  aMIOdarone    Tablet 400 milliGRAM(s) Oral every 8 hours  apixaban 5 milliGRAM(s) Oral every 12 hours  aspirin enteric coated 81 milliGRAM(s) Oral daily  atorvastatin 80 milliGRAM(s) Oral at bedtime  melatonin 3 milliGRAM(s) Oral at bedtime  methocarbamol 500 milliGRAM(s) Oral three times a day  metoprolol succinate  milliGRAM(s) Oral daily  pantoprazole    Tablet 40 milliGRAM(s) Oral daily  polyethylene glycol 3350 17 Gram(s) Oral daily  senna 2 Tablet(s) Oral at bedtime  sodium chloride 0.9% lock flush 3 milliLiter(s) IV Push every 8 hours    Allergies: No Known Allergies    Vitals   T(C): 36.6 (16 Jun 2023 00:02), Max: 36.9 (15 Thanh 2023 05:54)  T(F): 97.9 (16 Jun 2023 00:02), Max: 98.4 (15 Thanh 2023 05:54)  HR: 67 (16 Jun 2023 00:02) (60 - 70)  BP: 145/76 (16 Jun 2023 00:02) (116/74 - 155/84)  BP(mean): 108 (15 Thanh 2023 17:34) (100 - 108)  RR: 18 (16 Jun 2023 00:02) (17 - 18)  SpO2: 95% (16 Jun 2023 00:02) (95% - 100%)  O2 Parameters below as of 16 Jun 2023 00:02  Patient On (Oxygen Delivery Method): room air    I&O's Detail    14 Jun 2023 07:01  -  15 Thanh 2023 07:00  --------------------------------------------------------  IN:    Oral Fluid: 200 mL  Total IN: 200 mL    OUT:    Voided (mL): 2500 mL  Total OUT: 2500 mL    Total NET: -2300 mL      15 Thanh 2023 07:01  -  16 Jun 2023 02:58  --------------------------------------------------------  IN:    Oral Fluid: 195 mL  Total IN: 195 mL    OUT:    Voided (mL): 800 mL  Total OUT: 800 mL    Total NET: -605 mL    Physical Exam  Neuro: A+O x 3, non-focal, speech clear and intact  HEENT:  NCAT, No conjuctival edema or icterus, no thrush.    Neck:  Supple, trachea midline  Pulm: CTA bilaterally, no accessory muscle use noted  Chest: +PW (isolated)  CV: regular rate, regular rhythm, +S1S2, no murmur or rub noted  Abd: soft, NT, ND, + BS  Ext: JARA x 4, trace edema, no cyanosis, distal motor/neuro/circ intact  Skin: warm, dry, perfused  Incisions: midsternal incision open to air C/D/I, sternum stable, RLE open to air C/D/I    LABS                        9.3    12.25 )-----------( 393      ( 15 Thanh 2023 05:15 )             30.0     06-15    137  |  101  |  19.2  ----------------------------<  119<H>  4.5   |  24.0  |  0.87    Ca    8.9      15 Thanh 2023 05:15  Mg     1.9     06-15    Last CXR:  < from: Xray Chest 1 View- PORTABLE-Routine (Xray Chest 1 View- PORTABLE-Routine in AM.) (06.15.23 @ 05:12) >  IMPRESSION:  Enlarged cardiac silhouette.  Unchanged left lower/retrocardiac opacity which could be due to a left pleural effusion with associated passive atelectasis, atelectasis of other cause, and/or other pathology including, but not limited to, pneumonia.  < end of copied text >    TTE:  < from: TTE Echo Limited or F/U (06.15.23 @ 10:40) >  Summary:   1. Left ventricular ejection fraction, by visual estimation, is 60 to 65%.   2. Normal global left ventricular systolic function.   3. Normal right ventricular size and function.   4. There is no evidence of pericardial effusion.   5. Trace mitral valve regurgitation.   6. Sclerotic aortic valve with normal opening.  MD Gato Electronically signed on 6/15/2023 at 11:57:49 AM  < end of copied text >
ARSLAN BARBOSA  MRN-[id number]    HPI:  57yMale daily Marijuana smoker with PMHx Afib, HTN, osteoarthritis, presented to [facility] ED on 23  c/o mid sternal exertional chest pain described as a burning sensation with associated dyspnea, palpitations, and lightheadedness. Pt reports he had a R hip replacement which subsequently required 6 revisions, the most recent being 8 weeks ago. He has been recovering and rehabilitating from surgery, beginning to walk longer distances, with exertion he has been experiencing chest pain and dyspnea. Pt had a LHC on 23 with Dr. Sanchez which revealed MVD. Pt was admitted on 2023 for pre-op CABG with Dr. Duke, with plan for OR on 23.-      Surgery/Hospital Course:  s/p LHC with Dr Sanchez;   ostial LAD 80-90%  mid LAD 80% and calcified  prox LCX 60%  1st OM small with diffuse disease  2nd OM 80%  Diag 80%-  Today:  No acute events     ICU Vital Signs Last 24 Hrs  T(C): 36.4 (2023 17:01), Max: 36.8 (2023 08:04)  T(F): 97.6 (2023 17:01), Max: 98.3 (2023 08:04)  HR: 75 (2023 17:00) (63 - 85)  BP: 148/91 (2023 17:00) (125/83 - 160/100)  BP(mean): 112 (2023 17:00) (107 - 112)  ABP: --  ABP(mean): --  RR: 21 (2023 17:00) (14 - 26)  SpO2: 96% (2023 17:00) (93% - 99%)    O2 Parameters below as of 2023 16:20  Patient On (Oxygen Delivery Method): room air            Physical Exam:  Gen: A&O   CNS: non focal 	  Neck: no JVD  RES : clear , no wheezing              CVS: Regular  rhythm. Normal S1/S2  Abd: Soft, non-distended. Bowel sounds present.  Skin: No rash.  Ext:  no edema    ============================I/O===========================   I&O's Detail    ============================ LABS =========================                        10.6   2.85  )-----------( 191      ( 2023 14:39 )             33.6     06-08    140  |  105  |  14.0  ----------------------------<  116<H>  3.6   |  24.0  |  0.94    Ca    8.8      2023 14:39    TPro  7.0  /  Alb  3.7  /  TBili  0.2<L>  /  DBili  x   /  AST  20  /  ALT  17  /  AlkPhos  144<H>  0608    LIVER FUNCTIONS - ( 2023 14:39 )  Alb: 3.7 g/dL / Pro: 7.0 g/dL / ALK PHOS: 144 U/L / ALT: 17 U/L / AST: 20 U/L / GGT: x           PT/INR - ( 2023 14:39 )   PT: 12.8 sec;   INR: 1.10 ratio         PTT - ( 2023 14:39 )  PTT:29.6 sec    Urinalysis Basic - ( 2023 15:39 )    Color: Yellow / Appearance: Clear / S.015 / pH: x  Gluc: x / Ketone: Negative  / Bili: Negative / Urobili: Negative mg/dL   Blood: x / Protein: Negative / Nitrite: Negative   Leuk Esterase: Negative / RBC: 0-2 /HPF / WBC 0-2 /HPF   Sq Epi: x / Non Sq Epi: x / Bacteria: Occasional      ======================Micro/Rad/Cardio=================  Culture: Reviewed   CXR: Reviewed  Echo:Reviewed  ======================================================  PAST MEDICAL & SURGICAL HISTORY:  HTN (hypertension)      Afib      Osteoarthritis      History of right hip replacement      Obesity, morbid      S/P hip replacement, right        ====================ASSESSMENT ==============  This is a 58 yo male who had presented to the ED yesterday for c/o Chest discomfort described as a burning sensation mid sternal, dyspnea, palpitations and lightheadedness.  He has a PMHx of OA and within the past year has had a R hip replacement with 6 revisions secondary to persistent infection. He had been on home IV abx via a PICC line for 4 weeks and again for 6 weeks.  He has also been diagnosed with Afib and HTN within the past year.  -     --- Coronary artery disease with unstable angina pectoris.   ---Pt with MVD on recent [facility] 2023,   --- Afib.   --- HTN (hypertension).   ---Obesity    Plan:  -NPO after midnight  -continue ASA   -Metoprolol 100mg BID in pre op setting  - resume home dose Diltiazem.       ====================== NEUROLOGY=====================  intact  ==================== RESPIRATORY======================  RA    ====================CARDIOVASCULAR==================  diltiazem    Tablet 60 milliGRAM(s) Oral every 6 hours  metoprolol tartrate 100 milliGRAM(s) Oral every 12 hours    ===================HEMATOLOGIC/ONC ===================  Monitor H&H/Plts    aspirin  chewable 81 milliGRAM(s) Oral daily    ===================== RENAL =========================  Continue monitoring urine output, I&OS, BUN/Cr     ==================== GASTROINTESTINAL===================  pantoprazole    Tablet 40 milliGRAM(s) Oral before breakfast  sodium chloride 0.9% lock flush 3 milliLiter(s) IV Push every 8 hours    =======================    ENDOCRINE  =====================  atorvastatin 80 milliGRAM(s) Oral at bedtime    ========================INFECTIOUS DISEASE================      -Monitor Neurologic status ,   -Head of the bed should remain elevated to 45 degrees,  -Monitor for arrhythmias and monitor parameters for organ perfusion,  -Glycemic control is satisfactory,  -Nutritional goals will be met using po eventually , insure adequate caloric intake and monitor the same ,  -Electrolytes have been repleted as necessary , pain control has been achieved  and wound care has been carried out ,  -Stress ulcer and VTE prophylaxis will be achieved,    I have spent 35 minutes providing acute care for this critically ill patient     Patient requires continuous monitoring with bedside rhythm monitoring, pulse ox monitoring, and intermittent blood gas analysis. Care plan discussed with ICU care team. Patient remained critical and at risk for life threatening decompensation.           
CRITICAL CARE ATTENDING - CTICU    MEDICATIONS  (STANDING):  cefuroxime  IVPB 1500 milliGRAM(s) IV Intermittent once  chlorhexidine 0.12% Liquid 15 milliLiter(s) Oral Mucosa every 12 hours  chlorhexidine 2% Cloths 1 Application(s) Topical <User Schedule>  dextrose 50% Injectable 50 milliLiter(s) IV Push every 15 minutes  dextrose 50% Injectable 25 milliLiter(s) IV Push every 15 minutes  insulin regular Infusion 2 Unit(s)/Hr (2 mL/Hr) IV Continuous <Continuous>  pantoprazole  Injectable 40 milliGRAM(s) IV Push once  potassium chloride  10 mEq/50 mL IVPB 10 milliEquivalent(s) IV Intermittent every 1 hour  potassium chloride  10 mEq/50 mL IVPB 10 milliEquivalent(s) IV Intermittent every 1 hour  potassium chloride  10 mEq/50 mL IVPB 10 milliEquivalent(s) IV Intermittent every 1 hour  sodium chloride 0.9%. 1000 milliLiter(s) (10 mL/Hr) IV Continuous <Continuous>  sodium chloride 0.9%. 1000 milliLiter(s) (5 mL/Hr) IV Continuous <Continuous>  vancomycin  IVPB 1500 milliGRAM(s) IV Intermittent once                                    10.7   4.22  )-----------( 207      ( 2023 02:15 )             35.2           137  |  102  |  16.6  ----------------------------<  98  4.1   |  22.0  |  0.85    Ca    8.8      2023 02:15  Mg     2.2         TPro  7.0  /  Alb  3.7  /  TBili  0.2<L>  /  DBili  x   /  AST  20  /  ALT  17  /  AlkPhos  144<H>        PT/INR - ( 2023 14:39 )   PT: 12.8 sec;   INR: 1.10 ratio         PTT - ( 2023 14:39 )  PTT:29.6 sec        Daily Height in cm: 185.4 (2023 22:57)    Daily Weight in k.1 (2023 04:11)      -08 @ 07:01  -  06- @ 07:00  --------------------------------------------------------  IN: 480 mL / OUT: 1250 mL / NET: -770 mL        Critically Ill patient  : [ ] preoperative ,   [x ] post operative    Requires :  [x ] Arterial Line   [x ] Central Line  [ ] PA catheter  [ ] IABP  [ ] ECMO  [ ] LVAD  [ x] Ventilator  [x] pacemaker - TPM  [ ] Impella.                      [x ] ABG's     [ x] Pulse Oxymetry Monitoring  Bedside evaluation , monitoring , treatment of hemodynamics , fluids , IVP/ IVCD meds.        Diagnosis:     Op Day -  CABG X 3 L / LAAC     Hypotension     Hemodynamic lability,  instability. Requires IVCD [ x] vasopressors [x ] inotropes  [ ] vasodilator  [x ]IVSS fluid  to maintain MAP, perfusion, C.I.     CHF- acute [ x]   chronic [x ]    systolic [x ]   diastolic [x ]  Valvular [ ]          - Echo- EF -  30's / LVH            [x ] RV  Enlarged          - Cxr-cardiomegally, edema          - Clinical-  [ x]inotropes   [x ]pressors   [ ]diuresis   [ ]IABP   [ ]ECMO   [ ]LVAD   [ ]Respiratory Failure    Temporary pacemaker (TPM) interrogation and setting.     Bradycardia    Requires  [ ]DDD  [ x]VVI    [ ]AII  temporary pacing at  80 min    to maintain HR, MAP, CI, and perfusion.     A Fib     Chest Tube Drainage / Management     Obesity OHS / BRANDEN     [place] Neda catheter interpretation and therapeutic management of unstable hemodynamics     IVCD Insulin     Requires bedside physical therapy, mobilization and total group home care.     Chronic L Hip Infections                     -                     Discussed with CT surgeon, Physician's Assistant - Nurse Practitioner- Critical care medicine team.   Discussed at  AM / PM rounds.   Chart, labs , films reviewed.    Cumulative Critical Care Time Given Today : 30 min
INTERVAL EVENTS:  Follow up for stress hyperglycemia post op, now resolved    ROS: Denies chest pain, sob, abd pain.    MEDICATIONS  (STANDING):  acetaminophen     Tablet .. 975 milliGRAM(s) Oral every 6 hours  aMIOdarone    Tablet 200 milliGRAM(s) Oral daily  aspirin enteric coated 81 milliGRAM(s) Oral daily  atorvastatin 80 milliGRAM(s) Oral at bedtime  chlorhexidine 2% Cloths 1 Application(s) Topical <User Schedule>  dextrose 5%. 1000 milliLiter(s) (50 mL/Hr) IV Continuous <Continuous>  dextrose 5%. 1000 milliLiter(s) (100 mL/Hr) IV Continuous <Continuous>  dextrose 50% Injectable 25 Gram(s) IV Push once  dextrose 50% Injectable 50 milliLiter(s) IV Push every 15 minutes  dextrose 50% Injectable 25 milliLiter(s) IV Push every 15 minutes  dextrose 50% Injectable 12.5 Gram(s) IV Push once  dextrose 50% Injectable 25 Gram(s) IV Push once  enoxaparin Injectable 40 milliGRAM(s) SubCutaneous every 24 hours  furosemide    Tablet 40 milliGRAM(s) Oral daily  gabapentin 300 milliGRAM(s) Oral every 8 hours  glucagon  Injectable 1 milliGRAM(s) IntraMuscular once  insulin lispro (ADMELOG) corrective regimen sliding scale   SubCutaneous three times a day before meals  insulin lispro (ADMELOG) corrective regimen sliding scale   SubCutaneous at bedtime  methocarbamol 500 milliGRAM(s) Oral three times a day  metoprolol tartrate 100 milliGRAM(s) Oral two times a day  pantoprazole    Tablet 40 milliGRAM(s) Oral daily  polyethylene glycol 3350 17 Gram(s) Oral daily  potassium chloride   Powder 20 milliEquivalent(s) Oral daily  senna 2 Tablet(s) Oral at bedtime  sodium chloride 0.9% lock flush 3 milliLiter(s) IV Push every 8 hours  sodium chloride 0.9%. 1000 milliLiter(s) (10 mL/Hr) IV Continuous <Continuous>  sodium chloride 0.9%. 1000 milliLiter(s) (5 mL/Hr) IV Continuous <Continuous>    MEDICATIONS  (PRN):  dextrose Oral Gel 15 Gram(s) Oral once PRN Blood Glucose LESS THAN 70 milliGRAM(s)/deciliter  oxyCODONE    IR 5 milliGRAM(s) Oral every 4 hours PRN Moderate Pain (4 - 6)  oxyCODONE    IR 10 milliGRAM(s) Oral every 4 hours PRN Severe Pain (7 - 10)    Allergies  No Known Allergies    Vital Signs Last 24 Hrs  T(C): 37.1 (12 Jun 2023 12:00), Max: 37.1 (12 Jun 2023 08:00)  T(F): 98.7 (12 Jun 2023 12:00), Max: 98.7 (12 Jun 2023 08:00)  HR: 84 (12 Jun 2023 13:30) (70 - 140)  BP: 102/58 (12 Jun 2023 13:30) (99/58 - 132/66)  BP(mean): 75 (12 Jun 2023 13:30) (75 - 105)  RR: 22 (12 Jun 2023 13:30) (15 - 30)  SpO2: 93% (12 Jun 2023 13:30) (91% - 96%)    Parameters below as of 12 Jun 2023 13:30  Patient On (Oxygen Delivery Method): room air      PHYSICAL EXAM:  General: No apparent distress  Neck: Supple, trachea midline, no thyromegaly  Respiratory: Lungs clear bilaterally, normal rate, effort  Cardiac: +S1, S2, no m/r/g  GI: +BS, soft, non tender, non distended  Extremities: No peripheral edema, no pedal lesions  Neuro: A+O X3, no tremor      LABS:                        8.4    13.86 )-----------( 200      ( 12 Jun 2023 02:10 )             27.0     06-12    138  |  102  |  22.8<H>  ----------------------------<  93  4.0   |  26.0  |  0.97    Ca    8.3<L>      12 Jun 2023 02:10  Mg     2.1     06-12      POCT Blood Glucose.: 107 mg/dL (06-12-23 @ 11:13)  POCT Blood Glucose.: 89 mg/dL (06-12-23 @ 07:20)  POCT Blood Glucose.: 141 mg/dL (06-11-23 @ 20:54)  POCT Blood Glucose.: 123 mg/dL (06-11-23 @ 17:15)  POCT Blood Glucose.: 104 mg/dL (06-11-23 @ 15:58)  POCT Blood Glucose.: 114 mg/dL (06-11-23 @ 15:20)  POCT Blood Glucose.: 125 mg/dL (06-11-23 @ 14:16)    Thyroid Stimulating Hormone, Serum: 3.09 uIU/mL (06-08-23 @ 18:30)  
Pt seen sitting in bedside chair. Expressing frustration over prolonged hospitalization. Denies chest pain, palpitation.   Telemetry shows pt converted to sinus rhythm @ 12:35PM yesterday. Remains in sinus w/ rates in the 70s.     PAST MEDICAL & SURGICAL HISTORY:  HTN (hypertension)  Afib  Osteoarthritis  History of right hip replacement  Obesity, morbid  S/P hip replacement, right    MEDICATIONS  (STANDING):  acetaminophen     Tablet .. 975 milliGRAM(s) Oral every 6 hours  aMIOdarone    Tablet   Oral   aMIOdarone    Tablet 400 milliGRAM(s) Oral every 8 hours  apixaban 5 milliGRAM(s) Oral every 12 hours  aspirin enteric coated 81 milliGRAM(s) Oral daily  atorvastatin 80 milliGRAM(s) Oral at bedtime  glucagon  Injectable 1 milliGRAM(s) IntraMuscular once  melatonin 3 milliGRAM(s) Oral at bedtime  methocarbamol 500 milliGRAM(s) Oral three times a day  metoprolol tartrate 25 milliGRAM(s) Oral every 6 hours  pantoprazole    Tablet 40 milliGRAM(s) Oral daily  polyethylene glycol 3350 17 Gram(s) Oral daily  senna 2 Tablet(s) Oral at bedtime  sodium chloride 0.9% lock flush 3 milliLiter(s) IV Push every 8 hours    MEDICATIONS  (PRN):    Allergies:  No Known Allergies    Vital Signs Last 24 Hrs  T(C): 36.7 (15 Thanh 2023 08:17), Max: 36.9 (15 Thanh 2023 05:54)  T(F): 98 (15 Thanh 2023 08:17), Max: 98.4 (15 Thanh 2023 05:54)  HR: 66 (15 Thanh 2023 08:17) (66 - 133)  BP: 116/74 (15 Thanh 2023 08:17) (99/70 - 148/83)  RR: 18 (15 Thanh 2023 08:17) (17 - 18)  SpO2: 98% (15 Thanh 2023 08:17) (95% - 99%)    Parameters below as of 15 Thanh 2023 08:17  Patient On (Oxygen Delivery Method): room air    Physical Exam:  Constitutional: NAD, AAOx3  Cardiovascular: +S1S2 RRR; mid sternal surgical site healing   Pulmonary: CTA b/l, unlabored  GI: soft NTND +BS  Extremities: no pedal edema, +distal pulses b/l  Neuro: non focal, JARA x4    LABS:                        9.3    12.25 )-----------( 393      ( 15 Thanh 2023 05:15 )             30.0     06-15    137  |  101  |  19.2  ----------------------------<  119<H>  4.5   |  24.0  |  0.87    Ca    8.9      15 Thanh 2023 05:15  Mg     1.9     06-15    RADIOLOGY & ADDITIONAL TESTS:  Echo: 6/3/23: (results obtained from [employer])  EF 55-60%, hypermobile interatrial septum, aortic root 4.3cm     NST: 6/16/22: (results obtained from [employer])   small fixed defect involving mid inferior wall, normal wall motion on gated study, normal EF     CT angio chest: 6/7/23:   INTERPRETATION:  Clinical information: Positive d-dimer levels. Evaluate for pulmonary embolus.  CT angiogram of the chest was obtained following administration of intravenous contrast. Approximately 70 cc of Omnipaque 350 was administered and 30 cc was discarded. Coronal, sagittal and MIP images were submitted for review.  No hilar and or mediastinal adenopathy is noted.  Heart is enlarged in size. Calcification of the aortic valve and the coronary arteries is noted. No pericardial effusion is noted. Pulmonary arteries are normal in caliber. No filling defects are noted.  No endobronchial lesions arenoted. Saber-sheath appearance of the trachea is noted. Lungs are clear. No pleural effusions are noted.  Below the diaphragm, visualized portions of the abdomen are unremarkable.  Degenerative changes of the spine are noted.  IMPRESSION: No pulmonary embolus is noted.  --- End of Report ---  HIREN BARLOW MD; Attending Radiologist  This document has been electronically signed. Jun 7 2023  9:49AM    Cardiac cath: 6/8/23:   Diagnostic Conclusions:   Right dominant coronary system There is significant 2 vessel CAD involving the ostial to mid LAD and proximal LCX LAD found to be significant and heavily calcified with IVUS investigation.  Recommendations:   - CT surgical evaluation for CABG vs high risk PCI   - Cont. ASA-81 daily   - High intensity statin therapy       
Subjective - patient seen and evaluated bedside. Sitting comfortably in bed. Denies CP, SOB, HA, dizziness, n/v/d    Review of Systems: negative x 10 systems except as noted above    Brief summary:  57yMale POD# 1 CABGx3, encompass procedure, BHARTI clip    Significant/Eeys27rm events: as above, extubated      PAST MEDICAL & SURGICAL HISTORY:  HTN (hypertension)      Afib      Osteoarthritis      History of right hip replacement      Obesity, morbid      S/P hip replacement, right            aspirin enteric coated 81 milliGRAM(s) Oral daily  atorvastatin 80 milliGRAM(s) Oral at bedtime  cefuroxime  IVPB 1500 milliGRAM(s) IV Intermittent every 8 hours  cefuroxime  IVPB      chlorhexidine 2% Cloths 1 Application(s) Topical <User Schedule>  dextrose 50% Injectable 50 milliLiter(s) IV Push every 15 minutes  dextrose 50% Injectable 25 milliLiter(s) IV Push every 15 minutes  insulin regular Infusion 2 Unit(s)/Hr IV Continuous <Continuous>  niCARdipine Infusion 5 mG/Hr IV Continuous <Continuous>  pantoprazole    Tablet 40 milliGRAM(s) Oral daily  senna 2 Tablet(s) Oral at bedtime  sodium chloride 0.9%. 1000 milliLiter(s) IV Continuous <Continuous>  sodium chloride 0.9%. 1000 milliLiter(s) IV Continuous <Continuous>  vancomycin  IVPB 1500 milliGRAM(s) IV Intermittent every 12 hours  MEDICATIONS  (PRN):    Mode: CPAP with PS, FiO2: 40, PEEP: 6, PC: 10  Daily     Daily Weight in k.1 (2023 04:11)      ABG - ( 2023 20:35 )  pH, Arterial: 7.350 pH, Blood: x     /  pCO2: 32    /  pO2: 93    / HCO3: 18    / Base Excess: -7.9  /  SaO2: 99.2                                    10.1   15.75 )-----------( 245      ( 2023 13:50 )             32.0   06-09    140  |  107  |  17.3  ----------------------------<  184<H>  4.5   |  17.0<L>  |  0.90    Ca    8.6      2023 13:50  Mg     2.5     06-    TPro  6.0<L>  /  Alb  3.3  /  TBili  0.7  /  DBili  x   /  AST  47<H>  /  ALT  21  /  AlkPhos  117  06-09    CARDIAC MARKERS ( 2023 13:50 )  x     / 0.41 ng/mL / 273 U/L / x     / 20.9 ng/mL  CARDIAC MARKERS ( 2023 07:40 )  x     / <0.01 ng/mL / x     / x     / x        PT/INR - ( 2023 13:50 )   PT: 14.7 sec;   INR: 1.26 ratio         PTT - ( 2023 13:50 )  PTT:29.9 sec      Objective:  T(C): 37 (06-10-23 @ 00:00), Max: 37.2 (23 @ 22:00)  HR: 58 (06-10-23 @ 00:15) (52 - 80)  BP: 109/59 (06-10-23 @ 00:00) (109/59 - 150/83)  RR: 20 (06-10-23 @ 00:15) (10 - 25)  SpO2: 96% (06-10-23 @ 00:15) (91% - 100%)  Wt(kg): --CAPILLARY BLOOD GLUCOSE      POCT Blood Glucose.: 144 mg/dL (10 Thanh 2023 00:02)  POCT Blood Glucose.: 157 mg/dL (2023 22:57)  POCT Blood Glucose.: 178 mg/dL (2023 21:58)  POCT Blood Glucose.: 233 mg/dL (2023 20:50)  POCT Blood Glucose.: 233 mg/dL (2023 19:58)  POCT Blood Glucose.: 236 mg/dL (2023 18:54)  POCT Blood Glucose.: 239 mg/dL (2023 17:53)  POCT Blood Glucose.: 240 mg/dL (2023 16:43)  POCT Blood Glucose.: 242 mg/dL (2023 15:24)  I&O's Summary    2023 07:01  -  2023 07:00  --------------------------------------------------------  IN: 480 mL / OUT: 1250 mL / NET: -770 mL    2023 07:01  -  10 2023 00:59  --------------------------------------------------------  IN: 668.5 mL / OUT: 965 mL / NET: -296.5 mL        Physical Exam  General: NAD  Neuro: A+O x 3, non-focal, speech clear and intact  Psych: Appropriate affect  HEENT:  NCAT, No conjuctival edema or icterus, no thrush.  Pulm: CTA, equal bilaterally  CV: RRR, +S1S2  Abd: soft, NT, ND, +BS  Ext: +DP Pulses b/l, no edema  Skin: Warm, dry, intact  Inc: MSI C/D/I/stable w/ dressing, LE vein harvest site C/D/I with Ace wrap  Chest tubes: mediastinal and pleural CT to SXN, draining appropriately, no AL.         Imaging:    < from: Xray Chest 1 View AP/PA. (23 @ 14:11) >    INTERPRETATION:  Chest one view    HISTORY: Postop    COMPARISON STUDY: 2022    Frontal expiratory view of the chest shows the heart to be similarly   enlarged in size. Endotracheal tube, right jugular Oneida-Neda catheter,   mediastinal drain, nasogastric tube, sternal wires, left atrial appendage   clip and left chest tube are present.    The lungs show mild right base atelectasis with small left effusion and   there is no evidence of pneumothorax nor right pleural effusion.    IMPRESSION:    < end of copied text >              
  POD #6 s/p CABG x 3 (LIMA-LAD, Vein-OM, Diag), Encompass RF Ablation, with 45mm Atriclip    PAST MEDICAL & SURGICAL HISTORY:  HTN (hypertension)  Afib  Osteoarthritis  History of right hip replacement  Obesity, morbid  S/P hip replacement, right    FAMILY HISTORY:  No pertinent family history in first degree relatives    Brief Hospital Course: 57 year old male daily Marijuana smoker with PMHx Afib, HTN, osteoarthritis, Right hip replacement which subsequently required 6 revisions (received IV ABX via PICC line completed approx 3/2023), presented to [facility] ED on 6/7/23 with unstable angina. Pt had a LHC on 6/8/23 with Dr. Sanchez which revealed Multivesssel CAD. Patient electively admitted on 6/8/23 and underwent CABG x 3, Encompass procedure, and BHARTI clipping on 6/9/23 with Dr. Duke. Post-op course complicated by afib RVR (now converted to NSR with PO Amio load).     Significant recent/past 24 hr events: No overnight events reported. Remains NSR on telemetry.     Subjective: Patient lying in bed in NAD. +Tolerating diet. +Passing BMs since surgery. +Pain currently controlled. Denies fevers, chills, lightheadedness, dizziness, HA, CP, palpitations, SOB, cough, abdominal pain, N/V, diarrhea, numbness/tingling in extremities, or any other acute complaints. ROS negative x 10 systems except as noted above.    MEDICATIONS  (STANDING):  acetaminophen     Tablet .. 975 milliGRAM(s) Oral every 6 hours  aMIOdarone    Tablet 400 milliGRAM(s) Oral every 8 hours  apixaban 5 milliGRAM(s) Oral every 12 hours  aspirin enteric coated 81 milliGRAM(s) Oral daily  atorvastatin 80 milliGRAM(s) Oral at bedtime  chlorhexidine 2% Cloths 1 Application(s) Topical   gabapentin 300 milliGRAM(s) Oral three times a day  melatonin 3 milliGRAM(s) Oral at bedtime  methocarbamol 500 milliGRAM(s) Oral three times a day  metoprolol tartrate 25 milliGRAM(s) Oral every 6 hours  pantoprazole    Tablet 40 milliGRAM(s) Oral daily  polyethylene glycol 3350 17 Gram(s) Oral daily  senna 2 Tablet(s) Oral at bedtime  sodium chloride 0.9% lock flush 3 milliLiter(s) IV Push every 8 hours    Allergies: No Known Allergies    Vitals   T(C): 36.4 (14 Jun 2023 23:17), Max: 36.7 (14 Jun 2023 12:00)  T(F): 97.6 (14 Jun 2023 23:17), Max: 98.1 (14 Jun 2023 12:00)  HR: 83 (14 Jun 2023 23:17) (83 - 133)  BP: 148/83 (14 Jun 2023 23:17) (99/70 - 148/83)  RR: 18 (14 Jun 2023 23:17) (16 - 18)  SpO2: 97% (14 Jun 2023 23:17) (95% - 99%)  O2 Parameters below as of 14 Jun 2023 23:17  Patient On (Oxygen Delivery Method): room air    I&O's Detail    13 Jun 2023 07:01  -  14 Jun 2023 07:00  --------------------------------------------------------  IN:    Oral Fluid: 1270 mL  Total IN: 1270 mL    OUT:    Voided (mL): 2550 mL  Total OUT: 2550 mL    Total NET: -1280 mL      14 Jun 2023 07:01  -  15 Thanh 2023 01:30  --------------------------------------------------------  IN:    Oral Fluid: 200 mL  Total IN: 200 mL    OUT:    Voided (mL): 1800 mL  Total OUT: 1800 mL    Total NET: -1600 mL    Physical Exam  Neuro: A+O x 3, non-focal, speech clear and intact  HEENT:  NCAT, No conjuctival edema or icterus, no thrush.    Neck:  Supple, trachea midline  Pulm: CTA bilaterally, no accessory muscle use noted  Chest: +PW (settings: VVI, rate: 50, mA: 10, sensitivity: 0.8)  CV: regular rate, regular rhythm, +S1S2, no murmur or rub noted  Abd: soft, NT, ND, + BS  Ext: JARA x 4, trace edema, no cyanosis, distal motor/neuro/circ intact  Skin: warm, dry, perfused  Incisions: midsternal incision open to air C/D/I, sternum stable, RLE open to air C/D/I    LABS                        9.7    10.12 )-----------( 311      ( 14 Jun 2023 05:10 )             31.8     06-14    138  |  102  |  23.3<H>  ----------------------------<  100<H>  4.6   |  25.0  |  0.85    Ca    8.9      14 Jun 2023 05:10  Mg     2.3     06-14      Last CXR:  < from: Xray Chest 1 View- PORTABLE-Routine (Xray Chest 1 View- PORTABLE-Routine in AM.) (06.13.23 @ 05:01) >  FINDINGS:  Single frontal view of the chest demonstrates left basilar opacity. The cardiomediastinal silhouette is enlarged. Mediastinal sternotomy wires. No acute osseous abnormalities. Overlying EKG leads and wires are noted. No pneumothorax.  IMPRESSION: Left basilar opacity, unchanged. No pneumothorax.  < end of copied text >
Subjective: Patient seen and assessed s/p CABG, Encompass and BHARTI clip. Patient states "I think I'll be going home on Thursday, I'n uncomfortable but its more manageable ."  At time of exam, patien reports incisonal pain with activities, otherwise Pt denies chest pain, palpitations, dizziness, headache, shortness of breath, abdominal pain or N/V/D/C.     Pertinent events of the past 24 hours: downgraded from ICU     VITAL SIGNS  Vital Signs Last 24 Hrs  T(C): 36.7 (06-13-23 @ 21:42), Max: 36.8 (06-13-23 @ 08:00)  T(F): 98 (06-13-23 @ 21:42), Max: 98.2 (06-13-23 @ 08:00)  HR: 94 (06-13-23 @ 23:54) (75 - 130)  BP: 118/74 (06-13-23 @ 23:54) (90/56 - 145/60)  RR: 16 (06-13-23 @ 21:42) (14 - 23)  SpO2: 97% (06-13-23 @ 21:42) (89% - 100%)  on (O2)              MEDICATIONS  acetaminophen     Tablet .. 975 milliGRAM(s) Oral every 6 hours  aMIOdarone    Tablet 200 milliGRAM(s) Oral daily  apixaban 2.5 milliGRAM(s) Oral every 12 hours  aspirin enteric coated 81 milliGRAM(s) Oral daily  atorvastatin 80 milliGRAM(s) Oral at bedtime  chlorhexidine 2% Cloths 1 Application(s) Topical <User Schedule>  digoxin     Tablet 125 MICROGram(s) Oral every other day  furosemide    Tablet 40 milliGRAM(s) Oral daily  gabapentin 300 milliGRAM(s) Oral three times a day  glucagon  Injectable 1 milliGRAM(s) IntraMuscular once  melatonin 3 milliGRAM(s) Oral at bedtime  methocarbamol 500 milliGRAM(s) Oral three times a day  metoprolol tartrate 25 milliGRAM(s) Oral every 6 hours  pantoprazole    Tablet 40 milliGRAM(s) Oral daily  polyethylene glycol 3350 17 Gram(s) Oral daily  senna 2 Tablet(s) Oral at bedtime  sodium chloride 0.9% lock flush 3 milliLiter(s) IV Push every 8 hours  sorbitol 70% Solution 30 milliLiter(s) Oral once    PHYSICAL EXAM  Constitutional: NAD  Neuro: A+O x 3, non-focal, speech clear and intact  CV: irregular rhythm, mild tachycardiz, +S1S2, no murmurs or rub  Pulm/chest: lung sounds CTA and equal bilaterally, no accessory muscle use noted  Abd: +BS, soft, NT, ND, +flatus  Ext: JARA x 4, trace LE edema, +palpable pedal pulses   Skin: warm, well perfused  Psych: calm, appropriate affect  Incison: Midline sternal incision open to air, well approximated, sternum stable, no audible sternal click, Right LE EVH site c/d/i with mepilex ag.   Pacing Wires: VVI 50/10/0.8    Intake and Output  06-12 @ 07:01  -  06-13 @ 07:00  --------------------------------------------------------  IN: 240 mL / OUT: 3132 mL / NET: -2892 mL    06-13 @ 07:01  -  06-14 @ 02:04  --------------------------------------------------------  IN: 870 mL / OUT: 2050 mL / NET: -1180 mL    Weights:  Daily     Daily   Admit Wt: Drug Dosing Weight  Height (cm): 185.4 (12 Jun 2023 00:25)  Weight (kg): 119.3 (12 Jun 2023 00:25)  BMI (kg/m2): 34.7 (12 Jun 2023 00:25)  BSA (m2): 2.42 (12 Jun 2023 00:25)    All laboratory results, radiology and medications reviewed.    LABS  06-13    136  |  98  |  24.3<H>  ----------------------------<  92  4.0   |  25.0  |  0.88    Ca    9.2      13 Jun 2023 13:00  Mg     2.4     06-13                                   9.8    11.32 )-----------( 253      ( 13 Jun 2023 01:13 )             31.4            < from: Xray Chest 1 View- PORTABLE-Routine (Xray Chest 1 View- PORTABLE-Routine in AM.) (06.13.23 @ 05:01) >    INTERPRETATION:  TECHNIQUE: Single portable view of the chest.    COMPARISON:  6/12/2023    CLINICAL HISTORY: s/p OHS    FINDINGS:    Single frontal view of the chest demonstrates left basilar opacity. The   cardiomediastinal silhouette is enlarged. Mediastinal sternotomy wires.   No acute osseous abnormalities. Overlying EKG leads and wires are noted.   No pneumothorax.    IMPRESSION: Left basilar opacity, unchanged. No pneumothorax.    < end of copied text >                     
Subjective - patient seen and evaluated bedside. Sitting comfortably in bed. Denies CP, SOB, HA, dizziness, n/v/d    Review of Systems: negative x 10 systems except as noted above    Brief summary:  57yMale POD# 2 CABGx3    Significant/[id number] events: Afib w/ RVR. amio bolus given and amio gtt initiated.       PAST MEDICAL & SURGICAL HISTORY:  HTN (hypertension)      Afib      Osteoarthritis      History of right hip replacement      Obesity, morbid      S/P hip replacement, right            aMIOdarone Infusion 1 mG/Min IV Continuous <Continuous>  aMIOdarone Infusion 0.5 mG/Min IV Continuous <Continuous>  aspirin enteric coated 81 milliGRAM(s) Oral daily  atorvastatin 80 milliGRAM(s) Oral at bedtime  cefuroxime  IVPB 1500 milliGRAM(s) IV Intermittent every 8 hours  cefuroxime  IVPB      chlorhexidine 2% Cloths 1 Application(s) Topical <User Schedule>  dextrose 50% Injectable 50 milliLiter(s) IV Push every 15 minutes  dextrose 50% Injectable 25 milliLiter(s) IV Push every 15 minutes  enoxaparin Injectable 40 milliGRAM(s) SubCutaneous every 24 hours  gabapentin 300 milliGRAM(s) Oral every 8 hours  insulin regular Infusion 2 Unit(s)/Hr IV Continuous <Continuous>  methocarbamol 500 milliGRAM(s) Oral every 6 hours  metoclopramide Injectable 10 milliGRAM(s) IV Push every 8 hours  metoprolol tartrate 50 milliGRAM(s) Oral two times a day  oxyCODONE    IR 5 milliGRAM(s) Oral every 4 hours PRN  oxyCODONE    IR 10 milliGRAM(s) Oral every 4 hours PRN  pantoprazole    Tablet 40 milliGRAM(s) Oral daily  polyethylene glycol 3350 17 Gram(s) Oral daily  senna 2 Tablet(s) Oral at bedtime  sodium chloride 0.9%. 1000 milliLiter(s) IV Continuous <Continuous>  sodium chloride 0.9%. 1000 milliLiter(s) IV Continuous <Continuous>  vancomycin  IVPB 1500 milliGRAM(s) IV Intermittent every 12 hours  MEDICATIONS  (PRN):  oxyCODONE    IR 5 milliGRAM(s) Oral every 4 hours PRN Moderate Pain (4 - 6)  oxyCODONE    IR 10 milliGRAM(s) Oral every 4 hours PRN Severe Pain (7 - 10)      Daily     Daily Weight in k (10 Thanh 2023 04:36)      ABG - ( 2023 20:35 )  pH, Arterial: 7.350 pH, Blood: x     /  pCO2: 32    /  pO2: 93    / HCO3: 18    / Base Excess: -7.9  /  SaO2: 99.2                                    8.9    10.27 )-----------( 176      ( 10 Thanh 2023 02:00 )             27.9   06-10    131<L>  |  99  |  22.5<H>  ----------------------------<  280<H>  4.0   |  19.0<L>  |  0.90    Ca    7.9<L>      10 Thanh 2023 19:00  Mg     1.9     10    TPro  6.2<L>  /  Alb  3.4  /  TBili  0.2<L>  /  DBili  x   /  AST  46<H>  /  ALT  21  /  AlkPhos  105  0610    CARDIAC MARKERS ( 10 Thanh 2023 02:00 )  x     / 0.53 ng/mL / 508 U/L / x     / 21.2 ng/mL  CARDIAC MARKERS ( 2023 13:50 )  x     / 0.41 ng/mL / 273 U/L / x     / 20.9 ng/mL    PT/INR - ( 10 Thanh 2023 02:00 )   PT: 13.3 sec;   INR: 1.14 ratio         PTT - ( 10 Thanh 2023 02:00 )  PTT:19.3 sec      Objective:  T(C): 36.9 (23 @ 00:00), Max: 36.9 (23 @ 00:00)  HR: 113 (23 @ 00:00) (57 - 133)  BP: 147/86 (23 @ 00:00) (118/65 - 184/100)  RR: 15 (23 @ 00:00) (14 - 36)  SpO2: 95% (23 @ 00:00) (91% - 98%)  Wt(kg): --CAPILLARY BLOOD GLUCOSE      POCT Blood Glucose.: 134 mg/dL (2023 00:15)  POCT Blood Glucose.: 125 mg/dL (10 Thanh 2023 23:05)  POCT Blood Glucose.: 130 mg/dL (10 Thanh 2023 22:03)  POCT Blood Glucose.: 119 mg/dL (10 Thanh 2023 21:16)  POCT Blood Glucose.: 136 mg/dL (10 Thanh 2023 20:00)  POCT Blood Glucose.: 154 mg/dL (10 Thanh 2023 18:47)  POCT Blood Glucose.: 117 mg/dL (10 Thanh 2023 18:12)  POCT Blood Glucose.: 109 mg/dL (10 Thanh 2023 17:05)  POCT Blood Glucose.: 116 mg/dL (10 Thanh 2023 15:35)  POCT Blood Glucose.: 120 mg/dL (10 Thanh 2023 14:06)  POCT Blood Glucose.: 122 mg/dL (10 Thanh 2023 13:19)  POCT Blood Glucose.: 158 mg/dL (10 Thanh 2023 11:35)  POCT Blood Glucose.: 146 mg/dL (10 Thanh 2023 10:01)  POCT Blood Glucose.: 173 mg/dL (10 Thanh 2023 09:06)  POCT Blood Glucose.: 125 mg/dL (10 Thanh 2023 07:42)  POCT Blood Glucose.: 133 mg/dL (10 Thanh 2023 06:45)  POCT Blood Glucose.: 162 mg/dL (10 Thanh 2023 06:11)  POCT Blood Glucose.: 111 mg/dL (10 Thanh 2023 04:02)  POCT Blood Glucose.: 135 mg/dL (10 Thanh 2023 01:59)  I&O's Summary    2023 07:01  -  10 Thanh 2023 07:00  --------------------------------------------------------  IN: 899 mL / OUT: 1590 mL / NET: -691 mL    10 Thanh 2023 07:01  -  2023 01:08  --------------------------------------------------------  IN: 2117 mL / OUT: 2495 mL / NET: -378 mL        Physical Exam  General: NAD  Neuro: A+O x 3, non-focal, speech clear and intact  Psych: Appropriate affect  HEENT:  NCAT, No conjuctival edema or icterus, no thrush.  Pulm: CTA, equal bilaterally  CV:  irregularly irregular, +S1S2  Abd: soft, NT, ND, +BS  Ext: +DP Pulses b/l, no edema  Skin: Warm, dry, intact  Inc: MSI C/D/I/stable w/ dressing, LE vein harvest site C/D/I with Ace wrap  Chest tubes: mediastinal and pleural CT to suction, draining appropriately, no AL.         Imaging:  < from: Xray Chest 1 View- PORTABLE-Routine (06.10.23 @ 05:23) >  INTERPRETATION:  Exam:XR CHEST    clinical history:Post-cardiac surgery    Removal of endotracheal and nasogastric tubes. No pneumothorax. No new   infiltrates.    IMPRESSION: No new infiltrates    --- End of Report ---    < end of copied text >

## 2023-06-16 NOTE — PROGRESS NOTE ADULT - ASSESSMENT
57 year old male daily Marijuana smoker with PMHx Afib, HTN, osteoarthritis, Right hip replacement which subsequently required 6 revisions (received IV ABX via PICC line completed approx 3/2023), presented to [facility] ED on 6/7/23 with unstable angina. Pt had a LHC on 6/8/23 with Dr. Sanchez which revealed Multivesssel CAD. Patient electively admitted on 6/8/23 and underwent CABG x 3, Encompass procedure, and BHARTI clipping on 6/9/23 with Dr. Duke. Post-op course complicated by afib RVR (now converted to NSR with PO Amio load).

## 2023-06-16 NOTE — DISCHARGE NOTE PROVIDER - NSDCMRMEDTOKEN_GEN_ALL_CORE_FT
aspirin 81 mg oral tablet: 1 orally once a day  DilTIAZem (Eqv-Cardizem CD) 240 mg/24 hours oral capsule, extended release: 1 orally once a day  Toprol- mg oral tablet, extended release: 1 orally once a day   acetaminophen 325 mg oral tablet: 2 tab(s) orally every 6 hours as needed for pain  amiodarone 200 mg oral tablet: 1 tab(s) orally once a day  apixaban 5 mg oral tablet: 1 tab(s) orally every 12 hours  aspirin 81 mg oral tablet: 1 orally once a day  atorvastatin 80 mg oral tablet: 1 tab(s) orally once a day (at bedtime)  metoprolol succinate 100 mg oral tablet, extended release: 1 tab(s) orally once a day  senna leaf extract oral tablet: 2 tab(s) orally once a day (at bedtime) as needed for  constipation   acetaminophen 325 mg oral tablet: 2 tab(s) orally every 6 hours as needed for pain  amiodarone 200 mg oral tablet: 1 tab(s) orally once a day  apixaban 5 mg oral tablet: 1 tab(s) orally every 12 hours  aspirin 81 mg oral tablet: 1 orally once a day  atorvastatin 80 mg oral tablet: 1 tab(s) orally once a day (at bedtime)  metoprolol succinate 100 mg oral tablet, extended release: 1 tab(s) orally once a day  oxyCODONE 5 mg oral tablet: 1 tab(s) orally every 6 hours MDD: 4 doses  senna leaf extract oral tablet: 2 tab(s) orally once a day (at bedtime) as needed for  constipation

## 2023-06-16 NOTE — DISCHARGE NOTE NURSING/CASE MANAGEMENT/SOCIAL WORK - PATIENT PORTAL LINK FT
You can access the FollowMyHealth Patient Portal offered by [facility] by registering at the following website: [email or web address]. By joining [facility]’s FollowMyHealth portal, you will also be able to view your health information using other applications (apps) compatible with our system.

## 2023-06-16 NOTE — PROGRESS NOTE ADULT - PROBLEM SELECTOR PLAN 3
Continue lopressor as tolerated by HR and BP.  Continue PO Amio load.   Continue Eliquis 5mg BID.  Pre-discharge TTE as above. No pericardial effusion noted.
Continue lopressor as tolerated by HR and BP.  Continue PO Amio load.   Continue Eliquis 5mg BID.  Will need pre-discharge TTE.
Continue lopressor.  Afib w/ RVR on 6/10 - amio bolus given and amio gtt initiated.  Tachy/cammie (improved) - amio gtt discontinued and transitioned to daily amio.  Remained in RVR 6/11 - lopressor increased, dig load initiated.
Continue lopressor.  Afib w/ RVR on 6/10 - amio bolus given and amio gtt initiated.  Tachy/cammie (improved) - amio gtt discontinued and transitioned to daily amio.  Remained in RVR 6/11 - lopressor increased, dig load initiated.  6/12 's lopressor decreased
BB to resume today.  Will discuss when to restart cardizem.  AC contraindicated in immediate postop setting.
Continue lopressor, amio and digoxin as tolerated by HR/BP  Afib w/ RVR on 6/10 - amio bolus given and amio gtt initiated.  Tachy/cammie (improved) - amio gtt discontinued and transitioned to daily amio.  Remained in RVR 6/11 - lopressor increased, dig load initiated.  6/12 's lopressor decreased  Elquis 2.5BID started 6/13  Will need pre dc TTE
Continue lopressor.  Afib w/ RVR on 6/10 - amio bolus given and amio gtt initiated.  Currently afib rate 90s-110 occasionally briefly converting into NSR.   Will discuss when to restart cardizem.  AC contraindicated in immediate postop setting.

## 2023-06-16 NOTE — DISCHARGE NOTE PROVIDER - NSDCFUADDAPPT_GEN_ALL_CORE_FT
Please follow up with Dr. Duke on _____________.   The cardiac surgery office is located at [facility], first floor. Take a left at the end of the lobby until the end of that kam (past the elevator bank). Make a left and the office is on your right across from the elevators.    Your Care Navigator Nurse Practitioner will be in touch to see you in your home within a few days from discharge. The Follow Your Heart program can help ensure you understand your medications, discharge instructions and answer any questions you may have at that time. They are also a great source to address concerns during the day and may be reached at [phone].    Please follow up with your Cardiologist and Primary Care Physician 2-4 weeks from discharge. Please follow up with Dr. Duke on Tuesday 6/27 @1:30PM.  The cardiac surgery office is located at [facility], first floor. Take a left at the end of the lobby until the end of that kam (past the elevator bank). Make a left and the office is on your right across from the elevators.    Your Care Navigator Nurse Practitioner will be in touch to see you in your home within a few days from discharge. The Follow Your Heart program can help ensure you understand your medications, discharge instructions and answer any questions you may have at that time. They are also a great source to address concerns during the day and may be reached at [phone].    Please follow up with your Cardiologist and Primary Care Physician 2-4 weeks from discharge.

## 2023-06-17 ENCOUNTER — TRANSCRIPTION ENCOUNTER (OUTPATIENT)
Age: 58
End: 2023-06-17

## 2023-06-19 ENCOUNTER — APPOINTMENT (OUTPATIENT)
Dept: CARE COORDINATION | Facility: HOME HEALTH | Age: 58
End: 2023-06-19
Payer: MEDICAID

## 2023-06-19 VITALS — BODY MASS INDEX: 33.91 KG/M2 | WEIGHT: 257 LBS

## 2023-06-19 PROCEDURE — 99024 POSTOP FOLLOW-UP VISIT: CPT

## 2023-06-19 RX ORDER — METOPROLOL SUCCINATE 200 MG/1
200 TABLET, EXTENDED RELEASE ORAL
Qty: 90 | Refills: 3 | Status: DISCONTINUED | COMMUNITY
Start: 2022-11-30 | End: 2023-06-19

## 2023-06-19 RX ORDER — DILTIAZEM HYDROCHLORIDE 240 MG/1
240 CAPSULE, EXTENDED RELEASE ORAL
Qty: 90 | Refills: 1 | Status: DISCONTINUED | COMMUNITY
Start: 2022-11-30 | End: 2023-06-19

## 2023-06-19 NOTE — ASSESSMENT
[FreeTextEntry1] : Pt recovering well at home s/p cabg x 3   . Reviewed all medications and dosages with pt understanding. Pt dispenses meds appropriately into med dispenser each week. Pt has all medications in home and is taking as prescribed. Denies pain at this time. No new symptoms, issues or concerns to report at this time. Appt schedule reviewed with pt verbalizing understanding.\par

## 2023-06-19 NOTE — PHYSICAL EXAM
[Neck Appearance] : the appearance of the neck was normal [Abnormal Walk] : normal gait [] : no rash [No Focal Deficits] : no focal deficits [Oriented To Time, Place, And Person] : oriented to person, place, and time [FreeTextEntry1] : MSI and CT sites without erythema, drainage or warmth, with edges well approximated.\par \par   [FreeTextEntry2] : SVG harvest site without erythema, warmth or drainage. Resolving ecchymosis to thigh area. \par \par

## 2023-06-19 NOTE — HISTORY OF PRESENT ILLNESS
[FreeTextEntry1] : Hospital Course: \par Discharge Date	16-Jun-2023 \par Admission Date	08-Jun-2023 16:28 \par Reason for Admission	Pt with c/o CP for a LHC \par Hospital Course	 \par 57 year old male daily Marijuana smoker with PMHx Afib, HTN, osteoarthritis, \par Right hip replacement which subsequently required 6 revisions (received IV ABX \par via PICC line completed approx 3/2023), presented to [facility] ED on 6/7/23 with \par unstable angina. Pt had a LHC on 6/8/23 with Dr. Sanchez which revealed \par Multivesssel CAD. Patient electively admitted on 6/8/23 and underwent CABG x 3, \par Encompass procedure, and BHARTI clipping on 6/9/23 with Dr. Duke. Post-op \par course complicated by afib RVR (now converted to NSR with PO Amio load). \par Patient remains hemodynamically stable and stable from a respiratory standpoint \par at this time. Plan for discharge home later today as per Dr. Duke. \par \par Today patient was seen today s/p discharge from [facility]  . Patient informed they are being followed by [facility] program. Time was spent with the patient reviewing the discharge material including medications, follow up appointments, recovery, concerning symptoms, and how to contact me should they have questions. \par

## 2023-06-22 ENCOUNTER — NON-APPOINTMENT (OUTPATIENT)
Age: 58
End: 2023-06-22

## 2023-06-24 ENCOUNTER — TRANSCRIPTION ENCOUNTER (OUTPATIENT)
Age: 58
End: 2023-06-24

## 2023-06-25 ENCOUNTER — TRANSCRIPTION ENCOUNTER (OUTPATIENT)
Age: 58
End: 2023-06-25

## 2023-06-27 ENCOUNTER — APPOINTMENT (OUTPATIENT)
Dept: CARDIOTHORACIC SURGERY | Facility: CLINIC | Age: 58
End: 2023-06-27
Payer: MEDICAID

## 2023-06-27 ENCOUNTER — NON-APPOINTMENT (OUTPATIENT)
Age: 58
End: 2023-06-27

## 2023-06-27 VITALS
TEMPERATURE: 98.5 F | DIASTOLIC BLOOD PRESSURE: 73 MMHG | RESPIRATION RATE: 16 BRPM | HEIGHT: 73 IN | WEIGHT: 257 LBS | HEART RATE: 65 BPM | SYSTOLIC BLOOD PRESSURE: 127 MMHG | BODY MASS INDEX: 34.06 KG/M2 | OXYGEN SATURATION: 97 %

## 2023-06-27 PROCEDURE — 99024 POSTOP FOLLOW-UP VISIT: CPT

## 2023-06-28 ENCOUNTER — APPOINTMENT (OUTPATIENT)
Dept: INTERNAL MEDICINE | Facility: CLINIC | Age: 58
End: 2023-06-28
Payer: MEDICAID

## 2023-06-28 VITALS
TEMPERATURE: 97.3 F | HEART RATE: 69 BPM | WEIGHT: 257 LBS | DIASTOLIC BLOOD PRESSURE: 70 MMHG | HEIGHT: 73 IN | BODY MASS INDEX: 34.06 KG/M2 | OXYGEN SATURATION: 98 % | SYSTOLIC BLOOD PRESSURE: 130 MMHG

## 2023-06-28 DIAGNOSIS — Z01.818 ENCOUNTER FOR OTHER PREPROCEDURAL EXAMINATION: ICD-10-CM

## 2023-06-28 DIAGNOSIS — Z01.810 ENCOUNTER FOR PREPROCEDURAL CARDIOVASCULAR EXAMINATION: ICD-10-CM

## 2023-06-28 PROCEDURE — 99214 OFFICE O/P EST MOD 30 MIN: CPT

## 2023-06-28 RX ORDER — ACETAMINOPHEN 325 MG/1
325 TABLET ORAL
Refills: 0 | Status: DISCONTINUED | COMMUNITY
Start: 2023-06-19 | End: 2023-06-28

## 2023-06-28 NOTE — ASSESSMENT
[FreeTextEntry1] : PMH: CAD (s/p CABG x 3; 6/2023), Moderate LVH (ECHO: 6/2022), AFib, Obesity, H/o Right femur, BPH\par -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 Occasional EtOH use. \par \par ARSLAN is a 57 year M whom is here today for f/u CAD s/p CABG x 3  june 2023, AFib, HTN\par \par -RTO 3mo

## 2023-06-28 NOTE — REASON FOR VISIT
[de-identified] : Triple coronary artery bypass grafting using the left internal mammary artery to the left anterior descending artery, saphenous vein graft to the obtuse marginal and saphenous vein graft to the diagonal artery. The saphenous vein was harvested endoscopically. Encompass ablation of the pulmonary veins and closure of the left atrial appendage [de-identified] : 6/9/23 [de-identified] : 57 year old male daily Marijuana smoker with PMHx Afib, HTN, osteoarthritis, Right hip replacement which subsequently required 6 revisions (received IV ABX via PICC line completed approx 3/2023), presented to [facility] ED on 6/7/23 with unstable angina. Pt had a LHC on 6/8/23 with Dr. Sanchez which revealed Multivesssel CAD. Patient electively admitted on 6/8/23 and underwent CABG x 3, Encompass procedure, and BHARTI clipping on 6/9/23 with Dr. Duke. Post-op course complicated by afib RVR (now converted to NSR with PO Amio load). \par Patient remains hemodynamically stable and stable from a respiratory standpoint at this time. Plan for discharge home later today as per Dr. Duke. Discharged home 6/16/2023.\par \par Today the patient reports fatigue but does do short walks 2-3 x a day. He does have some chest pain at night when laying down or if coughs. Patient denies palpitations, shortness of breath, cough, fevers, chills, fatigue and unintentional weight loss or gain.

## 2023-06-28 NOTE — ASSESSMENT
[FreeTextEntry1] : I had the pleasure of seeing Mr. ARSLAN BARBOSA  in the office today.\par \par Briefly, Mr. BARBOSA is an 57 year old male who is status post triple coronary artery bypass grafting, Encompass ablation, left atrial appendage on 6/9/23. The postoperative course was notable for atrial fibrillation with rapid ventricular rate. Since discharge, Mr. BARBOSA has been slowly improving and at this time primarily complains of fatigue and intermittent incisional pain. Exam notable for mild vein site erythema and pitting edema.\par \par During the visit, we discussed secondary prevention of coronary artery disease. This involves close follow up with their cardiologist, taking all their medications and diet and exercise. Specifically, I recommend starting an exercise regimen 30 minutes 3 times a week with a light activity such as walking. With regards to diet, I recommended working towards a whole food, plant based diet with a focus on fruits, vegetables, whole grains and legumes, minimizing or eliminating animal based products.\par \par Overall, I am happy with Mr. BARBOSA's progress. All questions were answered and concerns were addressed. I encouraged the patient to call the office with any other concerns.\par \par In summary:\par - May drive starting at one month\par - Continue current medications  \par - Continue incentive spirometry\par - Increase activity as tolerated\par - Eat a heart healthy diet\par - No further need to return to our office for followup unless specific surgical concerns\par - Follow up with his  cardiologist, Dr. Sanchez\par \par I, Dr. Fred Duke, personally performed the evaluation and management (E/M) services for this established patient who presents today with an existing condition.  That E/M includes conducting the examination, assessing all conditions, and establishing a new plan of care.  Today, Cosmo Zelaya PA-C, was here to observe my evaluation and management services for this/these condition(s) to be followed going forward.\par \par

## 2023-06-28 NOTE — PHYSICAL EXAM
[Respiration, Rhythm And Depth] : normal respiratory rhythm and effort [Auscultation Breath Sounds / Voice Sounds] : lungs were clear to auscultation bilaterally [Heart Rate And Rhythm] : heart rate was normal and rhythm regular [Heart Sounds] : normal S1 and S2 [Murmurs] : no murmurs [Clean] : clean [Dry] : dry [Healing Well] : healing well [___ +] : right pretibial [unfilled]U+ pitting edema [Erythema] : not erythematous [Bleeding] : no active bleeding [Foul Odor] : no foul smell [Purulent Drainage] : no purulent drainage [Serosanguinous Drainage] : no serosanguinous drainage [Warm] : not warm [Tender] : not tender [FreeTextEntry5] : r evh, mild erythema

## 2023-06-28 NOTE — CONSULT LETTER
[Dear  ___] : Dear  [unfilled], [Courtesy Letter:] : I had the pleasure of seeing your patient, [unfilled], in my office today. [Please see my note below.] : Please see my note below. [Consult Closing:] : Thank you very much for allowing me to participate in the care of this patient.  If you have any questions, please do not hesitate to contact me. [Sincerely,] : Sincerely, [FreeTextEntry2] : Ben Sanchez MD [FreeTextEntry3] : Fred Duke MD\par Chief, Cardiovascular Surgery at [facility]\par System Director of Surgical Heart Failure\par Professor, Cardiovascular and Thoracic Surgery\par [employer] [facility] [employer], [facility]\par [facility]\par [address] [place] [address]\[place] [address]\par Tel. [phone]\par Fax [phone]\par

## 2023-06-28 NOTE — HISTORY OF PRESENT ILLNESS
[FreeTextEntry1] : CAD s/p CABG x 3  june 2023, AFib, HTN [de-identified] : PMH: CAD (s/p CABG x 3; 6/2023), Moderate LVH (ECHO: 6/2022), AFib, Obesity, H/o Right femur, BPH\par -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 Occasional EtOH use. \par \par ARSLAN is a 57 year M whom is here today for f/u CAD s/p CABG x 3  june 2023, AFib, HTN\par today, pt reports feeling well\par \par -CAD (s/p CABG x 3; 6/2023): Remains on Atorvastatin 80mg QD & ASA 81mg QD. \par -AFib: LTAC w/ Eliquis. Rate controlled on Toprol and Amiodarone. On aspirin 81 mg.

## 2023-06-29 ENCOUNTER — EMERGENCY (EMERGENCY)
Facility: HOSPITAL | Age: 58
LOS: 1 days | Discharge: HOME | End: 2023-06-29
Attending: STUDENT IN AN ORGANIZED HEALTH CARE EDUCATION/TRAINING PROGRAM
Payer: COMMERCIAL

## 2023-06-29 ENCOUNTER — NON-APPOINTMENT (OUTPATIENT)
Age: 58
End: 2023-06-29

## 2023-06-29 VITALS
TEMPERATURE: 98 F | HEART RATE: 60 BPM | RESPIRATION RATE: 18 BRPM | DIASTOLIC BLOOD PRESSURE: 77 MMHG | OXYGEN SATURATION: 98 % | SYSTOLIC BLOOD PRESSURE: 133 MMHG

## 2023-06-29 VITALS
HEART RATE: 70 BPM | OXYGEN SATURATION: 98 % | HEIGHT: 73 IN | RESPIRATION RATE: 18 BRPM | SYSTOLIC BLOOD PRESSURE: 158 MMHG | DIASTOLIC BLOOD PRESSURE: 94 MMHG | WEIGHT: 255.96 LBS | TEMPERATURE: 98 F

## 2023-06-29 DIAGNOSIS — Z96.641 PRESENCE OF RIGHT ARTIFICIAL HIP JOINT: Chronic | ICD-10-CM

## 2023-06-29 DIAGNOSIS — R55 SYNCOPE AND COLLAPSE: ICD-10-CM

## 2023-06-29 LAB
ALBUMIN SERPL ELPH-MCNC: 3.6 G/DL — SIGNIFICANT CHANGE UP (ref 3.3–5.2)
ALP SERPL-CCNC: 169 U/L — HIGH (ref 40–120)
ALT FLD-CCNC: 14 U/L — SIGNIFICANT CHANGE UP
ANION GAP SERPL CALC-SCNC: 13 MMOL/L — SIGNIFICANT CHANGE UP (ref 5–17)
APTT BLD: 40.2 SEC — HIGH (ref 27.5–35.5)
AST SERPL-CCNC: 22 U/L — SIGNIFICANT CHANGE UP
BASOPHILS # BLD AUTO: 0.05 K/UL — SIGNIFICANT CHANGE UP (ref 0–0.2)
BASOPHILS NFR BLD AUTO: 0.7 % — SIGNIFICANT CHANGE UP (ref 0–2)
BILIRUB SERPL-MCNC: 0.3 MG/DL — LOW (ref 0.4–2)
BUN SERPL-MCNC: 16.4 MG/DL — SIGNIFICANT CHANGE UP (ref 8–20)
CALCIUM SERPL-MCNC: 9.4 MG/DL — SIGNIFICANT CHANGE UP (ref 8.4–10.5)
CHLORIDE SERPL-SCNC: 103 MMOL/L — SIGNIFICANT CHANGE UP (ref 96–108)
CO2 SERPL-SCNC: 21 MMOL/L — LOW (ref 22–29)
CREAT SERPL-MCNC: 0.84 MG/DL — SIGNIFICANT CHANGE UP (ref 0.5–1.3)
EGFR: 103 ML/MIN/1.73M2 — SIGNIFICANT CHANGE UP
EOSINOPHIL # BLD AUTO: 0.59 K/UL — HIGH (ref 0–0.5)
EOSINOPHIL NFR BLD AUTO: 8.1 % — HIGH (ref 0–6)
GLUCOSE SERPL-MCNC: 96 MG/DL — SIGNIFICANT CHANGE UP (ref 70–99)
HCT VFR BLD CALC: 30.4 % — LOW (ref 39–50)
HGB BLD-MCNC: 9.1 G/DL — LOW (ref 13–17)
IMM GRANULOCYTES NFR BLD AUTO: 0.3 % — SIGNIFICANT CHANGE UP (ref 0–0.9)
INR BLD: 1.51 RATIO — HIGH (ref 0.88–1.16)
LYMPHOCYTES # BLD AUTO: 1.73 K/UL — SIGNIFICANT CHANGE UP (ref 1–3.3)
LYMPHOCYTES # BLD AUTO: 23.9 % — SIGNIFICANT CHANGE UP (ref 13–44)
MAGNESIUM SERPL-MCNC: 2.1 MG/DL — SIGNIFICANT CHANGE UP (ref 1.6–2.6)
MCHC RBC-ENTMCNC: 23 PG — LOW (ref 27–34)
MCHC RBC-ENTMCNC: 29.9 GM/DL — LOW (ref 32–36)
MCV RBC AUTO: 77 FL — LOW (ref 80–100)
MONOCYTES # BLD AUTO: 0.54 K/UL — SIGNIFICANT CHANGE UP (ref 0–0.9)
MONOCYTES NFR BLD AUTO: 7.4 % — SIGNIFICANT CHANGE UP (ref 2–14)
NEUTROPHILS # BLD AUTO: 4.32 K/UL — SIGNIFICANT CHANGE UP (ref 1.8–7.4)
NEUTROPHILS NFR BLD AUTO: 59.6 % — SIGNIFICANT CHANGE UP (ref 43–77)
NT-PROBNP SERPL-SCNC: 491 PG/ML — HIGH (ref 0–300)
PLATELET # BLD AUTO: 401 K/UL — HIGH (ref 150–400)
POTASSIUM SERPL-MCNC: 4.5 MMOL/L — SIGNIFICANT CHANGE UP (ref 3.5–5.3)
POTASSIUM SERPL-SCNC: 4.5 MMOL/L — SIGNIFICANT CHANGE UP (ref 3.5–5.3)
PROT SERPL-MCNC: 7.8 G/DL — SIGNIFICANT CHANGE UP (ref 6.6–8.7)
PROTHROM AB SERPL-ACNC: 17.6 SEC — HIGH (ref 10.5–13.4)
RBC # BLD: 3.95 M/UL — LOW (ref 4.2–5.8)
RBC # FLD: 15.3 % — HIGH (ref 10.3–14.5)
SODIUM SERPL-SCNC: 137 MMOL/L — SIGNIFICANT CHANGE UP (ref 135–145)
TROPONIN T SERPL-MCNC: 0.01 NG/ML — SIGNIFICANT CHANGE UP (ref 0–0.06)
WBC # BLD: 7.25 K/UL — SIGNIFICANT CHANGE UP (ref 3.8–10.5)
WBC # FLD AUTO: 7.25 K/UL — SIGNIFICANT CHANGE UP (ref 3.8–10.5)

## 2023-06-29 PROCEDURE — 99285 EMERGENCY DEPT VISIT HI MDM: CPT | Mod: 25

## 2023-06-29 PROCEDURE — 83880 ASSAY OF NATRIURETIC PEPTIDE: CPT

## 2023-06-29 PROCEDURE — 93005 ELECTROCARDIOGRAM TRACING: CPT

## 2023-06-29 PROCEDURE — 84484 ASSAY OF TROPONIN QUANT: CPT

## 2023-06-29 PROCEDURE — 83735 ASSAY OF MAGNESIUM: CPT

## 2023-06-29 PROCEDURE — 71045 X-RAY EXAM CHEST 1 VIEW: CPT

## 2023-06-29 PROCEDURE — 80053 COMPREHEN METABOLIC PANEL: CPT

## 2023-06-29 PROCEDURE — 85025 COMPLETE CBC W/AUTO DIFF WBC: CPT

## 2023-06-29 PROCEDURE — 36415 COLL VENOUS BLD VENIPUNCTURE: CPT

## 2023-06-29 PROCEDURE — 85730 THROMBOPLASTIN TIME PARTIAL: CPT

## 2023-06-29 PROCEDURE — 99285 EMERGENCY DEPT VISIT HI MDM: CPT

## 2023-06-29 PROCEDURE — 85610 PROTHROMBIN TIME: CPT

## 2023-06-29 PROCEDURE — 93306 TTE W/DOPPLER COMPLETE: CPT

## 2023-06-29 PROCEDURE — 99282 EMERGENCY DEPT VISIT SF MDM: CPT | Mod: 24

## 2023-06-29 PROCEDURE — 71045 X-RAY EXAM CHEST 1 VIEW: CPT | Mod: 26

## 2023-06-29 PROCEDURE — 93010 ELECTROCARDIOGRAM REPORT: CPT

## 2023-06-29 RX ORDER — CEFPODOXIME PROXETIL 100 MG
1 TABLET ORAL
Qty: 14 | Refills: 0
Start: 2023-06-29 | End: 2023-07-05

## 2023-06-29 NOTE — PROGRESS NOTE ADULT - PROBLEM SELECTOR PLAN 1
work up benign  likely due to vasovagal episode  resume home medications  vantin x1 week for RLE erythema and cough  rec f/u with cardiology and PCP  no need to f/u with Dr. Duke unless further surgical concerns  pt stable for d/c home per Dr. Duke      d/w Dr. Duke

## 2023-06-29 NOTE — PROGRESS NOTE ADULT - SUBJECTIVE AND OBJECTIVE BOX
HPI: 57 year old male daily Marijuana smoker with PMHx Afib, HTN, osteoarthritis, Right hip replacement which subsequently required 6 revisions (received IV ABX via PICC line completed approx 3/2023), s/p C3L, BHARTI clip 6/9 with Dr. Duke, postop course c/b AF RVR, now presents to ED after syncopal episode last night.  Pt states he was sitting on edge of his bed and starting having a coughing fit, states the next thing he knew his girlfriend was shaking him awake and said his eyes rolled back and he slumped back on bed. Pt felt fine at the time and did not want to to come to ED, but called follow your hear this morning who encouraged pt to come to ED. Pt states he has had more aggressive coughing fits than that before and doesnt know why he passed out, states he had no symptoms of dizziness/vision changes/weakness/numbness/tingling. In ED, labs stable, EKG stable, TTE benign, orthostatics negative. Pt denies CP, palpitations, SOB, cough, fever, chills, itchiness/rash, diaphoresis, vision changes, HA, dizziness/lightheadedness, numbness/tingling, abd pain, N/V.     T(C): 36.7 (06-29-23 @ 15:53), Max: 36.7 (06-29-23 @ 09:14)  HR: 60 (06-29-23 @ 15:53) (60 - 70)  BP: 133/77 (06-29-23 @ 15:53) (133/77 - 158/94)  RR: 18 (06-29-23 @ 15:53) (18 - 18)  SpO2: 98% (06-29-23 @ 15:53) (97% - 98%)    06-29    137  |  103  |  16.4  ----------------------------<  96  4.5   |  21.0<L>  |  0.84    Ca    9.4      29 Jun 2023 10:20  Mg     2.1     06-29    TPro  7.8  /  Alb  3.6  /  TBili  0.3<L>  /  DBili  x   /  AST  22  /  ALT  14  /  AlkPhos  169<H>  06-29                               9.1    7.25  )-----------( 401      ( 29 Jun 2023 10:20 )             30.4        PT/INR - ( 29 Jun 2023 10:20 )   PT: 17.6 sec;   INR: 1.51 ratio    PTT - ( 29 Jun 2023 10:20 )  PTT:40.2 sec    I&O's Detail    Drug Dosing Weight  Height (cm): 185.4 (29 Jun 2023 09:14)  Weight (kg): 116.1 (29 Jun 2023 09:14)  BMI (kg/m2): 33.8 (29 Jun 2023 09:14)  BSA (m2): 2.39 (29 Jun 2023 09:14)    Physical Exam  Gen: NAD  Neuro: A&Ox3 non focal speech clear and intact  HEENT: PERRL   Pulm: CTA b/l no wheezing  CV: S1S2 RRR  Abd: +BS soft NT ND  Extrem/MS: no edema/cyanosis   Incision(s): mid sternal inc C/D/I no click, RLE EVH mild erythema warm tender to touch

## 2023-06-29 NOTE — ED PROVIDER NOTE - ATTENDING CONTRIBUTION TO CARE
Pt states that last night he was at home and had a coughing spell and passed out backwards onto his bed.  his girlfriend witnessed it and states that he shook after he passed out but when he woke up there was no confused period. no tongue-biting. no urinary incontinence.    physical - rrr. ctab. abd - soft, nt. no edema. no rash.     plan - labs, ekg, cxr, tte, cardiothoracic to see.

## 2023-06-29 NOTE — ED CLERICAL - NS ED CLERK NOTE PRE-ARRIVAL INFORMATION; ADDITIONAL PRE-ARRIVAL INFORMATION
This patient is enrolled in the Follow Your Heart program and has undergone a cardiac surgery procedure and has active care navigation. This patient can be followed up by the care navigation team within 24 hours. To arrange close follow-up or to obtain additional clinical information about this patient, please call the contact number above. Please call the cardiac surgery team once patient is registered at [phone] for consultation PRIOR to disposition decision.  The patient recently underwent a cardiac surgery procedure and the team can assist in acute medical management.

## 2023-06-29 NOTE — ED ADULT NURSE NOTE - NSFALLRISKINTERV_ED_ALL_ED

## 2023-06-29 NOTE — ED PROVIDER NOTE - NSFOLLOWUPINSTRUCTIONS_ED_ALL_ED_FT
- Follow up with your cardiologist   - Return to the emergency department immediately for worsening chest pain, difficulty breathing, or other concerning symptoms

## 2023-06-29 NOTE — ED ADULT NURSE REASSESSMENT NOTE - NS ED NURSE REASSESS COMMENT FT1
Pt is resting in bed comfortably at this time, no apparent distress noted at this time. pt safety maintained. Pt denies any complaints at this time. pt updated on plan of care. Pt awaiting results of echo

## 2023-06-29 NOTE — ED PROVIDER NOTE - NS ED ROS FT
Gen: No fever, (+)syncope  ENT: No congestion, no rhinorrhea  Resp: (+) cough, no trouble breathing  Cardiovascular: No chest pain, no palpitation  Gastrointestinal: No nausea, no vomiting, no diarrhea  :  No change in urine output; no dysuria, no hematuria  MS: No joint or muscle pain  Skin: No rashes  Neuro: No headache; no abnormal movements  Remainder negative, except as per the HPI

## 2023-06-29 NOTE — ED PROVIDER NOTE - CLINICAL SUMMARY MEDICAL DECISION MAKING FREE TEXT BOX
57 year old male with recent CABG x3 with BHARTI clipping 6/9/23 by Dr. Duke presenting with episode of syncope after coughing fit last night with b/l upper and lower extremity shaking lasting ~45seconds witnessed by girlfriend. 57 year old male with recent CABG x3 with BHARTI clipping 6/9/23 by Dr. Duke presenting with episode of syncope after coughing fit last night with b/l upper and lower extremity shaking lasting ~45seconds witnessed by girlfriend. Labs , chest x-ray, ECHO obtained. CT Surgery evaluated patient, they recommend 1 week Vantin; sent to pharmacy, cleared for dc home.

## 2023-06-29 NOTE — ED PROVIDER NOTE - PHYSICAL EXAMINATION
Gen: well appearing, no acute distress  Head: normocephalic, atraumatic  EENT: EOMI, moist mucous membranes, no scleral icterus, no JVD  Lung: no increased work of breathing, clear to auscultation bilaterally, no wheezing, rales, rhonchi, speaking in full sentences  CV: regular rate, regular rhythm, normal s1/s2, 2+ radial pulses bilaterally  CHEST: (+) well healing midline sternotomy scar, no significant ttp   Abd: soft, non-tender, non-distended, no rebound tenderness or guarding; no CVA tenderness  MSK: No edema, no visible deformities, full range of motion in all 4 extremities  Neuro: Awake, alert, no focal neurologic deficits  Skin: No obvious rash, no jaundice  Psych: normal affect, normal speech

## 2023-06-29 NOTE — PROGRESS NOTE ADULT - ASSESSMENT
57 year old male daily Marijuana smoker with PMHx Afib, HTN, osteoarthritis, Right hip replacement which subsequently required 6 revisions (received IV ABX via PICC line completed approx 3/2023), s/p C3L, BHARTI clip 6/9 with Dr. Duke, postop course c/b AF RVR, now presents to ED after syncopal episode last night.

## 2023-06-29 NOTE — ED ADULT NURSE NOTE - OBJECTIVE STATEMENT
Pt care acquired at 0945. Pt is A&Ox4 and is resting in bed. Pt arrives at the ED c/o syncopal event that was witnessed by girlfriend. Pt states that she saw his eyes roll behind his head and he was unconciouss for 45 seconds. Pt states he passed out on a bed so no injury was sustained. Pt states that he had bypass surgery two weeks ago. Pt states he has chest pain since the surgery and is short of breath on some days. Pt is breathing unlabored on room air. Pt is in nsr on the cardiac monitor. Pt lung sounds are clear bilaterally. VSS.

## 2023-06-29 NOTE — ED PROVIDER NOTE - OBJECTIVE STATEMENT
57 year old male with PMHx HTN, osteoarthritis, afib, on Eliquis 5mg BID, R hip replacement requiring 6 revisions c/b infection requiring IV abx via PICC (3/2023), CAD s/p CABG x3 and BHARTI 6/9/23 presenting today for evaluation of episode of syncope yesterday. Patient states was doing okay post op until yesterday when he had a coughing fit while sitting on his bed, then syncopized, witnessed by girlfriend, fell backwards onto bed with b/l upper and lower extremity shaking with his eyes rolled back lasting about 45 seconds. Patient does not remember what happened. Called Dr. Duke's office this morning and was advised to come to ED. Patient states has had intermittent coughing of white phlegm since surgery with occasional fits. Denies any fever, nausea, vomiting, headache, abdominal pain, increased chest pain, or difficulty breathing.

## 2023-06-29 NOTE — ED PROVIDER NOTE - PATIENT PORTAL LINK FT
You can access the FollowMyHealth Patient Portal offered by [facility] by registering at the following website: [email or web address]. By joining [email or web address]’s FollowMyHealth portal, you will also be able to view your health information using other applications (apps) compatible with our system.

## 2023-06-29 NOTE — ED ADULT TRIAGE NOTE - CHIEF COMPLAINT QUOTE
Pt states he coughed last night and had syncopal episode, recent triple bypass surgery on 6/9, told to come to ED by Dr Duke

## 2023-07-02 ENCOUNTER — TRANSCRIPTION ENCOUNTER (OUTPATIENT)
Age: 58
End: 2023-07-02

## 2023-07-03 ENCOUNTER — TRANSCRIPTION ENCOUNTER (OUTPATIENT)
Age: 58
End: 2023-07-03

## 2023-07-05 ENCOUNTER — APPOINTMENT (OUTPATIENT)
Dept: CARDIOLOGY | Facility: CLINIC | Age: 58
End: 2023-07-05

## 2023-07-07 ENCOUNTER — APPOINTMENT (OUTPATIENT)
Dept: CARDIOLOGY | Facility: CLINIC | Age: 58
End: 2023-07-07
Payer: MEDICAID

## 2023-07-07 ENCOUNTER — NON-APPOINTMENT (OUTPATIENT)
Age: 58
End: 2023-07-07

## 2023-07-07 VITALS
DIASTOLIC BLOOD PRESSURE: 82 MMHG | OXYGEN SATURATION: 94 % | HEIGHT: 73 IN | HEART RATE: 65 BPM | SYSTOLIC BLOOD PRESSURE: 144 MMHG | BODY MASS INDEX: 32.87 KG/M2 | WEIGHT: 248 LBS | TEMPERATURE: 98 F

## 2023-07-07 PROCEDURE — 93000 ELECTROCARDIOGRAM COMPLETE: CPT

## 2023-07-07 PROCEDURE — 99215 OFFICE O/P EST HI 40 MIN: CPT | Mod: 25

## 2023-07-07 RX ORDER — ASPIRIN 81 MG/1
81 TABLET ORAL
Qty: 90 | Refills: 1 | Status: DISCONTINUED | COMMUNITY
Start: 2022-11-30 | End: 2023-07-07

## 2023-07-09 ENCOUNTER — TRANSCRIPTION ENCOUNTER (OUTPATIENT)
Age: 58
End: 2023-07-09

## 2023-07-10 ENCOUNTER — NON-APPOINTMENT (OUTPATIENT)
Age: 58
End: 2023-07-10

## 2023-07-18 ENCOUNTER — TRANSCRIPTION ENCOUNTER (OUTPATIENT)
Age: 58
End: 2023-07-18

## 2023-07-26 ENCOUNTER — NON-APPOINTMENT (OUTPATIENT)
Age: 58
End: 2023-07-26

## 2023-07-26 ENCOUNTER — APPOINTMENT (OUTPATIENT)
Dept: ELECTROPHYSIOLOGY | Facility: CLINIC | Age: 58
End: 2023-07-26

## 2023-07-26 ENCOUNTER — APPOINTMENT (OUTPATIENT)
Dept: ELECTROPHYSIOLOGY | Facility: CLINIC | Age: 58
End: 2023-07-26
Payer: MEDICAID

## 2023-07-26 VITALS
DIASTOLIC BLOOD PRESSURE: 76 MMHG | HEART RATE: 73 BPM | TEMPERATURE: 98.9 F | BODY MASS INDEX: 34.46 KG/M2 | HEIGHT: 73 IN | OXYGEN SATURATION: 95 % | SYSTOLIC BLOOD PRESSURE: 124 MMHG | WEIGHT: 260 LBS

## 2023-07-26 DIAGNOSIS — R55 SYNCOPE AND COLLAPSE: ICD-10-CM

## 2023-07-26 DIAGNOSIS — B37.2 CANDIDIASIS OF SKIN AND NAIL: ICD-10-CM

## 2023-07-26 PROCEDURE — 99203 OFFICE O/P NEW LOW 30 MIN: CPT | Mod: 25

## 2023-07-26 PROCEDURE — 93000 ELECTROCARDIOGRAM COMPLETE: CPT

## 2023-07-26 RX ORDER — DOXYCYCLINE HYCLATE 100 MG/1
100 TABLET ORAL
Qty: 14 | Refills: 0 | Status: DISCONTINUED | COMMUNITY
Start: 2023-06-30 | End: 2023-07-26

## 2023-08-01 NOTE — H&P PST ADULT - GENITOURINARY
- MRI of the brain  - Propranolol 10mg twice daily for migraine prevention  - Rizatriptan 10mg ODT at first sign of severe migraine; can repeat in 2 hrs if needed; max 20mg/24hrs.  - Follow up in about 6 weeks.   
negative

## 2023-08-17 NOTE — H&P PST ADULT - NSICDXPASTSURGICALHX_GEN_ALL_CORE_FT
PAST SURGICAL HISTORY:  S/P CABG x 3     S/P hip replacement, right     S/P left atrial appendage ligation

## 2023-08-17 NOTE — H&P PST ADULT - HISTORY OF PRESENT ILLNESS
57 year old male daily Marijuana smoker with PMHx Afib, HTN, osteoarthritis, Right hip replacement which subsequently required 6 revisions (received IV ABX via PICC line completed approx 3/2023), presented to [facility] ED on 6/7/23 with unstable angina. Pt had a LHC on 6/8/23 with Dr. Sanchez which revealed Multivessel CAD. Patient electively admitted on 6/8/23 and underwent CABG x 3, Encompass procedure, and BHARTI clipping on 6/9/23 with Dr. Duke. Post-op course complicated by afib RVR (now converted to NSR with PO Amio load). on 6/28/2023, patient had a syncopal episode at home after coughing, he was advised to go to the ED. At the ED, EKG and trop were stable, TTE was benign, episode thought to be vasovagal.   Patient presents today for EVELYN 8 weeks post op to assess BHARTI clip.  ?  ?  ?  Today, he reports feeling tired but overall ok. Reports occasional mid sternal incisional pain; he is not taking any pain medication for it. Reports mild PARRA when walking fast and occasional lightheadedness. States he has not been sleeping that well because he is used to sleeping on his side. Denies SOB at rest, palpitations and LE edema. He is walking daily for exercise. He still smokes marijuana daily. Denies smoking cigarettes, excessive alcohol and illicit drug use.  ?  Cardiology Summary       Stress Test: 06/16/2022 pharm nuclear stress test, no evidence of ischemia      Echo: < from: TTE Echo Complete w/o Contrast w/ Doppler (06.29.23 @ 13:37) >  PHYSICIAN INTERPRETATION:  Left Ventricle: The left ventricular internal cavity size is normal.  Global LV systolic function was normal. Left ventricular ejection   fraction, by visual estimation, is 60 to 65%.  Right Ventricle: Normal right ventricular size and function. TV S' 0.1   m/s.  Left Atrium: Mildly enlarged left atrium.  Right Atrium: Normal right atrial size. Chiari network noted in RA.  Pericardium: There is no evidence of pericardial effusion.  Mitral Valve: The mitral valve is normal in structure. Trace mitral valve   regurgitation is seen.  Tricuspid Valve: The tricuspid valve is normal in structure. No tricuspid   regurgitation is visualized.  Aortic Valve: The aortic valve is trileaflet. No evidence of aortic valve   regurgitation is seen.  Pulmonic Valve: No indication of pulmonic valve regurgitation.  Aorta: Aortic root measured at Sinus of Valsalva is normal.  Pulmonary Artery: The pulmonary artery is of normal size and origin.  Venous: The inferior vena cava is normal.      Summary:   1. Left ventricular ejection fraction, by visual estimation, is 60 to   65%.   2. Normal global left ventricular systolic function.   3. Normal left ventricular internal cavity size.   4. There is mildconcentric left ventricular hypertrophy.   5. Mildly enlarged left atrium.   6. Normal right atrial size.   7. There is no evidence of pericardial effusion.   8. Trace mitral valve regurgitation.   9. Post-op CABG x 3 on 6/9/23.  10. See previous studies for comparison.    MD Rosanna Electronically signed on 6/29/2023 at 4:32:21 PM    < end of copied text >    < from: Cardiac Catheterization (06.08.23 @ 10:43) >  Diagnostic Findings:     Coronary Angiography   The coronary circulation is right dominant. Cardiac catheterization  was performed electively.    LM   Left main artery: The segment is visually normal in size and  structure.    LAD   Ostial left anterior descending: There is an 80 % De Danika stenosis.  Mid left anterior descending: There is an 80 % calcified  stenosis. Intravascular ultrasound was performed. Based on the  results, the lesion was judged to be significant. Imaging shows  a calcified lesion. Left anterior descending artery: The segment is  large. First diagonal: The segment is average sized. There is  a 70 % De Danika stenosis in the proximal third portion of the segment.      CX   Circumflex: The segment is large. Angiography shows moderate  atherosclerosis. First obtuse marginal: The segment is  average sized. Angiography showsmild atherosclerosis. Second obtuse  marginal: There is a 70 % stenosis in the proximal  third portion of the segment.      RCA   Right coronary artery: The segment is large, dominant. Angiography  shows minor irregularities.    Recommendations:     - CT surgical evaluation for CABG vs high risk PCI   - Cont. ASA-81 daily   - High intensity statin therapy     < end of copied text >

## 2023-08-17 NOTE — H&P PST ADULT - ASSESSMENT
S/P BHARTI clip for EVELYN 8 weeks post to assess    -NPO for procedure   -Consent to be obtained by MD

## 2023-08-17 NOTE — H&P PST ADULT - NSICDXPASTMEDICALHX_GEN_ALL_CORE_FT
PAST MEDICAL HISTORY:  Afib     CAD (coronary artery disease)     History of right hip replacement     HTN (hypertension)     Obesity, morbid     Osteoarthritis

## 2023-08-18 ENCOUNTER — NON-APPOINTMENT (OUTPATIENT)
Age: 58
End: 2023-08-18

## 2023-08-18 ENCOUNTER — TRANSCRIPTION ENCOUNTER (OUTPATIENT)
Age: 58
End: 2023-08-18

## 2023-08-18 ENCOUNTER — OUTPATIENT (OUTPATIENT)
Dept: OUTPATIENT SERVICES | Facility: HOSPITAL | Age: 58
LOS: 1 days | End: 2023-08-18
Payer: COMMERCIAL

## 2023-08-18 VITALS
HEART RATE: 58 BPM | OXYGEN SATURATION: 98 % | RESPIRATION RATE: 20 BRPM | TEMPERATURE: 98 F | SYSTOLIC BLOOD PRESSURE: 159 MMHG | DIASTOLIC BLOOD PRESSURE: 96 MMHG

## 2023-08-18 VITALS
DIASTOLIC BLOOD PRESSURE: 66 MMHG | RESPIRATION RATE: 16 BRPM | OXYGEN SATURATION: 97 % | SYSTOLIC BLOOD PRESSURE: 124 MMHG | HEART RATE: 60 BPM

## 2023-08-18 DIAGNOSIS — Z98.890 OTHER SPECIFIED POSTPROCEDURAL STATES: ICD-10-CM

## 2023-08-18 DIAGNOSIS — Z95.1 PRESENCE OF AORTOCORONARY BYPASS GRAFT: Chronic | ICD-10-CM

## 2023-08-18 DIAGNOSIS — Z98.890 OTHER SPECIFIED POSTPROCEDURAL STATES: Chronic | ICD-10-CM

## 2023-08-18 DIAGNOSIS — Z96.641 PRESENCE OF RIGHT ARTIFICIAL HIP JOINT: Chronic | ICD-10-CM

## 2023-08-18 LAB
ANION GAP SERPL CALC-SCNC: 11 MMOL/L — SIGNIFICANT CHANGE UP (ref 5–17)
ANISOCYTOSIS BLD QL: SIGNIFICANT CHANGE UP
APTT BLD: 38.8 SEC — HIGH (ref 24.5–35.6)
BASOPHILS # BLD AUTO: 0.04 K/UL — SIGNIFICANT CHANGE UP (ref 0–0.2)
BASOPHILS NFR BLD AUTO: 0.6 % — SIGNIFICANT CHANGE UP (ref 0–2)
BUN SERPL-MCNC: 21.4 MG/DL — HIGH (ref 8–20)
CALCIUM SERPL-MCNC: 9 MG/DL — SIGNIFICANT CHANGE UP (ref 8.4–10.5)
CHLORIDE SERPL-SCNC: 105 MMOL/L — SIGNIFICANT CHANGE UP (ref 96–108)
CO2 SERPL-SCNC: 23 MMOL/L — SIGNIFICANT CHANGE UP (ref 22–29)
CREAT SERPL-MCNC: 0.94 MG/DL — SIGNIFICANT CHANGE UP (ref 0.5–1.3)
EGFR: 95 ML/MIN/1.73M2 — SIGNIFICANT CHANGE UP
ELLIPTOCYTES BLD QL SMEAR: SIGNIFICANT CHANGE UP
EOSINOPHIL # BLD AUTO: 0.28 K/UL — SIGNIFICANT CHANGE UP (ref 0–0.5)
EOSINOPHIL NFR BLD AUTO: 4.4 % — SIGNIFICANT CHANGE UP (ref 0–6)
GLUCOSE SERPL-MCNC: 108 MG/DL — HIGH (ref 70–99)
HCT VFR BLD CALC: 35.3 % — LOW (ref 39–50)
HGB BLD-MCNC: 10.7 G/DL — LOW (ref 13–17)
HYPOCHROMIA BLD QL: SLIGHT — SIGNIFICANT CHANGE UP
IMM GRANULOCYTES NFR BLD AUTO: 0.3 % — SIGNIFICANT CHANGE UP (ref 0–0.9)
INR BLD: 1.17 RATIO — SIGNIFICANT CHANGE UP (ref 0.85–1.18)
LYMPHOCYTES # BLD AUTO: 1.89 K/UL — SIGNIFICANT CHANGE UP (ref 1–3.3)
LYMPHOCYTES # BLD AUTO: 29.7 % — SIGNIFICANT CHANGE UP (ref 13–44)
MANUAL SMEAR VERIFICATION: SIGNIFICANT CHANGE UP
MCHC RBC-ENTMCNC: 22.6 PG — LOW (ref 27–34)
MCHC RBC-ENTMCNC: 30.3 GM/DL — LOW (ref 32–36)
MCV RBC AUTO: 74.5 FL — LOW (ref 80–100)
MICROCYTES BLD QL: SIGNIFICANT CHANGE UP
MONOCYTES # BLD AUTO: 0.61 K/UL — SIGNIFICANT CHANGE UP (ref 0–0.9)
MONOCYTES NFR BLD AUTO: 9.6 % — SIGNIFICANT CHANGE UP (ref 2–14)
NEUTROPHILS # BLD AUTO: 3.53 K/UL — SIGNIFICANT CHANGE UP (ref 1.8–7.4)
NEUTROPHILS NFR BLD AUTO: 55.4 % — SIGNIFICANT CHANGE UP (ref 43–77)
OVALOCYTES BLD QL SMEAR: SIGNIFICANT CHANGE UP
PLAT MORPH BLD: NORMAL — SIGNIFICANT CHANGE UP
PLATELET # BLD AUTO: 256 K/UL — SIGNIFICANT CHANGE UP (ref 150–400)
POIKILOCYTOSIS BLD QL AUTO: SIGNIFICANT CHANGE UP
POLYCHROMASIA BLD QL SMEAR: SLIGHT — SIGNIFICANT CHANGE UP
POTASSIUM SERPL-MCNC: 4.1 MMOL/L — SIGNIFICANT CHANGE UP (ref 3.5–5.3)
POTASSIUM SERPL-SCNC: 4.1 MMOL/L — SIGNIFICANT CHANGE UP (ref 3.5–5.3)
PROTHROM AB SERPL-ACNC: 12.9 SEC — SIGNIFICANT CHANGE UP (ref 9.5–13)
RBC # BLD: 4.74 M/UL — SIGNIFICANT CHANGE UP (ref 4.2–5.8)
RBC # FLD: 16.9 % — HIGH (ref 10.3–14.5)
RBC BLD AUTO: ABNORMAL
SODIUM SERPL-SCNC: 139 MMOL/L — SIGNIFICANT CHANGE UP (ref 135–145)
WBC # BLD: 6.37 K/UL — SIGNIFICANT CHANGE UP (ref 3.8–10.5)
WBC # FLD AUTO: 6.37 K/UL — SIGNIFICANT CHANGE UP (ref 3.8–10.5)

## 2023-08-18 PROCEDURE — 93312 ECHO TRANSESOPHAGEAL: CPT

## 2023-08-18 PROCEDURE — 85025 COMPLETE CBC W/AUTO DIFF WBC: CPT

## 2023-08-18 PROCEDURE — 85730 THROMBOPLASTIN TIME PARTIAL: CPT

## 2023-08-18 PROCEDURE — 93320 DOPPLER ECHO COMPLETE: CPT

## 2023-08-18 PROCEDURE — 36415 COLL VENOUS BLD VENIPUNCTURE: CPT

## 2023-08-18 PROCEDURE — 85610 PROTHROMBIN TIME: CPT

## 2023-08-18 PROCEDURE — 80048 BASIC METABOLIC PNL TOTAL CA: CPT

## 2023-08-18 PROCEDURE — 93005 ELECTROCARDIOGRAM TRACING: CPT

## 2023-08-18 PROCEDURE — 93325 DOPPLER ECHO COLOR FLOW MAPG: CPT

## 2023-08-18 PROCEDURE — 93010 ELECTROCARDIOGRAM REPORT: CPT

## 2023-08-18 RX ORDER — ASPIRIN/CALCIUM CARB/MAGNESIUM 324 MG
1 TABLET ORAL
Refills: 0 | DISCHARGE

## 2023-08-18 RX ORDER — RIVAROXABAN 15 MG-20MG
1 KIT ORAL
Qty: 30 | Refills: 3
Start: 2023-08-18

## 2023-08-18 RX ORDER — RIVAROXABAN 15 MG-20MG
20 KIT ORAL
Refills: 0 | Status: DISCONTINUED | OUTPATIENT
Start: 2023-08-18 | End: 2023-09-01

## 2023-08-18 RX ADMIN — RIVAROXABAN 20 MILLIGRAM(S): KIT at 11:03

## 2023-08-18 NOTE — DISCHARGE NOTE PROVIDER - NSDCFUSCHEDAPPT_GEN_ALL_CORE_FT
Ben Sanchez  [facility] Physician [facility]  CARDIOLOGY [address]  Scheduled Appointment: 09/06/2023    Martin Alfaro  [facility]  ELECTROPH [address]  Scheduled Appointment: 09/20/2023    Ashlie Alba  [facility]  INTMED [address]  Scheduled Appointment: 10/04/2023

## 2023-08-18 NOTE — DISCHARGE NOTE PROVIDER - NSDCCPCAREPLAN_GEN_ALL_CORE_FT
PRINCIPAL DISCHARGE DIAGNOSIS  Diagnosis: History of left atrial appendage closure  Assessment and Plan of Treatment:

## 2023-08-18 NOTE — DISCHARGE NOTE PROVIDER - NSDCMRMEDTOKEN_GEN_ALL_CORE_FT
aspirin 81 mg oral tablet: 1 orally once a day  atorvastatin 80 mg oral tablet: 1 tab(s) orally once a day (at bedtime)  metoprolol succinate 100 mg oral tablet, extended release: 1 tab(s) orally once a day  rivaroxaban 20 mg oral tablet: 1 tab(s) orally once a day (before a meal)

## 2023-08-18 NOTE — DISCHARGE NOTE PROVIDER - CARE PROVIDER_API CALL
Selim, Samy Mohsen  Interventional Cardiology  [address], [address]  [place] [address]  Phone: [phone]  Fax: [phone]  Established Patient  Scheduled Appointment: 09/06/2023

## 2023-08-18 NOTE — DISCHARGE NOTE PROVIDER - NSDCACTIVITY_GEN_ALL_CORE
Do not drive or operate machinery/Do not make important decisions/Stairs allowed/Walking - Indoors allowed/Walking - Outdoors allowed/Follow Instructions Provided by your Surgical Team

## 2023-08-18 NOTE — PROGRESS NOTE ADULT - ASSESSMENT
A/P: 57 year old male daily Marijuana smoker with PMHx Afib, HTN, osteoarthritis, Right hip replacement which subsequently required 6 revisions (received IV ABX via PICC line completed approx 3/2023), presented to [facility] ED on 6/7/23 with unstable angina. Pt had a LHC on 6/8/23 with Dr. Sanchez which revealed Multivessel CAD. Patient electively admitted on 6/8/23 and underwent CABG x 3, Encompass procedure, and BHARTI clipping on 6/9/23 with Dr. Duke. Post-op course complicated by afib RVR (now converted to NSR with PO Amio load). on 6/28/2023, patient had a syncopal episode at home after coughing, he was advised to go to the ED. At the ED, EKG and trop were stable, TTE was benign, episode thought to be vasovagal.   Patient presents today for VEELYN 8 weeks post op to assess BHARTI clip.  Now s/p EVELYN with Dr. Medellin: s/p BHARTI clip remains open (prelim verbal report; official report to follow).    -NPO post EVELYN until fully awake then advance diet as tolerated  -Bedrest x 1 hour post procedure then OOB  -Discontinue Eliquis and switch to Xarelto secondary to noncompliance with BID dosing  -Resume all other meds as before  -Activity instructions discussed with patient verbal understanding  -Follow up with Dr. Sanchez in one to two weeks

## 2023-08-18 NOTE — DISCHARGE NOTE NURSING/CASE MANAGEMENT/SOCIAL WORK - PATIENT PORTAL LINK FT
You can access the FollowMyHealth Patient Portal offered by [facility] by registering at the following website: [email or web address]. By joining [email or web address]’s FollowMyHealth portal, you will also be able to view your health information using other applications (apps) compatible with our system.

## 2023-08-18 NOTE — ASU PATIENT PROFILE, ADULT - FALL HARM RISK - UNIVERSAL INTERVENTIONS
Bed in lowest position, wheels locked, appropriate side rails in place/Call bell, personal items and telephone in reach/Instruct patient to call for assistance before getting out of bed or chair/Non-slip footwear when patient is out of bed/[place] to call system/Physically safe environment - no spills, clutter or unnecessary equipment/Purposeful Proactive Rounding/Room/bathroom lighting operational, light cord in reach

## 2023-08-18 NOTE — DISCHARGE NOTE PROVIDER - HOSPITAL COURSE
57 year old male daily Marijuana smoker with PMHx Afib, HTN, osteoarthritis, Right hip replacement which subsequently required 6 revisions (received IV ABX via PICC line completed approx 3/2023), presented to [facility] ED on 6/7/23 with unstable angina. Pt had a LHC on 6/8/23 with Dr. Sanchez which revealed Multivessel CAD. Patient electively admitted on 6/8/23 and underwent CABG x 3, Encompass procedure, and BHARTI clipping on 6/9/23 with Dr. Duke. Post-op course complicated by afib RVR (now converted to NSR with PO Amio load). on 6/28/2023, patient had a syncopal episode at home after coughing, he was advised to go to the ED. At the ED, EKG and trop were stable, TTE was benign, episode thought to be vasovagal.   Patient presents today for EVELYN 8 weeks post op to assess BHARTI clip.  Now s/p EVELYN with Dr. Medellin: s/p BHARTI clip remains open (prelim verbal report; official report to follow).    -NPO post EVELYN until fully awake then advance diet as tolerated  -Bedrest x 1 hour post procedure then OOB  -Discontinue Eliquis and switch to Xarelto secondary to noncompliance with BID dosing  -Resume all other meds as before  -Activity instructions discussed with patient verbal understanding  -Follow up with Dr. Sanchez in one to two weeks     28-Nov-2016

## 2023-08-18 NOTE — PROGRESS NOTE ADULT - SUBJECTIVE AND OBJECTIVE BOX
Interventional Cardiology NP post procedure note:     -s/p EVELYN with Dr. Medellin: s/p BHARTI clip remains open (prelim verbal report; official report to follow)      MEDICATIONS  (STANDING):  rivaroxaban 20 milliGRAM(s) Oral with dinner      Allergies:  No Known Allergies      PAST MEDICAL & SURGICAL HISTORY:  HTN (hypertension)      Afib      Osteoarthritis      History of right hip replacement      Obesity, morbid      CAD (coronary artery disease)      S/P hip replacement, right      S/P CABG x 3      S/P left atrial appendage ligation          Vital Signs Last 24 Hrs  T(C): 36.4 (18 Aug 2023 08:27), Max: 36.4 (18 Aug 2023 08:27)  T(F): 97.6 (18 Aug 2023 08:27), Max: 97.6 (18 Aug 2023 08:27)  HR: 60 (18 Aug 2023 10:25) (57 - 60)  BP: 109/70 (18 Aug 2023 10:25) (97/56 - 159/96)  BP(mean): --  RR: 16 (18 Aug 2023 10:25) (16 - 20)  SpO2: 96% (18 Aug 2023 10:25) (93% - 98%)    Parameters below as of 18 Aug 2023 10:25  Patient On (Oxygen Delivery Method): room air        Physical Exam:  Constitutional: NAD, AAOx3  Cardiovascular: +S1S2 RRR  Pulmonary: CTA b/l, unlabored  GI: soft NTND +BS  Extremities: no pedal edema, +distal pulses b/l  Neuro: non focal, JARA x4    LABS:                        10.7   6.37  )-----------( 256      ( 18 Aug 2023 08:07 )             35.3     08-18    139  |  105  |  21.4<H>  ----------------------------<  108<H>  4.1   |  23.0  |  0.94    Ca    9.0      18 Aug 2023 08:07      PT/INR - ( 18 Aug 2023 08:07 )   PT: 12.9 sec;   INR: 1.17 ratio         PTT - ( 18 Aug 2023 08:07 )  PTT:38.8 sec  Urinalysis Basic - ( 18 Aug 2023 08:07 )    Color: x / Appearance: x / SG: x / pH: x  Gluc: 108 mg/dL / Ketone: x  / Bili: x / Urobili: x   Blood: x / Protein: x / Nitrite: x   Leuk Esterase: x / RBC: x / WBC x   Sq Epi: x / Non Sq Epi: x / Bacteria: x        RADIOLOGY & ADDITIONAL TESTS:

## 2023-08-18 NOTE — DISCHARGE NOTE PROVIDER - NSDCCPTREATMENT_GEN_ALL_CORE_FT
PRINCIPAL PROCEDURE  Procedure: Complete transesophageal echocardiography (EVELYN) with Doppler imaging  Findings and Treatment: Notify your doctor if you develop a fever, cough up blood, have any chest pain, palpitations or difficulty breathing. You may take a throat lozenge if you develop a sore throat or try warm salt water gargle. Resume your diet with soft foods first. Follow up with your cardiologist within 2 weeks for a follow up or sooner with any concerns. Report to the nearest ER with any emergencies.

## 2023-08-19 PROCEDURE — 93312 ECHO TRANSESOPHAGEAL: CPT | Mod: 26

## 2023-08-19 PROCEDURE — 93325 DOPPLER ECHO COLOR FLOW MAPG: CPT | Mod: 26

## 2023-08-19 PROCEDURE — 93320 DOPPLER ECHO COMPLETE: CPT | Mod: 26

## 2023-08-23 ENCOUNTER — NON-APPOINTMENT (OUTPATIENT)
Age: 58
End: 2023-08-23

## 2023-09-11 PROBLEM — I25.10 ATHEROSCLEROTIC HEART DISEASE OF NATIVE CORONARY ARTERY WITHOUT ANGINA PECTORIS: Chronic | Status: ACTIVE | Noted: 2023-08-17

## 2023-09-15 ENCOUNTER — APPOINTMENT (OUTPATIENT)
Dept: CARDIOLOGY | Facility: CLINIC | Age: 58
End: 2023-09-15

## 2023-09-20 ENCOUNTER — NON-APPOINTMENT (OUTPATIENT)
Age: 58
End: 2023-09-20

## 2023-09-20 ENCOUNTER — APPOINTMENT (OUTPATIENT)
Dept: ELECTROPHYSIOLOGY | Facility: CLINIC | Age: 58
End: 2023-09-20
Payer: MEDICAID

## 2023-09-20 VITALS
HEART RATE: 71 BPM | OXYGEN SATURATION: 98 % | SYSTOLIC BLOOD PRESSURE: 142 MMHG | WEIGHT: 263 LBS | HEIGHT: 73 IN | DIASTOLIC BLOOD PRESSURE: 70 MMHG | BODY MASS INDEX: 34.85 KG/M2

## 2023-09-20 PROCEDURE — 93000 ELECTROCARDIOGRAM COMPLETE: CPT

## 2023-09-20 PROCEDURE — 99214 OFFICE O/P EST MOD 30 MIN: CPT | Mod: 25

## 2023-09-20 RX ORDER — APIXABAN 5 MG/1
5 TABLET, FILM COATED ORAL
Qty: 180 | Refills: 2 | Status: DISCONTINUED | COMMUNITY
Start: 2023-06-19 | End: 2023-09-20

## 2023-09-20 RX ORDER — AMIODARONE HYDROCHLORIDE 200 MG/1
200 TABLET ORAL DAILY
Qty: 90 | Refills: 1 | Status: DISCONTINUED | COMMUNITY
Start: 2023-06-19 | End: 2023-09-20

## 2023-09-25 ENCOUNTER — APPOINTMENT (OUTPATIENT)
Dept: CARDIOLOGY | Facility: CLINIC | Age: 58
End: 2023-09-25
Payer: MEDICAID

## 2023-09-25 PROCEDURE — 93798 PHYS/QHP OP CAR RHAB W/ECG: CPT

## 2023-09-27 ENCOUNTER — APPOINTMENT (OUTPATIENT)
Dept: CARDIOLOGY | Facility: CLINIC | Age: 58
End: 2023-09-27
Payer: MEDICAID

## 2023-09-27 VITALS
TEMPERATURE: 97.6 F | BODY MASS INDEX: 33.91 KG/M2 | SYSTOLIC BLOOD PRESSURE: 122 MMHG | DIASTOLIC BLOOD PRESSURE: 81 MMHG | OXYGEN SATURATION: 95 % | WEIGHT: 257 LBS | HEART RATE: 63 BPM

## 2023-09-27 PROCEDURE — 99215 OFFICE O/P EST HI 40 MIN: CPT | Mod: 25

## 2023-09-27 PROCEDURE — 93798 PHYS/QHP OP CAR RHAB W/ECG: CPT

## 2023-09-27 PROCEDURE — 93000 ELECTROCARDIOGRAM COMPLETE: CPT

## 2023-10-02 ENCOUNTER — APPOINTMENT (OUTPATIENT)
Dept: CARDIOLOGY | Facility: CLINIC | Age: 58
End: 2023-10-02

## 2023-10-04 ENCOUNTER — APPOINTMENT (OUTPATIENT)
Dept: CARDIOLOGY | Facility: CLINIC | Age: 58
End: 2023-10-04
Payer: MEDICAID

## 2023-10-04 PROCEDURE — 93798 PHYS/QHP OP CAR RHAB W/ECG: CPT

## 2023-10-09 ENCOUNTER — APPOINTMENT (OUTPATIENT)
Dept: CARDIOLOGY | Facility: CLINIC | Age: 58
End: 2023-10-09
Payer: MEDICAID

## 2023-10-09 PROCEDURE — 93798 PHYS/QHP OP CAR RHAB W/ECG: CPT

## 2023-10-11 ENCOUNTER — APPOINTMENT (OUTPATIENT)
Dept: CARDIOLOGY | Facility: CLINIC | Age: 58
End: 2023-10-11

## 2023-10-16 ENCOUNTER — APPOINTMENT (OUTPATIENT)
Dept: CARDIOLOGY | Facility: CLINIC | Age: 58
End: 2023-10-16
Payer: MEDICAID

## 2023-10-16 PROCEDURE — 93798 PHYS/QHP OP CAR RHAB W/ECG: CPT

## 2023-10-18 ENCOUNTER — APPOINTMENT (OUTPATIENT)
Dept: CARDIOLOGY | Facility: CLINIC | Age: 58
End: 2023-10-18
Payer: MEDICAID

## 2023-10-18 PROCEDURE — 93798 PHYS/QHP OP CAR RHAB W/ECG: CPT

## 2023-10-19 ENCOUNTER — LABORATORY RESULT (OUTPATIENT)
Age: 58
End: 2023-10-19

## 2023-10-19 ENCOUNTER — APPOINTMENT (OUTPATIENT)
Dept: INTERNAL MEDICINE | Facility: CLINIC | Age: 58
End: 2023-10-19
Payer: MEDICAID

## 2023-10-19 VITALS
HEART RATE: 63 BPM | OXYGEN SATURATION: 98 % | RESPIRATION RATE: 16 BRPM | SYSTOLIC BLOOD PRESSURE: 139 MMHG | WEIGHT: 265 LBS | BODY MASS INDEX: 35.12 KG/M2 | TEMPERATURE: 97.1 F | HEIGHT: 73 IN | DIASTOLIC BLOOD PRESSURE: 90 MMHG

## 2023-10-19 DIAGNOSIS — Z12.5 ENCOUNTER FOR SCREENING FOR MALIGNANT NEOPLASM OF PROSTATE: ICD-10-CM

## 2023-10-19 DIAGNOSIS — D64.9 ANEMIA, UNSPECIFIED: ICD-10-CM

## 2023-10-19 DIAGNOSIS — E66.9 OBESITY, UNSPECIFIED: ICD-10-CM

## 2023-10-19 PROCEDURE — 99214 OFFICE O/P EST MOD 30 MIN: CPT | Mod: 25

## 2023-10-19 RX ORDER — ATORVASTATIN CALCIUM 80 MG/1
80 TABLET, FILM COATED ORAL DAILY
Qty: 90 | Refills: 3 | Status: ACTIVE | COMMUNITY
Start: 2023-06-19

## 2023-10-20 DIAGNOSIS — D50.9 IRON DEFICIENCY ANEMIA, UNSPECIFIED: ICD-10-CM

## 2023-10-20 DIAGNOSIS — R74.8 ABNORMAL LEVELS OF OTHER SERUM ENZYMES: ICD-10-CM

## 2023-10-20 LAB
CHOLEST SERPL-MCNC: 126 MG/DL
CREAT SPEC-SCNC: 177 MG/DL
ESTIMATED AVERAGE GLUCOSE: 114 MG/DL
HBA1C MFR BLD HPLC: 5.6 %
HDLC SERPL-MCNC: 33 MG/DL
LDLC SERPL CALC-MCNC: 74 MG/DL
MICROALBUMIN 24H UR DL<=1MG/L-MCNC: <1.2 MG/DL
MICROALBUMIN/CREAT 24H UR-RTO: NORMAL MG/G
NONHDLC SERPL-MCNC: 93 MG/DL
TRIGL SERPL-MCNC: 102 MG/DL
TSH SERPL-ACNC: 2.24 UIU/ML

## 2023-10-22 LAB — PSA SERPL-MCNC: 0.77 NG/ML

## 2023-10-23 ENCOUNTER — APPOINTMENT (OUTPATIENT)
Dept: CARDIOLOGY | Facility: CLINIC | Age: 58
End: 2023-10-23
Payer: MEDICAID

## 2023-10-23 LAB
ALBUMIN SERPL ELPH-MCNC: 4.2 G/DL
ALP BLD-CCNC: 165 U/L
ALT SERPL-CCNC: 25 U/L
ANION GAP SERPL CALC-SCNC: 10 MMOL/L
AST SERPL-CCNC: 26 U/L
BASOPHILS # BLD AUTO: 0.06 K/UL
BASOPHILS NFR BLD AUTO: 0.9 %
BILIRUB SERPL-MCNC: 0.2 MG/DL
BUN SERPL-MCNC: 21 MG/DL
CALCIUM SERPL-MCNC: 9.1 MG/DL
CHLORIDE SERPL-SCNC: 107 MMOL/L
CO2 SERPL-SCNC: 22 MMOL/L
CREAT SERPL-MCNC: 0.99 MG/DL
EGFR: 89 ML/MIN/1.73M2
EOSINOPHIL # BLD AUTO: 0.06 K/UL
EOSINOPHIL NFR BLD AUTO: 0.9 %
FERRITIN SERPL-MCNC: 19 NG/ML
GGT SERPL-CCNC: 41 U/L
GLUCOSE SERPL-MCNC: 91 MG/DL
HCT VFR BLD CALC: 40.5 %
HGB BLD-MCNC: 11.6 G/DL
IRON SATN MFR SERPL: 9 %
IRON SERPL-MCNC: 40 UG/DL
LYMPHOCYTES # BLD AUTO: 2.46 K/UL
LYMPHOCYTES NFR BLD AUTO: 38.6 %
MAN DIFF?: NORMAL
MCHC RBC-ENTMCNC: 24.3 PG
MCHC RBC-ENTMCNC: 28.6 GM/DL
MCV RBC AUTO: 84.7 FL
MONOCYTES # BLD AUTO: 0.39 K/UL
MONOCYTES NFR BLD AUTO: 6.1 %
NEUTROPHILS # BLD AUTO: 3.3 K/UL
NEUTROPHILS NFR BLD AUTO: 50.9 %
PLATELET # BLD AUTO: 292 K/UL
POTASSIUM SERPL-SCNC: 4.6 MMOL/L
PROT SERPL-MCNC: 7.4 G/DL
RBC # BLD: 4.78 M/UL
RBC # FLD: 20.6 %
SODIUM SERPL-SCNC: 139 MMOL/L
TIBC SERPL-MCNC: 436 UG/DL
UIBC SERPL-MCNC: 396 UG/DL
WBC # FLD AUTO: 6.38 K/UL

## 2023-10-23 PROCEDURE — 93798 PHYS/QHP OP CAR RHAB W/ECG: CPT

## 2023-10-25 ENCOUNTER — APPOINTMENT (OUTPATIENT)
Dept: CARDIOLOGY | Facility: CLINIC | Age: 58
End: 2023-10-25

## 2023-10-30 ENCOUNTER — APPOINTMENT (OUTPATIENT)
Dept: CARDIOLOGY | Facility: CLINIC | Age: 58
End: 2023-10-30
Payer: MEDICAID

## 2023-10-30 PROCEDURE — 93798 PHYS/QHP OP CAR RHAB W/ECG: CPT

## 2023-11-01 ENCOUNTER — APPOINTMENT (OUTPATIENT)
Dept: CARDIOLOGY | Facility: CLINIC | Age: 58
End: 2023-11-01

## 2023-11-06 ENCOUNTER — APPOINTMENT (OUTPATIENT)
Dept: CARDIOLOGY | Facility: CLINIC | Age: 58
End: 2023-11-06

## 2023-11-06 RX ORDER — RIVAROXABAN 20 MG/1
20 TABLET, FILM COATED ORAL
Qty: 90 | Refills: 3 | Status: ACTIVE | COMMUNITY
Start: 1900-01-01 | End: 1900-01-01

## 2023-11-08 ENCOUNTER — APPOINTMENT (OUTPATIENT)
Dept: CARDIOLOGY | Facility: CLINIC | Age: 58
End: 2023-11-08

## 2023-11-13 ENCOUNTER — APPOINTMENT (OUTPATIENT)
Dept: CARDIOLOGY | Facility: CLINIC | Age: 58
End: 2023-11-13

## 2023-11-15 ENCOUNTER — APPOINTMENT (OUTPATIENT)
Dept: CARDIOLOGY | Facility: CLINIC | Age: 58
End: 2023-11-15

## 2023-11-20 ENCOUNTER — APPOINTMENT (OUTPATIENT)
Dept: CARDIOLOGY | Facility: CLINIC | Age: 58
End: 2023-11-20

## 2023-11-22 ENCOUNTER — APPOINTMENT (OUTPATIENT)
Dept: CARDIOLOGY | Facility: CLINIC | Age: 58
End: 2023-11-22

## 2023-11-27 ENCOUNTER — APPOINTMENT (OUTPATIENT)
Dept: CARDIOLOGY | Facility: CLINIC | Age: 58
End: 2023-11-27

## 2023-11-29 ENCOUNTER — APPOINTMENT (OUTPATIENT)
Dept: CARDIOLOGY | Facility: CLINIC | Age: 58
End: 2023-11-29

## 2023-12-01 PROBLEM — B37.2 CANDIDIASIS, INTERTRIGINOUS: Status: ACTIVE | Noted: 2022-06-01

## 2023-12-01 PROBLEM — R55 SYNCOPE: Status: ACTIVE | Noted: 2023-07-07

## 2023-12-04 ENCOUNTER — APPOINTMENT (OUTPATIENT)
Dept: CARDIOLOGY | Facility: CLINIC | Age: 58
End: 2023-12-04

## 2023-12-06 ENCOUNTER — APPOINTMENT (OUTPATIENT)
Dept: CARDIOLOGY | Facility: CLINIC | Age: 58
End: 2023-12-06

## 2023-12-11 ENCOUNTER — APPOINTMENT (OUTPATIENT)
Dept: CARDIOLOGY | Facility: CLINIC | Age: 58
End: 2023-12-11

## 2023-12-13 ENCOUNTER — APPOINTMENT (OUTPATIENT)
Dept: CARDIOLOGY | Facility: CLINIC | Age: 58
End: 2023-12-13

## 2023-12-18 ENCOUNTER — APPOINTMENT (OUTPATIENT)
Dept: CARDIOLOGY | Facility: CLINIC | Age: 58
End: 2023-12-18

## 2024-02-13 ENCOUNTER — RX RENEWAL (OUTPATIENT)
Age: 59
End: 2024-02-13

## 2024-02-27 ENCOUNTER — NON-APPOINTMENT (OUTPATIENT)
Age: 59
End: 2024-02-27

## 2024-02-27 ENCOUNTER — APPOINTMENT (OUTPATIENT)
Dept: CARDIOLOGY | Facility: CLINIC | Age: 59
End: 2024-02-27
Payer: MEDICAID

## 2024-02-27 VITALS
HEIGHT: 73 IN | SYSTOLIC BLOOD PRESSURE: 118 MMHG | HEART RATE: 65 BPM | BODY MASS INDEX: 33.8 KG/M2 | DIASTOLIC BLOOD PRESSURE: 80 MMHG | WEIGHT: 255 LBS | OXYGEN SATURATION: 97 %

## 2024-02-27 DIAGNOSIS — I25.10 ATHEROSCLEROTIC HEART DISEASE OF NATIVE CORONARY ARTERY W/OUT ANGINA PECTORIS: ICD-10-CM

## 2024-02-27 DIAGNOSIS — R07.89 OTHER CHEST PAIN: ICD-10-CM

## 2024-02-27 PROCEDURE — 93000 ELECTROCARDIOGRAM COMPLETE: CPT

## 2024-02-27 PROCEDURE — 99214 OFFICE O/P EST MOD 30 MIN: CPT | Mod: 25

## 2024-04-10 RX ORDER — METOPROLOL SUCCINATE 100 MG/1
100 TABLET, EXTENDED RELEASE ORAL DAILY
Qty: 90 | Refills: 0 | Status: ACTIVE | COMMUNITY
Start: 2024-02-13 | End: 1900-01-01

## 2024-05-21 ENCOUNTER — APPOINTMENT (OUTPATIENT)
Dept: CARDIOLOGY | Facility: CLINIC | Age: 59
End: 2024-05-21

## 2024-05-21 VITALS
HEART RATE: 92 BPM | SYSTOLIC BLOOD PRESSURE: 124 MMHG | BODY MASS INDEX: 34.54 KG/M2 | DIASTOLIC BLOOD PRESSURE: 82 MMHG | OXYGEN SATURATION: 96 % | HEIGHT: 72 IN | TEMPERATURE: 98.6 F | WEIGHT: 255 LBS

## 2024-05-21 DIAGNOSIS — Z95.1 PRESENCE OF AORTOCORONARY BYPASS GRAFT: ICD-10-CM

## 2024-05-21 DIAGNOSIS — Z98.890 OTHER SPECIFIED POSTPROCEDURAL STATES: ICD-10-CM

## 2024-05-21 DIAGNOSIS — I48.0 PAROXYSMAL ATRIAL FIBRILLATION: ICD-10-CM

## 2024-05-21 DIAGNOSIS — I10 ESSENTIAL (PRIMARY) HYPERTENSION: ICD-10-CM

## 2024-05-21 PROCEDURE — 99214 OFFICE O/P EST MOD 30 MIN: CPT

## 2024-05-21 NOTE — CARDIOLOGY SUMMARY
[de-identified] : 2/27/2024 Sinus Bradycardia, rsr, no st/t changes  9/272/203 NSR, within normal  [de-identified] : 06/16/2022 pharm nuclear stress test, no evidence of ischemia  [de-identified] : 06/16/2022 LVEF 50-55%, normal RV size and function, moderate concentric LVH, LA normal in size, RA mildly dilated

## 2024-05-21 NOTE — HISTORY OF PRESENT ILLNESS
[FreeTextEntry1] : 57 year old male daily Marijuana smoker with PMHx Afib, HTN, osteoarthritis, Right hip replacement which subsequently required 6 revisions (received IV ABX via PICC line completed approx 3/2023), presented to [facility] ED on 6/7/23 with unstable angina. Pt had a LHC on 6/8/23 with Dr. Sanchez which revealed Multivessel CAD. Patient electively admitted on 6/8/23 and underwent CABG x 3, Encompass procedure, and BHARTI clipping on 6/9/23 with Dr. Duke. Post-op course complicated by afib RVR (now converted to NSR with PO Amio load). on 6/28/2023, patient had a syncopal episode at home after coughing, he was advised to go to the ED. At the ED, EKG and trop were stable, TTE was benign, episode thought to be vasovagal.   9/27/2023 Returns for follow up . started cardiac rehab.  some chest pain. no exertional SOB, palpitations, dizziness or syncope doesn't work and feels he is unable to do because chest pain, undergoing cardiac rehab and right LE discomfort.  He works as a   2/27/2024 Patient here for follow up. He c/o midsternal chest pain yesterday. Describes as a sharp pain, lasted 30-50 seconds. 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 Sometimes feels it if he moves a certain way. Denies associated SOB, palpitations, radiation, lightheadedness, N/V, or syncope. He denies chest pain with walking or activity. Reports this is different from his CP prior to CABG, at that time he had burning chest pain with activity. Denies ever smoking cigarettes, but does smoke marijuana daily. Drinks alcohol rarely. Admits to poor diet the last couple months. Pt planning to move out of state in a couple of months.   5/21/2024 REturns for follow up . His prior retrosternal chest pain improved . He is moving to [place]  no exertional chest pain or  SOB, palpitations, dizziness or syncope He is inquiring about the need to be on Xarelto  CHADSVASC 1 ( HTN )  HOlter monitor in 9/2023 showed no recurrent AFib.

## 2024-05-21 NOTE — ASSESSMENT
[FreeTextEntry1] :   Assessment/ Plan: 1. CAD s/p CABGx3: Completed cardiac rehab. chest pain improved, continue ASA 81 mg and atorvastatin 80 mg and metoprolol  2. Afib: s/p BHARTI clip. Echo showed flow in clipped LA appendage and CHADSVASC , will discuss with EP the need for switching to antiplatelets vs anticoagulation since Xarelto is expensive for the patient

## 2024-05-21 NOTE — PHYSICAL EXAM
[Obese] : obese [No Carotid Bruit] : no carotid bruit [Normal S1, S2] : normal S1, S2 [No Murmur] : no murmur [Clear Lung Fields] : clear lung fields [No Respiratory Distress] : no respiratory distress  [Normal Gait] : normal gait [No Edema] : no edema [Normal PT B/L] : normal PT B/L [Moves all extremities] : moves all extremities [Normal Speech] : normal speech [Alert and Oriented] : alert and oriented [Normal memory] : normal memory [de-identified] : sternotomy incision well healed

## 2024-05-21 NOTE — REVIEW OF SYSTEMS
[Chest Discomfort] : chest discomfort [Negative] : Respiratory [SOB] : no shortness of breath [Dyspnea on exertion] : not dyspnea during exertion [Lower Ext Edema] : no extremity edema [Palpitations] : no palpitations [Orthopnea] : no orthopnea [Syncope] : no syncope [Blood in stool] : no blood in stoo [Hematuria] : no hematuria [Dizziness] : no dizziness [Easy Bleeding] : no tendency for easy bleeding

## 2024-07-23 ENCOUNTER — APPOINTMENT (OUTPATIENT)
Dept: INTERNAL MEDICINE | Facility: CLINIC | Age: 59
End: 2024-07-23
Payer: MEDICAID

## 2024-07-23 ENCOUNTER — NON-APPOINTMENT (OUTPATIENT)
Age: 59
End: 2024-07-23

## 2024-07-23 VITALS
HEIGHT: 72 IN | BODY MASS INDEX: 34.27 KG/M2 | OXYGEN SATURATION: 98 % | TEMPERATURE: 97.1 F | SYSTOLIC BLOOD PRESSURE: 130 MMHG | HEART RATE: 102 BPM | RESPIRATION RATE: 16 BRPM | DIASTOLIC BLOOD PRESSURE: 80 MMHG | WEIGHT: 253 LBS

## 2024-07-23 DIAGNOSIS — R74.8 ABNORMAL LEVELS OF OTHER SERUM ENZYMES: ICD-10-CM

## 2024-07-23 DIAGNOSIS — D50.9 IRON DEFICIENCY ANEMIA, UNSPECIFIED: ICD-10-CM

## 2024-07-23 DIAGNOSIS — B37.2 CANDIDIASIS OF SKIN AND NAIL: ICD-10-CM

## 2024-07-23 DIAGNOSIS — D64.9 ANEMIA, UNSPECIFIED: ICD-10-CM

## 2024-07-23 DIAGNOSIS — R07.89 OTHER CHEST PAIN: ICD-10-CM

## 2024-07-23 PROCEDURE — 93000 ELECTROCARDIOGRAM COMPLETE: CPT

## 2024-07-23 PROCEDURE — 99215 OFFICE O/P EST HI 40 MIN: CPT | Mod: 25

## 2024-07-23 NOTE — HISTORY OF PRESENT ILLNESS
[FreeTextEntry8] : 59-year-old male here today with complaint of a painful itchy rash that began about 1mo ago Denies fever, chills or purulent fluid worse since onset similar to rash in 2022  In addition, patient also reporting that he woke up this morning with substernal chest pain and shortness of breath made worse with exertion Currently reports almost near resolution of his chest discomfort.  Denies lower extremity edema

## 2024-07-24 ENCOUNTER — NON-APPOINTMENT (OUTPATIENT)
Age: 59
End: 2024-07-24

## 2024-07-24 ENCOUNTER — APPOINTMENT (OUTPATIENT)
Dept: CARDIOLOGY | Facility: CLINIC | Age: 59
End: 2024-07-24
Payer: MEDICAID

## 2024-07-24 VITALS
DIASTOLIC BLOOD PRESSURE: 70 MMHG | SYSTOLIC BLOOD PRESSURE: 109 MMHG | HEIGHT: 72 IN | WEIGHT: 252 LBS | OXYGEN SATURATION: 96 % | BODY MASS INDEX: 34.13 KG/M2 | HEART RATE: 80 BPM

## 2024-07-24 DIAGNOSIS — I10 ESSENTIAL (PRIMARY) HYPERTENSION: ICD-10-CM

## 2024-07-24 DIAGNOSIS — I25.10 ATHEROSCLEROTIC HEART DISEASE OF NATIVE CORONARY ARTERY W/OUT ANGINA PECTORIS: ICD-10-CM

## 2024-07-24 DIAGNOSIS — Z95.1 PRESENCE OF AORTOCORONARY BYPASS GRAFT: ICD-10-CM

## 2024-07-24 DIAGNOSIS — I48.0 PAROXYSMAL ATRIAL FIBRILLATION: ICD-10-CM

## 2024-07-24 PROCEDURE — 93000 ELECTROCARDIOGRAM COMPLETE: CPT

## 2024-07-24 PROCEDURE — 99214 OFFICE O/P EST MOD 30 MIN: CPT | Mod: 25

## 2024-08-20 ENCOUNTER — APPOINTMENT (OUTPATIENT)
Dept: INTERNAL MEDICINE | Facility: CLINIC | Age: 59
End: 2024-08-20
Payer: MEDICAID

## 2024-08-20 VITALS
HEIGHT: 72 IN | DIASTOLIC BLOOD PRESSURE: 80 MMHG | HEART RATE: 72 BPM | SYSTOLIC BLOOD PRESSURE: 122 MMHG | OXYGEN SATURATION: 98 % | RESPIRATION RATE: 16 BRPM | TEMPERATURE: 97.2 F | WEIGHT: 267 LBS | BODY MASS INDEX: 36.16 KG/M2

## 2024-08-20 DIAGNOSIS — B37.2 CANDIDIASIS OF SKIN AND NAIL: ICD-10-CM

## 2024-08-20 DIAGNOSIS — L08.1 ERYTHRASMA: ICD-10-CM

## 2024-08-20 PROCEDURE — 99213 OFFICE O/P EST LOW 20 MIN: CPT

## 2024-08-20 RX ORDER — CLINDAMYCIN PHOSPHATE 1 G/10ML
1 GEL TOPICAL
Qty: 1 | Refills: 0 | Status: ACTIVE | COMMUNITY
Start: 2024-08-20 | End: 1900-01-01

## 2024-08-20 NOTE — HISTORY OF PRESENT ILLNESS
[FreeTextEntry8] : 59-year-old male here today with complaint of persistent painful full itchy rash that began about 1 month ago Was previously prescribed topical nystatin with triamcinolone Today, patient reports topical cream previously prescribed not resulting in improvement of symptoms

## 2024-08-20 NOTE — ASSESSMENT
[FreeTextEntry1] : Skin rash not improving with topical nystatin for presumed intertriginous candidiasis Upon reevaluation I question the alternative diagnosis of intertriginous erythrasma Stop nystatin Start topical clindamycin for the next 1 to 2 weeks If symptoms or not improving in the next 1 week he will call me and we will set up a telephone visit to discuss placing him on an oral antibiotic New or worsening symptoms despite the above recommendations patient to call office immediately for further instruction Dermatology consultation to be considered if this remains unresolved despite my recommendations

## 2024-08-27 NOTE — DISCHARGE NOTE NURSING/CASE MANAGEMENT/SOCIAL WORK - NSPROEXTENSIONSOFSELF_GEN_A_NUR
As discussed , Will make sure to check the needed blood markers for heart as well as lung clot if any causing your symptoms.     Will check Chest X ray if there is any lung related concerns causing your symptoms     Flonase nasal spray sent to pharmacy for possible sinusitis     Please do Eye exam for possible Vision causes to be excluded, referral placed    Will increase the dose of Lisinopril for better control of HTN which could cause headaches.     Please go to ER for worsening chest pain.     ====================================       none

## 2024-09-10 ENCOUNTER — APPOINTMENT (OUTPATIENT)
Dept: ELECTROPHYSIOLOGY | Facility: CLINIC | Age: 59
End: 2024-09-10
Payer: MEDICAID

## 2024-09-10 VITALS
HEIGHT: 72 IN | DIASTOLIC BLOOD PRESSURE: 100 MMHG | WEIGHT: 270 LBS | SYSTOLIC BLOOD PRESSURE: 144 MMHG | OXYGEN SATURATION: 97 % | BODY MASS INDEX: 36.57 KG/M2 | HEART RATE: 63 BPM

## 2024-09-10 VITALS — DIASTOLIC BLOOD PRESSURE: 84 MMHG | SYSTOLIC BLOOD PRESSURE: 132 MMHG

## 2024-09-10 VITALS — SYSTOLIC BLOOD PRESSURE: 138 MMHG | DIASTOLIC BLOOD PRESSURE: 88 MMHG

## 2024-09-10 DIAGNOSIS — I48.0 PAROXYSMAL ATRIAL FIBRILLATION: ICD-10-CM

## 2024-09-10 DIAGNOSIS — Z98.890 OTHER SPECIFIED POSTPROCEDURAL STATES: ICD-10-CM

## 2024-09-10 DIAGNOSIS — I48.92 UNSPECIFIED ATRIAL FLUTTER: ICD-10-CM

## 2024-09-10 DIAGNOSIS — I25.10 ATHEROSCLEROTIC HEART DISEASE OF NATIVE CORONARY ARTERY W/OUT ANGINA PECTORIS: ICD-10-CM

## 2024-09-10 DIAGNOSIS — Z95.1 PRESENCE OF AORTOCORONARY BYPASS GRAFT: ICD-10-CM

## 2024-09-10 PROCEDURE — 93000 ELECTROCARDIOGRAM COMPLETE: CPT

## 2024-09-10 PROCEDURE — 99215 OFFICE O/P EST HI 40 MIN: CPT | Mod: 25

## 2024-09-10 NOTE — HISTORY OF PRESENT ILLNESS
[FreeTextEntry1] : The patient is a 59-year-old male here for follow up today. The patient has a history of HTN, atrial fibrillation, and CAD. The patient underwent CABG and Encompass ablation/BHARTI clipping with Dr. Duke on 6/9/23. Post-op course was notable for atrial fibrillation with RVR which was treated with amoidarone leading to eventual conversion to sinus rhythm. The patient had a syncopal episode on 6/28/23 for which he went to the ER, which was diagnosed as vagal syncope.  A EVELYN performed on 8/18/23 revealed a residual left atrial appendage pouch with flow through it. He was previously on eliquis which was switched to xarelto. In 7/2024, the patient was again noted to be in symptomatic atrial flutter/fibrillation. He was sent to the ER but went home before being seen because of a long wait time. He eventually converted to sinus rhythm spontaneously. He denies any recurrent episodes since. He is on metoprolol 100 mg daily in addition to xarelto for stroke prophylaxis.

## 2024-09-10 NOTE — DISCUSSION/SUMMARY
[FreeTextEntry1] : In summary, the patient has 59-year-old male with a history of atrial fibrillation and CAD s/p CABG, Encompass ablation for AF and BHARTI clipping on 6/9/23 with [id number] pouch on EVELYN.  He has recurrent symptomatic atrial fibrillation and flutter. We discussed various therapeutic options including medications and catheter ablation. I recommended an ablation procedure as more likely to be successful long-term. We discussed the details of the procedure including potential complications which include but are not limited to bleeding, hematoma, infection, damage to blood vessels, cardiac perforation, damage to the conduction system requiring pacemaker, MI, stroke and DVT. The patient expressed understanding and wishes to proceed.  [EKG obtained to assist in diagnosis and management of assessed problem(s)] : EKG obtained to assist in diagnosis and management of assessed problem(s)

## 2024-09-10 NOTE — CARDIOLOGY SUMMARY
[de-identified] : 9/10/24: sinus rhythm [de-identified] : 9/10/24: EF 55-60%, LA enlargement, mild TR

## 2024-09-12 ENCOUNTER — APPOINTMENT (OUTPATIENT)
Dept: DERMATOLOGY | Facility: CLINIC | Age: 59
End: 2024-09-12
Payer: MEDICAID

## 2024-09-12 VITALS — HEIGHT: 73 IN

## 2024-09-12 DIAGNOSIS — B35.6 TINEA CRURIS: ICD-10-CM

## 2024-09-12 PROCEDURE — 99204 OFFICE O/P NEW MOD 45 MIN: CPT

## 2024-09-12 RX ORDER — FLUCONAZOLE 200 MG/1
200 TABLET ORAL
Qty: 3 | Refills: 0 | Status: ACTIVE | COMMUNITY
Start: 2024-09-12 | End: 1900-01-01

## 2024-09-13 NOTE — PHYSICAL EXAM
[FreeTextEntry3] : annular scaly plaques on the inguinal folds extending to the mons pubis and proximal thighs

## 2024-09-13 NOTE — ASSESSMENT
[FreeTextEntry1] : 1) Tinea cruris, moderate exacerbation of chronic disease  - Reviewed risks (as well as mitigation strategies for adverse drug events as applicable), benefits, and alternatives of therapy  - Start terbinafine cream BID to AA for at least 4-6 weeks. would continue to use up to 2 weeks after resolution  - Start fluconazole 200 mg once weekly X 3 weeks. Discussed potential for a drug interaction between Apixban and atorvastatin, though the risk is low considering the fluconazole dosing is weekly  - Messaged patient's cardiologist to discuss the potential for drug interactions above   RTC 3 weeks or prn

## 2024-09-13 NOTE — HISTORY OF PRESENT ILLNESS
[FreeTextEntry1] : new pt: rash [de-identified] : 58 y/o M w/ rash in the groin for years which is worsening this summer. Has upcoming cardiac surgery in October.   No personal or FH of skin cancer.

## 2024-09-13 NOTE — HISTORY OF PRESENT ILLNESS
[FreeTextEntry1] : new pt: rash [de-identified] : 60 y/o M w/ rash in the groin for years which is worsening this summer. Has upcoming cardiac surgery in October.   No personal or FH of skin cancer.

## 2024-09-18 ENCOUNTER — APPOINTMENT (OUTPATIENT)
Dept: DERMATOLOGY | Facility: CLINIC | Age: 59
End: 2024-09-18

## 2024-09-20 ENCOUNTER — APPOINTMENT (OUTPATIENT)
Dept: DERMATOLOGY | Facility: CLINIC | Age: 59
End: 2024-09-20

## 2024-09-25 ENCOUNTER — OUTPATIENT (OUTPATIENT)
Dept: OUTPATIENT SERVICES | Facility: HOSPITAL | Age: 59
LOS: 1 days | End: 2024-09-25
Payer: COMMERCIAL

## 2024-09-25 VITALS
OXYGEN SATURATION: 98 % | HEART RATE: 68 BPM | RESPIRATION RATE: 12 BRPM | HEIGHT: 73 IN | DIASTOLIC BLOOD PRESSURE: 87 MMHG | TEMPERATURE: 97 F | SYSTOLIC BLOOD PRESSURE: 138 MMHG

## 2024-09-25 DIAGNOSIS — Z98.890 OTHER SPECIFIED POSTPROCEDURAL STATES: Chronic | ICD-10-CM

## 2024-09-25 DIAGNOSIS — Z01.818 ENCOUNTER FOR OTHER PREPROCEDURAL EXAMINATION: ICD-10-CM

## 2024-09-25 DIAGNOSIS — I48.0 PAROXYSMAL ATRIAL FIBRILLATION: ICD-10-CM

## 2024-09-25 DIAGNOSIS — Z95.1 PRESENCE OF AORTOCORONARY BYPASS GRAFT: Chronic | ICD-10-CM

## 2024-09-25 DIAGNOSIS — Z96.641 PRESENCE OF RIGHT ARTIFICIAL HIP JOINT: Chronic | ICD-10-CM

## 2024-09-25 LAB
ANION GAP SERPL CALC-SCNC: 12 MMOL/L — SIGNIFICANT CHANGE UP (ref 5–17)
APTT BLD: 41.3 SEC — HIGH (ref 24.5–35.6)
BASOPHILS # BLD AUTO: 0.03 K/UL — SIGNIFICANT CHANGE UP (ref 0–0.2)
BASOPHILS NFR BLD AUTO: 0.4 % — SIGNIFICANT CHANGE UP (ref 0–2)
BLD GP AB SCN SERPL QL: SIGNIFICANT CHANGE UP
BUN SERPL-MCNC: 20.5 MG/DL — HIGH (ref 8–20)
CALCIUM SERPL-MCNC: 9.7 MG/DL — SIGNIFICANT CHANGE UP (ref 8.4–10.5)
CHLORIDE SERPL-SCNC: 104 MMOL/L — SIGNIFICANT CHANGE UP (ref 96–108)
CO2 SERPL-SCNC: 26 MMOL/L — SIGNIFICANT CHANGE UP (ref 22–29)
CREAT SERPL-MCNC: 1.03 MG/DL — SIGNIFICANT CHANGE UP (ref 0.5–1.3)
EGFR: 85 ML/MIN/1.73M2 — SIGNIFICANT CHANGE UP
EOSINOPHIL # BLD AUTO: 0.26 K/UL — SIGNIFICANT CHANGE UP (ref 0–0.5)
EOSINOPHIL NFR BLD AUTO: 3.8 % — SIGNIFICANT CHANGE UP (ref 0–6)
GLUCOSE SERPL-MCNC: 91 MG/DL — SIGNIFICANT CHANGE UP (ref 70–99)
HCT VFR BLD CALC: 42.2 % — SIGNIFICANT CHANGE UP (ref 39–50)
HGB BLD-MCNC: 14.7 G/DL — SIGNIFICANT CHANGE UP (ref 13–17)
IMM GRANULOCYTES NFR BLD AUTO: 0.3 % — SIGNIFICANT CHANGE UP (ref 0–0.9)
INR BLD: 1.33 RATIO — HIGH (ref 0.85–1.16)
LYMPHOCYTES # BLD AUTO: 2.18 K/UL — SIGNIFICANT CHANGE UP (ref 1–3.3)
LYMPHOCYTES # BLD AUTO: 31.8 % — SIGNIFICANT CHANGE UP (ref 13–44)
MAGNESIUM SERPL-MCNC: 2 MG/DL — SIGNIFICANT CHANGE UP (ref 1.8–2.6)
MCHC RBC-ENTMCNC: 31.3 PG — SIGNIFICANT CHANGE UP (ref 27–34)
MCHC RBC-ENTMCNC: 34.8 GM/DL — SIGNIFICANT CHANGE UP (ref 32–36)
MCV RBC AUTO: 90 FL — SIGNIFICANT CHANGE UP (ref 80–100)
MONOCYTES # BLD AUTO: 0.56 K/UL — SIGNIFICANT CHANGE UP (ref 0–0.9)
MONOCYTES NFR BLD AUTO: 8.2 % — SIGNIFICANT CHANGE UP (ref 2–14)
NEUTROPHILS # BLD AUTO: 3.8 K/UL — SIGNIFICANT CHANGE UP (ref 1.8–7.4)
NEUTROPHILS NFR BLD AUTO: 55.5 % — SIGNIFICANT CHANGE UP (ref 43–77)
PLATELET # BLD AUTO: 207 K/UL — SIGNIFICANT CHANGE UP (ref 150–400)
POTASSIUM SERPL-MCNC: 4.4 MMOL/L — SIGNIFICANT CHANGE UP (ref 3.5–5.3)
POTASSIUM SERPL-SCNC: 4.4 MMOL/L — SIGNIFICANT CHANGE UP (ref 3.5–5.3)
PROTHROM AB SERPL-ACNC: 15.4 SEC — HIGH (ref 9.9–13.4)
RBC # BLD: 4.69 M/UL — SIGNIFICANT CHANGE UP (ref 4.2–5.8)
RBC # FLD: 12.7 % — SIGNIFICANT CHANGE UP (ref 10.3–14.5)
SODIUM SERPL-SCNC: 141 MMOL/L — SIGNIFICANT CHANGE UP (ref 135–145)
WBC # BLD: 6.85 K/UL — SIGNIFICANT CHANGE UP (ref 3.8–10.5)
WBC # FLD AUTO: 6.85 K/UL — SIGNIFICANT CHANGE UP (ref 3.8–10.5)

## 2024-09-25 PROCEDURE — 86901 BLOOD TYPING SEROLOGIC RH(D): CPT

## 2024-09-25 PROCEDURE — 36415 COLL VENOUS BLD VENIPUNCTURE: CPT

## 2024-09-25 PROCEDURE — 93005 ELECTROCARDIOGRAM TRACING: CPT

## 2024-09-25 PROCEDURE — 85610 PROTHROMBIN TIME: CPT

## 2024-09-25 PROCEDURE — 86850 RBC ANTIBODY SCREEN: CPT

## 2024-09-25 PROCEDURE — 85025 COMPLETE CBC W/AUTO DIFF WBC: CPT

## 2024-09-25 PROCEDURE — 93010 ELECTROCARDIOGRAM REPORT: CPT

## 2024-09-25 PROCEDURE — 80048 BASIC METABOLIC PNL TOTAL CA: CPT

## 2024-09-25 PROCEDURE — 86900 BLOOD TYPING SEROLOGIC ABO: CPT

## 2024-09-25 PROCEDURE — G0463: CPT

## 2024-09-25 PROCEDURE — 83735 ASSAY OF MAGNESIUM: CPT

## 2024-09-25 PROCEDURE — 85730 THROMBOPLASTIN TIME PARTIAL: CPT

## 2024-09-25 NOTE — H&P PST ADULT - HISTORY OF PRESENT ILLNESS
59-year-old male with a history of HTN, atrial fibrillation, and CAD. The patient underwent CABG (LIMA-LAD, vein-OM, Diag) and Encompass ablation/BHARTI clipping with Dr. Duke on 23. Post-op course was notable for atrial fibrillation with RVR which was treated with amoidarone leading to eventual conversion to sinus rhythm. The patient had a syncopal episode on 23 for which he went to the ER, which was diagnosed as vagal syncope. A EVELYN performed on 23 revealed a residual left atrial appendage pouch with flow through it. He was previously on eliquis which was switched to xarelto. In 2024, the patient was again noted to be in symptomatic atrial flutter/fibrillation. He was sent to the ER but went home before being seen because of a long wait time. He eventually converted to sinus rhythm spontaneously. He denies any recurrent episodes since. He is on metoprolol 100 mg daily in addition to xarelto for stroke prophylaxis.  He presents today for PST for planned/elective atrial fibrillation ablation with Dr. Alfaro.  Patient will be moving to [place] post procedure.    Cardiology Summary:  EC/10/24: sinus rhythm      Echo: 9/10/24: EF 55-60%, LA enlargement, mild TR    Cardiac Catheterization (23 @ 10:43):  Diagnostic Conclusions:   Right dominant coronary system There is significant 2 vessel CAD  involving the ostial to mid LAD and proximal LCX LAD found  to be significant and heavily calcified with IVUS investigation

## 2024-09-25 NOTE — H&P PST ADULT - NSANTHOSAYNRD_GEN_A_CORE
No. BRANDEN screening performed.  STOP BANG Legend: 0-2 = LOW Risk; 3-4 = INTERMEDIATE Risk; 5-8 = HIGH Risk

## 2024-09-25 NOTE — H&P PST ADULT - ASSESSMENT
In summary, the patient is a 59-year-old male with a history of atrial fibrillation and CAD s/p CABG, Encompass ablation for AF and BHARTI clipping on 6/9/23 with residual pouch on EVELYN. He has recurrent symptomatic atrial fibrillation and flutter. He presents today for PST to  proceed with planned/elective afib ablation with Dr. Alfaro 10/10/24.                                                                         Risk Stratification:  ASA and Mallampati as per Anesthesiologist  High risk for BRANDEN (BANG score 7)       Plan/Recommendations:   -patient seen and examined  -ECG and Labs reviewed  -NPO after midnight prior with exception of sip of water with morning medications  -Hold Xarelto morning of procedure; continue all other meds as usual  -Pre-procedure instructions provided (verbal & written)   -Consent to be obtained by attending electrophysiologist on the scheduled procedure date

## 2024-10-03 ENCOUNTER — APPOINTMENT (OUTPATIENT)
Dept: DERMATOLOGY | Facility: CLINIC | Age: 59
End: 2024-10-03

## 2024-10-10 ENCOUNTER — TRANSCRIPTION ENCOUNTER (OUTPATIENT)
Age: 59
End: 2024-10-10

## 2024-10-10 ENCOUNTER — INPATIENT (INPATIENT)
Facility: HOSPITAL | Age: 59
LOS: 0 days | Discharge: HOME | DRG: 310 | End: 2024-10-11
Attending: INTERNAL MEDICINE | Admitting: INTERNAL MEDICINE
Payer: COMMERCIAL

## 2024-10-10 VITALS
SYSTOLIC BLOOD PRESSURE: 144 MMHG | RESPIRATION RATE: 18 BRPM | TEMPERATURE: 98 F | HEART RATE: 54 BPM | DIASTOLIC BLOOD PRESSURE: 98 MMHG | OXYGEN SATURATION: 97 %

## 2024-10-10 DIAGNOSIS — I48.0 PAROXYSMAL ATRIAL FIBRILLATION: ICD-10-CM

## 2024-10-10 DIAGNOSIS — Z98.890 OTHER SPECIFIED POSTPROCEDURAL STATES: Chronic | ICD-10-CM

## 2024-10-10 DIAGNOSIS — Z95.1 PRESENCE OF AORTOCORONARY BYPASS GRAFT: Chronic | ICD-10-CM

## 2024-10-10 DIAGNOSIS — Z96.641 PRESENCE OF RIGHT ARTIFICIAL HIP JOINT: Chronic | ICD-10-CM

## 2024-10-10 PROCEDURE — 93010 ELECTROCARDIOGRAM REPORT: CPT

## 2024-10-10 RX ORDER — PANTOPRAZOLE SODIUM 40 MG/1
40 TABLET, DELAYED RELEASE ORAL
Refills: 0 | Status: DISCONTINUED | OUTPATIENT
Start: 2024-10-10 | End: 2024-10-11

## 2024-10-10 RX ORDER — OXYCODONE AND ACETAMINOPHEN 5; 325 MG/1; MG/1
1 TABLET ORAL EVERY 6 HOURS
Refills: 0 | Status: DISCONTINUED | OUTPATIENT
Start: 2024-10-10 | End: 2024-10-11

## 2024-10-10 RX ORDER — ATORVASTATIN CALCIUM 10 MG/1
80 TABLET, FILM COATED ORAL AT BEDTIME
Refills: 0 | Status: DISCONTINUED | OUTPATIENT
Start: 2024-10-10 | End: 2024-10-11

## 2024-10-10 RX ORDER — ASPIRIN 325 MG
81 TABLET ORAL DAILY
Refills: 0 | Status: DISCONTINUED | OUTPATIENT
Start: 2024-10-10 | End: 2024-10-11

## 2024-10-10 RX ORDER — FUROSEMIDE 10 MG/ML
20 INJECTION INTRAVENOUS ONCE
Refills: 0 | Status: COMPLETED | OUTPATIENT
Start: 2024-10-10 | End: 2024-10-10

## 2024-10-10 RX ORDER — FUROSEMIDE 10 MG/ML
20 INJECTION INTRAVENOUS DAILY
Refills: 0 | Status: DISCONTINUED | OUTPATIENT
Start: 2024-10-11 | End: 2024-10-11

## 2024-10-10 RX ORDER — ACETAMINOPHEN 325 MG
650 TABLET ORAL EVERY 6 HOURS
Refills: 0 | Status: DISCONTINUED | OUTPATIENT
Start: 2024-10-10 | End: 2024-10-11

## 2024-10-10 RX ORDER — MAG HYDROX/ALUMINUM HYD/SIMETH 200-200-20
30 SUSPENSION, ORAL (FINAL DOSE FORM) ORAL EVERY 4 HOURS
Refills: 0 | Status: DISCONTINUED | OUTPATIENT
Start: 2024-10-10 | End: 2024-10-11

## 2024-10-10 RX ORDER — ALPRAZOLAM 0.5 MG/1
0.25 TABLET ORAL EVERY 6 HOURS
Refills: 0 | Status: DISCONTINUED | OUTPATIENT
Start: 2024-10-10 | End: 2024-10-11

## 2024-10-10 RX ORDER — METOPROLOL TARTRATE 50 MG
100 TABLET ORAL DAILY
Refills: 0 | Status: DISCONTINUED | OUTPATIENT
Start: 2024-10-10 | End: 2024-10-11

## 2024-10-10 RX ORDER — RIVAROXABAN 10 MG/1
20 TABLET, FILM COATED ORAL
Refills: 0 | Status: DISCONTINUED | OUTPATIENT
Start: 2024-10-10 | End: 2024-10-11

## 2024-10-10 RX ORDER — HYDRALAZINE HYDROCHLORIDE 100 MG/1
10 TABLET ORAL ONCE
Refills: 0 | Status: COMPLETED | OUTPATIENT
Start: 2024-10-10 | End: 2024-10-10

## 2024-10-10 RX ORDER — BENZOCAINE AND LEVOMENTHOL 200; 5 MG/G; MG/G
1 SPRAY TOPICAL
Refills: 0 | Status: DISCONTINUED | OUTPATIENT
Start: 2024-10-10 | End: 2024-10-11

## 2024-10-10 RX ADMIN — RIVAROXABAN 20 MILLIGRAM(S): 10 TABLET, FILM COATED ORAL at 17:04

## 2024-10-10 RX ADMIN — Medication 0.6 MILLIGRAM(S): at 18:10

## 2024-10-10 RX ADMIN — FUROSEMIDE 20 MILLIGRAM(S): 10 INJECTION INTRAVENOUS at 17:04

## 2024-10-10 RX ADMIN — HYDRALAZINE HYDROCHLORIDE 10 MILLIGRAM(S): 100 TABLET ORAL at 18:10

## 2024-10-10 RX ADMIN — OXYCODONE AND ACETAMINOPHEN 1 TABLET(S): 5; 325 TABLET ORAL at 15:50

## 2024-10-10 RX ADMIN — ATORVASTATIN CALCIUM 80 MILLIGRAM(S): 10 TABLET, FILM COATED ORAL at 22:12

## 2024-10-10 RX ADMIN — Medication 1 GRAM(S): at 17:04

## 2024-10-10 RX ADMIN — PANTOPRAZOLE SODIUM 40 MILLIGRAM(S): 40 TABLET, DELAYED RELEASE ORAL at 17:04

## 2024-10-10 NOTE — DISCHARGE NOTE PROVIDER - NSDCMRMEDTOKEN_GEN_ALL_CORE_FT
aspirin 81 mg oral tablet: 1 orally once a day  atorvastatin 80 mg oral tablet: 1 tab(s) orally once a day (at bedtime)  metoprolol succinate 100 mg oral tablet, extended release: 1 tab(s) orally once a day  rivaroxaban 20 mg oral tablet: 1 tab(s) orally once a day (before a meal)   aspirin 81 mg oral tablet: 1 orally once a day  atorvastatin 80 mg oral tablet: 1 tab(s) orally once a day (at bedtime)  colchicine 0.6 mg oral tablet: 0.5 tab(s) orally once a day  furosemide 20 mg oral tablet: 1 tab(s) orally once a day  metoprolol succinate 100 mg oral tablet, extended release: 1 tab(s) orally once a day  pantoprazole 40 mg oral delayed release tablet: 1 tab(s) orally 2 times a day (Take 1 tablet every 12 hours for 14 days, then one tablet once a day for 6 weeks)  rivaroxaban 20 mg oral tablet: 1 tab(s) orally once a day (before a meal)  sucralfate 1 g/10 mL oral suspension: 10 milliliter(s) orally 2 times a day

## 2024-10-10 NOTE — DISCHARGE NOTE PROVIDER - HOSPITAL COURSE
59 year old male, daily marijuana use, with HTN, CAD s/p CABG (LIMA-LAD, vein-OM, Diag) and Encompass ablation/BHARTI clipping (Dr. Duke, 6/9/23) post-op atrial fibrillation w/ RVR. Notably, EVELYN on 8/18/23 revealed a residual left atrial appendage pouch w/ flow through it. He has had symptomatic paroxysmal atrial fibrillation and flutter. He presented electively and is now status post uncomplicated radiofrequency ablation of atrial fibrillation (PVI, PW, CTI, anterior mitral line). The patient was observed overnight without event and was discharged home the following morning with a plan for outpatient follow up.

## 2024-10-10 NOTE — DISCHARGE NOTE PROVIDER - NSDCFUADDINST_GEN_ALL_CORE_FT
Follow up with Dr. Alfaro in 3-4 weeks. Our office will contact you in 3-5 days to schedule this appointment. Please call [phone] with questions or concerns.

## 2024-10-10 NOTE — DISCHARGE NOTE PROVIDER - NSDCDCMDCOMP_GEN_ALL_CORE
Spoke with patient via phone regarding anticoagulation monitoring.   Last INR on 9/9/24 was 2.8.  Dose maintained.   Today's INR is 2.4 and is within goal range.    Current warfarin total weekly dose of 35 mg verified.  Informed the INR result is within therapeutic range and instructed to maintain current dose per protocol. Discussed dose and return date of 1 week for next INR. See Anticoagulation flowsheet.    Patient denies any changes in medication or diet.  Patient denies any unusual bleeding or bruising.      Ar Britton DO is referring provider.    Dr. Garcia is in the office today supervising the treatment.    Instructed to contact the clinic with any unusual bleeding or bruising, any changes in medications, diet, health status, lifestyle, or any other changes, questions or concerns. Verbalized understanding of all discussed.      This document is complete and the patient is ready for discharge.

## 2024-10-10 NOTE — DISCHARGE NOTE PROVIDER - CARE PROVIDER_API CALL
Martin Alfaro [facility]  Cardiac Electrophysiology  [address], [address]  [place] [address]  Phone: [phone]  Fax: [phone]  Follow Up Time:

## 2024-10-11 ENCOUNTER — TRANSCRIPTION ENCOUNTER (OUTPATIENT)
Age: 59
End: 2024-10-11

## 2024-10-11 VITALS
TEMPERATURE: 98 F | RESPIRATION RATE: 18 BRPM | HEART RATE: 66 BPM | SYSTOLIC BLOOD PRESSURE: 161 MMHG | DIASTOLIC BLOOD PRESSURE: 101 MMHG | OXYGEN SATURATION: 93 %

## 2024-10-11 LAB
ANION GAP SERPL CALC-SCNC: 11 MMOL/L — SIGNIFICANT CHANGE UP (ref 5–17)
BUN SERPL-MCNC: 15.6 MG/DL — SIGNIFICANT CHANGE UP (ref 8–20)
CALCIUM SERPL-MCNC: 8.7 MG/DL — SIGNIFICANT CHANGE UP (ref 8.4–10.5)
CHLORIDE SERPL-SCNC: 105 MMOL/L — SIGNIFICANT CHANGE UP (ref 96–108)
CO2 SERPL-SCNC: 24 MMOL/L — SIGNIFICANT CHANGE UP (ref 22–29)
CREAT SERPL-MCNC: 0.85 MG/DL — SIGNIFICANT CHANGE UP (ref 0.5–1.3)
EGFR: 101 ML/MIN/1.73M2 — SIGNIFICANT CHANGE UP
GLUCOSE SERPL-MCNC: 96 MG/DL — SIGNIFICANT CHANGE UP (ref 70–99)
HCT VFR BLD CALC: 42.2 % — SIGNIFICANT CHANGE UP (ref 39–50)
HGB BLD-MCNC: 14.6 G/DL — SIGNIFICANT CHANGE UP (ref 13–17)
MAGNESIUM SERPL-MCNC: 1.9 MG/DL — SIGNIFICANT CHANGE UP (ref 1.6–2.6)
MCHC RBC-ENTMCNC: 31.6 PG — SIGNIFICANT CHANGE UP (ref 27–34)
MCHC RBC-ENTMCNC: 34.6 GM/DL — SIGNIFICANT CHANGE UP (ref 32–36)
MCV RBC AUTO: 91.3 FL — SIGNIFICANT CHANGE UP (ref 80–100)
PLATELET # BLD AUTO: 182 K/UL — SIGNIFICANT CHANGE UP (ref 150–400)
POTASSIUM SERPL-MCNC: 4 MMOL/L — SIGNIFICANT CHANGE UP (ref 3.5–5.3)
POTASSIUM SERPL-SCNC: 4 MMOL/L — SIGNIFICANT CHANGE UP (ref 3.5–5.3)
RBC # BLD: 4.62 M/UL — SIGNIFICANT CHANGE UP (ref 4.2–5.8)
RBC # FLD: 12.9 % — SIGNIFICANT CHANGE UP (ref 10.3–14.5)
SODIUM SERPL-SCNC: 140 MMOL/L — SIGNIFICANT CHANGE UP (ref 135–145)
WBC # BLD: 11.16 K/UL — HIGH (ref 3.8–10.5)
WBC # FLD AUTO: 11.16 K/UL — HIGH (ref 3.8–10.5)

## 2024-10-11 PROCEDURE — 83735 ASSAY OF MAGNESIUM: CPT

## 2024-10-11 PROCEDURE — 93005 ELECTROCARDIOGRAM TRACING: CPT

## 2024-10-11 PROCEDURE — 36415 COLL VENOUS BLD VENIPUNCTURE: CPT

## 2024-10-11 PROCEDURE — C1730: CPT

## 2024-10-11 PROCEDURE — C1894: CPT

## 2024-10-11 PROCEDURE — 93657 TX L/R ATRIAL FIB ADDL: CPT

## 2024-10-11 PROCEDURE — 80048 BASIC METABOLIC PNL TOTAL CA: CPT

## 2024-10-11 PROCEDURE — 93010 ELECTROCARDIOGRAM REPORT: CPT

## 2024-10-11 PROCEDURE — 85027 COMPLETE CBC AUTOMATED: CPT

## 2024-10-11 PROCEDURE — C1766: CPT

## 2024-10-11 PROCEDURE — 93656 COMPRE EP EVAL ABLTJ ATR FIB: CPT

## 2024-10-11 PROCEDURE — C1759: CPT

## 2024-10-11 PROCEDURE — 99232 SBSQ HOSP IP/OBS MODERATE 35: CPT

## 2024-10-11 PROCEDURE — C1887: CPT

## 2024-10-11 PROCEDURE — C1732: CPT

## 2024-10-11 RX ORDER — PANTOPRAZOLE SODIUM 40 MG/1
1 TABLET, DELAYED RELEASE ORAL
Qty: 70 | Refills: 0
Start: 2024-10-11 | End: 2024-10-24

## 2024-10-11 RX ORDER — FUROSEMIDE 10 MG/ML
1 INJECTION INTRAVENOUS
Qty: 2 | Refills: 0
Start: 2024-10-11 | End: 2024-10-12

## 2024-10-11 RX ADMIN — Medication 100 MILLIGRAM(S): at 05:16

## 2024-10-11 RX ADMIN — Medication 0.3 MILLIGRAM(S): at 10:10

## 2024-10-11 RX ADMIN — PANTOPRAZOLE SODIUM 40 MILLIGRAM(S): 40 TABLET, DELAYED RELEASE ORAL at 05:16

## 2024-10-11 RX ADMIN — Medication 81 MILLIGRAM(S): at 10:10

## 2024-10-11 RX ADMIN — Medication 1 GRAM(S): at 05:17

## 2024-10-11 NOTE — PROGRESS NOTE ADULT - SUBJECTIVE AND OBJECTIVE BOX
Pt doing well POD #1 s/p radiofrequency atrial fibrillation ablation (PVI, PW, CTI, anterior mitral line). b/l groin sutures removed at bedside this AM. Denies complaint.     EKG: Pending  TELE: Sinus rhythm with intact conduction.    MEDICATIONS  (STANDING):  aspirin enteric coated 81 milliGRAM(s) Oral daily  atorvastatin 80 milliGRAM(s) Oral at bedtime  colchicine 0.3 milliGRAM(s) Oral daily  furosemide    Tablet 20 milliGRAM(s) Oral daily  metoprolol succinate  milliGRAM(s) Oral daily  pantoprazole    Tablet 40 milliGRAM(s) Oral two times a day  rivaroxaban 20 milliGRAM(s) Oral with dinner  sucralfate suspension 1 Gram(s) Oral two times a day    MEDICATIONS  (PRN):  acetaminophen     Tablet .. 650 milliGRAM(s) Oral every 6 hours PRN Mild Pain (1 - 3), Moderate Pain (4 - 6)  ALPRAZolam 0.25 milliGRAM(s) Oral every 6 hours PRN anxiety/insomnia  aluminum hydroxide/magnesium hydroxide/simethicone Suspension 30 milliLiter(s) Oral every 4 hours PRN Dyspepsia  benzocaine/menthol Lozenge 1 Lozenge Oral every 2 hours PRN Sore Throat  oxycodone    5 mG/acetaminophen 325 mG 1 Tablet(s) Oral every 6 hours PRN Severe Pain (7 - 10)      Allergies    No Known Allergies    Intolerances      PAST MEDICAL & SURGICAL HISTORY:  HTN (hypertension)      Afib      Osteoarthritis      History of right hip replacement      Obesity, morbid      CAD (coronary artery disease)      S/P hip replacement, right      S/P CABG x 3      S/P left atrial appendage ligation          Vital Signs Last 24 Hrs  T(C): 36.4 (11 Oct 2024 00:36), Max: 36.4 (10 Oct 2024 17:50)  T(F): 97.5 (11 Oct 2024 00:36), Max: 97.6 (10 Oct 2024 21:09)  HR: 70 (11 Oct 2024 00:36) (62 - 70)  BP: 161/103 (11 Oct 2024 06:31) (127/81 - 173/114)  BP(mean): 96 (10 Oct 2024 21:09) (96 - 96)  RR: 18 (11 Oct 2024 00:36) (13 - 20)  SpO2: 97% (11 Oct 2024 00:36) (93% - 99%)    Parameters below as of 11 Oct 2024 00:36  Patient On (Oxygen Delivery Method): room air        Physical Exam:  Constitutional: NAD, AAOx3  Cardiovascular: +S1S2 RRR  Pulmonary: CTA b/l, unlabored  Abd: soft NTND +BS  Groins: C/D/I bilaterally; no bleeding, hematoma, edema  Extremities: no pedal edema, +distal pulses b/l  Neuro: non focal, JARA x4    LABS:                        14.6   11.16 )-----------( 182      ( 11 Oct 2024 04:28 )             42.2     10-11    140  |  105  |  15.6  ----------------------------<  96  4.0   |  24.0  |  0.85    Ca    8.7      11 Oct 2024 04:28  Mg     1.9     10-11        Urinalysis Basic - ( 11 Oct 2024 04:28 )    Color: x / Appearance: x / SG: x / pH: x  Gluc: 96 mg/dL / Ketone: x  / Bili: x / Urobili: x   Blood: x / Protein: x / Nitrite: x   Leuk Esterase: x / RBC: x / WBC x   Sq Epi: x / Non Sq Epi: x / Bacteria: x        Assessment:   59 year old male, daily marijuana use, with HTN, CAD s/p CABG (LIMA-LAD, vein-OM, Diag) and [facility]compass ablation/BHARTI clipping (Dr. Duke, 6/9/23) post-op atrial fibrillation w/ RVR. Notably, EVELYN on 8/18/23 revealed a residual left atrial appendage pouch w/ flow through it. He has had symptomatic paroxysmal atrial fibrillation and flutter. He presented electively and is now POD # 1 status post uncomplicated radiofrequency ablation of atrial fibrillation (PVI, PW, CTI, anterior mitral line). b/l groin sutures removed at bedside this AM. Denies complaint.      Plan:   c/w Xarelto 20mg QD with Dinner  Start Protonix 40mg twice daily x 2 weeks, then once daily x 6 weeks.   Start Carafate 1gm BID x 2 weeks.   Colchicine 0.3mg daily for 5 days.   Lasix 20mg PO QD x 2 days  Pt instructed as activity limitations - no lifting/pushing/pulling >10 lbs or strenuous exercise x 1 week.   Pt instructed as to access site care and f/up - written instructions included in d/c documents.  Outpt f/up in 2-4 weeks - office will contact pt to schedule.  
PROCEDURE(S): Radiofrequency Ablation of Atrial Fibrillation    ELECTROPHYSIOLOGIST(S): Martin Alfaro MD         COMPLICATIONS:  none        DISPOSITION: observation      CONDITION: stable    Pt doing well s/p radiofrequency atrial fibrillation ablation (PVI, PW, CTI, anterior mitral line) via b/l [facility] access. Denies complaint post procedure.     I/Os: net +3000 cc     MEDICATIONS  (STANDING):  aspirin enteric coated 81 milliGRAM(s) Oral daily  atorvastatin 80 milliGRAM(s) Oral at bedtime  colchicine 0.6 milliGRAM(s) Oral once  furosemide   Injectable 20 milliGRAM(s) IV Push once  metoprolol succinate  milliGRAM(s) Oral daily  pantoprazole    Tablet 40 milliGRAM(s) Oral two times a day  rivaroxaban 20 milliGRAM(s) Oral with dinner  sucralfate suspension 1 Gram(s) Oral two times a day    MEDICATIONS  (PRN):  acetaminophen     Tablet .. 650 milliGRAM(s) Oral every 6 hours PRN Mild Pain (1 - 3), Moderate Pain (4 - 6)  ALPRAZolam 0.25 milliGRAM(s) Oral every 6 hours PRN anxiety/insomnia  aluminum hydroxide/magnesium hydroxide/simethicone Suspension 30 milliLiter(s) Oral every 4 hours PRN Dyspepsia  benzocaine/menthol Lozenge 1 Lozenge Oral every 2 hours PRN Sore Throat  oxycodone    5 mG/acetaminophen 325 mG 1 Tablet(s) Oral every 6 hours PRN Severe Pain (7 - 10)    Allergies:  No Known Allergies    T(C): 36.6 (10-10-24 @ 07:25), Max: 36.6 (10-10-24 @ 07:25)  HR: 54 (10-10-24 @ 07:25) (54 - 54)  BP: 144/98 (10-10-24 @ 07:25) (144/98 - 144/98)  RR: 18 (10-10-24 @ 07:25) (18 - 18)  SpO2: 97% (10-10-24 @ 07:25) (97% - 97%)  Post-procedure VS: /102 HR 65 O2 sat 97% RR 18     Physical exam:   awake, alert, no obvious distress   Card: S1/S2, RRR, no m/g/r  Resp: lungs CTA b/l  Abd: S/NT/ND  Groins: hemostatic sutures in place; sites C/D/I; no bleeding, hematoma, erythema, exudate or edema  Ext: no edema; distal pulses intact  Skin: warm, dry, no rash     ECG: NSR 63 bpm     Assessment:   59 year old male, daily marijuana use, with HTN, CAD s/p CABG (LIMA-LAD, vein-OM, Diag) and Encompass ablation/BHARTI clipping (Dr. Duke, 6/9/23) post-op atrial fibrillation w/ RVR. Notably, EVELYN on 8/18/23 revealed a residual left atrial appendage pouch w/ flow through it. He has had symptomatic paroxysmal atrial fibrillation and flutter. He presented electively and is now status post uncomplicated radiofrequency ablation of atrial fibrillation (PVI, PW, CTI, anterior mitral line). Resting comfortably.     Plan:   Bedrest x 4 hours, then OOB with assistance and progress as tolerated.   Groin sutures to be removed by EP service in AM.   Pending groin status, resume Xarelto 20mg daily w/ dinner @ 17:00   DO NOT HOLD, INTERRUPT OR REVERSE ANTICOAGULATION WITHOUT EXPLICIT APPROVAL FROM EP SERVICE.   Lasix 20mg IV x 1 dose once ambulating, then Lasix 20mg PO daily x 3 days.   Start Protonix 40mg twice daily x 2 weeks, then once daily x 6 weeks.   Start Carafate 1gm BID x 2 weeks.   Colchicine 0.6mg PO x 1 today, then 0.3mg daily for 5 days.   Continue other home medications.   Strict I/Os.  Please encourage incentive spirometry and ambulation once able.  Observation and monitoring on telemetry overnight with anticipated discharge in the AM and outpt follow up in 2-4 weeks.  
59 year old male, daily marijuana use, with HTN, CAD s/p CABG (LIMA-LAD, vein-OM, Diag) and Encompass ablation/BHARTI clipping (Dr. Duke, 6/9/23) post-op atrial fibrillation w/ RVR. Notably, EVELYN on 8/18/23 revealed a residual left atrial appendage pouch w/ flow through it. He has had symptomatic paroxysmal atrial fibrillation and flutter. He presents today for elective radiofrequency ablation of atrial fibrillation.     PST and labs from 9/25/24 reviewed; denies interval change.   Confirms NPO > 8 hrs. Last dose Xarelto 10/9/24 PM.     - iv heplock  - confirmatory type and screen  - 2 units PRBC on hold  - consent w/ MD     
Admission Criteria  Please admit the patient to the following service: CARDIOLOGY    Major Criteria:  - Continuous EKG monitoring is required for condition causing arrhythmia (hyperkalemia, etc)  - Significant volume load > 200 ml    Admit to: Cardiology/Telemetry     Patient is being admitted to the inpatient service due to high risk characteristics and need for further management/monitoring and is considered to be at a significantly increased risk of major adverse cardiac and vascular events if discharged.

## 2024-10-11 NOTE — DISCHARGE NOTE NURSING/CASE MANAGEMENT/SOCIAL WORK - NSDCPEXARELTODIET_GEN_ALL_CORE
Eat healthy foods you enjoy. Rivaroxaban/Xarelto DOES NOT have a special diet. Limit your alcohol intake.
Patient

## 2024-10-11 NOTE — DISCHARGE NOTE NURSING/CASE MANAGEMENT/SOCIAL WORK - PATIENT PORTAL LINK FT
You can access the FollowMyHealth Patient Portal offered by [facility] by registering at the following website: [email or web address]. By joining [employer]’s FollowMyHealth portal, you will also be able to view your health information using other applications (apps) compatible with our system.

## 2024-10-11 NOTE — DISCHARGE NOTE NURSING/CASE MANAGEMENT/SOCIAL WORK - NSDCPEFALRISK_GEN_ALL_CORE
For information on Fall & Injury Prevention, visit: [email or web address] OR  [email or web address] OR  https://www.cdc.gov/steadi/patient.html

## 2024-10-14 ENCOUNTER — NON-APPOINTMENT (OUTPATIENT)
Age: 59
End: 2024-10-14

## 2024-10-15 ENCOUNTER — NON-APPOINTMENT (OUTPATIENT)
Age: 59
End: 2024-10-15

## 2024-10-22 ENCOUNTER — APPOINTMENT (OUTPATIENT)
Dept: INTERNAL MEDICINE | Facility: CLINIC | Age: 59
End: 2024-10-22

## 2024-12-11 ENCOUNTER — APPOINTMENT (OUTPATIENT)
Dept: INTERNAL MEDICINE | Facility: CLINIC | Age: 59
End: 2024-12-11

## 2024-12-18 ENCOUNTER — APPOINTMENT (OUTPATIENT)
Dept: INTERNAL MEDICINE | Facility: CLINIC | Age: 59
End: 2024-12-18

## 2025-03-18 NOTE — ED PROVIDER NOTE - PROVIDER TOKENS
Tele-Infectious Diseases Follow-Up       Patient Name: Kimani Redd  Patient : 1965  Age/Sex: 59 y.o. male  Room/Bed: 536/536 A  Admission Date/Time: 3/15/2025 12:35 AM    Date: 3/18/2025  Time: 8:30 AM    Assessment:     Kimani Redd is a 59 y.o. male with:  MSSA bacteremia:  Source unclear  Left shoulder abscess s/p I and D at bedside 3/15 and 3/17, no cultures obtained  Leukocytosis, 2/2 above, Improving    I explained the logistics of home health/IV antibiotic to patient and his wife at bedside.  I explained to him that we are still waiting for repeat blood cultures to document sterilization before placing any long-term line. He continues to have pain in his left shoulder although this seems to be slowly improving.  There is still some pus coming out from his wound.  Discussed with orthopedic team, hold off on obtaining MRI for now.    Plan:     Pending repeated blood culture from yesterday  Continue IV cefazolin 2 g every 8 hours  Recommend proceeding with SHRUTHI  Anticipating 4 weeks of IV antibiotic from first negative blood culture, 6 weeks if he has evidence of endovascular infection.  No PICC line until Bcx is negative x 48 hours    All questions and concerns addressed with patient and understands and agrees with plan.      Thank you very much for allowing me to participate in the care of this patient.      ID will continue to follow. Please page with questions.    Diana Fraire MD   Attending, Infectious Disease     Reason for follow-up:      MSSA bacteremia    Summary:     Kimani Redd is a 59 y.o. male with a history significant for hypertension, tobacco use who was admitted on 3/15/2025 with left shoulder pain. Patient initially went to [facility] with left shoulder pain. There was a concern for possible septic arthritis. Patient reports no injury or trauma.  He has been having pain in his left shoulder for the last week or so. Patient received a dose of vancomycin/Zosyn at the  outside hospital and then transferred here for further evaluation.  His laboratory workup was remarkable for white cell count of 25,000.  He underwent CT of his left shoulder which was unremarkable. He was seen by Orthopedic surgery team. He underwent I&D at bedside. He had 2 sets of blood culture obtained which are growing methicillin sensitive Staphylococcus aureus. He underwent transthoracic echo which showed no clear vegetations.  He noted to have purulent drainage from his left shoulder on  and underwent I&D at bedside.  Repeated blood culture from 3/17 is pending.    Antimicrobial history:      Zosyn 3/14-3/15  Vancomycin 3/14-3/17   IV cefazolin 3/17-    24 hours events:      No acute events overnight   Remains hemodynamically stable  WBC downtrending    Subjective     Patient seen and examined, chart reviewed.  Patient said that he continues to have shoulder pain.  His dressing was changed yesterday and is very soaked with purulent stuff.    Review of Symptoms:     Patient reports no nausea, vomiting, diarrhea, cough, dyspnea, chest pain or palpitations    Objective     Vital signs  Vitals:    25 0319 25 0321 25 0734 25 0751   BP:  118/77 94/69 94/69   Pulse:  84 91 91   Resp: 18   16   Temp:  98.3 °F (36.8 °C) 98.3 °F (36.8 °C)    TempSrc:  Oral Oral    SpO2:  (!) 94% 96%    Weight:       Height:          Temp (24hrs), Av.4 °F (36.9 °C), Min:98.3 °F (36.8 °C), Max:98.8 °F (37.1 °C)      Physical exam:  GEN: Awake, resting comfortably  EYES: EOMI, no scleral icterus  HENT: MMM. No oral lesions. Fair dentition.  NECK: Supple, no cervical lymphadenopathy or meningismus.   CARDIO: RRR, no murmur.  PULM/CHEST: CTAB. No increased work of breathing  ABD: Normal bowel sounds. Soft, not tender or distended. No HSM appreciated.  MSK: no obvious effusion, swelling, increased warmth, or erythema of major joints. No pedal edema.  SKIN: No rashes. No stigmata of endocarditis.  NEURO:  AOx3. Speech fluent. Face symmetric.  PSYCH: Mood appropriate      Intake/Output Summary (Last 24 hours) at 3/18/2025 0830  Last data filed at 3/17/2025 2228  Gross per 24 hour   Intake 760 ml   Output --   Net 760 ml        Diagnostic Test     CBC, INR  Recent Labs   Lab 03/14/25  1955 03/15/25  0441 03/16/25  0533 03/17/25  0756   WBC 25.52* 26.82  26.82* 25.01* 21.70*   HGB 14.2 14.3 14.8 14.8   * 516* 518* 608*       BMP  Recent Labs   Lab 03/14/25  1955 03/15/25  0441 03/16/25  0533 03/17/25  0948    138 136  --    K 4.1 4.0 4.4  --     102 102  --    CO2 24 23 22  --    BUN 18.2 15.6 13.9  --    CREATININE 0.78 0.68* 0.63* 0.68*   CALCIUM 9.3 9.0 8.8  --    MG  --  1.80  --   --        No results found for this or any previous visit.    Recent Labs   Lab 03/15/25  0441 03/16/25  0533 03/17/25  0756   WBC 26.82  26.82* 25.01* 21.70*   HGB 14.3 14.8 14.8   HCT 42.8 44.6 44.9   * 518* 608*     Estimated Creatinine Clearance: 113.2 mL/min (A) (based on SCr of 0.68 mg/dL (L)).    Lab Results   Component Value Date    CREATININE 0.68 (L) 03/17/2025    CREATININE 0.63 (L) 03/16/2025    CREATININE 0.68 (L) 03/15/2025    ALKPHOS 123 03/15/2025    ALKPHOS 123 03/14/2025    ALKPHOS 125 09/07/2024        Microbiology and ID tests:     Microbiology Results (last 7 days)       Procedure Component Value Units Date/Time    Blood Culture [[id number]] Collected: 03/17/25 1602    Order Status: Resulted Specimen: Blood Updated: 03/17/25 1907    Blood Culture [[id number]] Collected: 03/17/25 1602    Order Status: Resulted Specimen: Blood Updated: 03/17/25 1907    Wound Culture [[id number]]  (Abnormal)  (Susceptibility) Collected: 03/15/25 0144    Order Status: Completed Specimen: Abscess from Shoulder, Left Updated: 03/17/25 0655     Wound Culture Many Methicillin Sensitive Staphylococcus aureus              Medications   Inpatient  Scheduled Meds:   aspirin  81 mg Oral Daily    ceFAZolin (Ancef) IV  (PEDS and ADULTS)  2 g Intravenous Q8H    enoxparin  40 mg Subcutaneous Q24H (prophylaxis, 1700)    fenofibrate  145 mg Oral Daily    metoprolol tartrate  25 mg Oral BID    nicotine  1 patch Transdermal Daily     Continuous Infusions:  PRN Meds:.  Current Facility-Administered Medications:     acetaminophen, 1,000 mg, Oral, Q6H PRN    aluminum-magnesium hydroxide-simethicone, 30 mL, Oral, QID PRN    bisacodyL, 10 mg, Rectal, Daily PRN    dextrose 50%, 12.5 g, Intravenous, PRN    dextrose 50%, 25 g, Intravenous, PRN    glucagon (human recombinant), 1 mg, Intramuscular, PRN    glucose, 16 g, Oral, PRN    glucose, 24 g, Oral, PRN    hydrOXYzine pamoate, 50 mg, Oral, Q6H PRN    melatonin, 6 mg, Oral, Nightly PRN    naloxone, 0.02 mg, Intravenous, PRN    ondansetron, 4 mg, Intravenous, Q4H PRN    oxyCODONE, 5 mg, Oral, Q4H PRN    prochlorperazine, 5 mg, Intravenous, Q6H PRN    senna-docusate 8.6-50 mg, 1 tablet, Oral, BID PRN    sodium chloride 0.9%, 10 mL, Intravenous, PRN    Home Meds  Current Outpatient Medications   Medication Instructions    aspirin (ECOTRIN) 81 mg, Daily    diclofenac (VOLTAREN) 50 mg, Oral, 3 times daily PRN    fenofibrate 160 mg, Oral, Daily    gabapentin (NEURONTIN) 300 mg, Oral, 3 times daily    HYDROcodone-acetaminophen (NORCO) 7.5-325 mg per tablet 1 tablet, Oral, Every 6 hours PRN    LIDOcaine (LIDODERM) 5 % 1 patch, Transdermal, Daily, Remove & Discard patch within 12 hours or as directed by MD    metoprolol tartrate (LOPRESSOR) 12.5 mg, Oral, Daily    predniSONE (DELTASONE) 20 mg, Oral, Daily    simvastatin (ZOCOR) 40 mg, Oral, Nightly       Imaging (personally reviewed):     No orders to display         3/18/2025 8:30 AM    The attending portion of this evaluation, treatment, and documentation was performed per Diana Fraire MD via Telemedicine AudioVisual using the secure [employer] software platform with 2 way audio/video. The provider was located off-site and the patient is located in  the hospital. The aforementioned video software was utilized to document the relevant history and physical exam.     Critical care time spent: >35 minutes      PROVIDER:[TOKEN:[[id number]]]

## 2025-04-09 ENCOUNTER — RX RENEWAL (OUTPATIENT)
Age: 60
End: 2025-04-09

## (undated) DEVICE — NDL BLUNT 18G LOOP VESSEL MAXI WHITE

## (undated) DEVICE — PACK CARDIAC YELLOW

## (undated) DEVICE — GLV 7.5 PROTEXIS (WHITE)

## (undated) DEVICE — SUT SOFSILK 4-0 18" TIES

## (undated) DEVICE — SOL NORMOSOL-R PH7.4 1000ML

## (undated) DEVICE — GOWN TRIMAX XXL

## (undated) DEVICE — SYR LUER LOK 10CC

## (undated) DEVICE — DRAPE MAGNETIC INSTRUMENT MEDIUM

## (undated) DEVICE — POSITIONER CARDIAC BUMP

## (undated) DEVICE — DRAPE SLUSH / WARMER 44 X 66"

## (undated) DEVICE — DRSG TEGADERM 6"X8"

## (undated) DEVICE — GLV 8 PROTEXIS (WHITE)

## (undated) DEVICE — PREP CHLORAPREP HI-LITE ORANGE 26ML

## (undated) DEVICE — SUT PROLENE 7-0 4-24" BV-1

## (undated) DEVICE — DRAPE TOWEL BLUE 17" X 24"

## (undated) DEVICE — SUT VICRYL 0 36" CTX UNDYED

## (undated) DEVICE — MARKING PEN W RULER

## (undated) DEVICE — CONNECTOR STRAIGHT 3/8 X 1/2"

## (undated) DEVICE — SOL IRR POUR H2O 250ML

## (undated) DEVICE — GLV 8.5 PROTEXIS (WHITE)

## (undated) DEVICE — DRAPE INSTRUMENT POUCH 6.75" X 11"

## (undated) DEVICE — SYS VEIN HARVESTING VIRTUOSAPH PLUS W/ RADIAL

## (undated) DEVICE — CHEST DRAIN PLEUR-EVAC WET/WET ADULT-PEDS SINGLE (QUICK)

## (undated) DEVICE — NDL HYPO SAFE 25G X 1" (ORANGE)

## (undated) DEVICE — PACK UNIVERSAL CARDIAC SUPPLEMNTAL B

## (undated) DEVICE — SUT SOFSILK 0 30" V-20

## (undated) DEVICE — STOPCOCK 4-WAY 2 GANG W SWIVEL MALE LUER LOCK

## (undated) DEVICE — BEAVER BLADE MINI SHARP ALL ROUND (BLUE)

## (undated) DEVICE — ELCTR BOVIE PENCIL HANDPIECE

## (undated) DEVICE — Device

## (undated) DEVICE — VISITEC 4X4

## (undated) DEVICE — STAPLER SKIN VISI-STAT 35 WIDE

## (undated) DEVICE — VESSEL LOOP MAXI-RED  0.120" X 16"

## (undated) DEVICE — SYNOVIS VASCULAR PROBE 1.5MM 15CM

## (undated) DEVICE — SWITCH ARISS TABLE MOUNT UNEQUAL LEGS 13"

## (undated) DEVICE — SOL ANTI FOG

## (undated) DEVICE — TUBING TUR 2 PRONG

## (undated) DEVICE — SUCTION CATH ARGYLE WHISTLE TIP 14FR STRAIGHT PACKED

## (undated) DEVICE — DRSG ACE BANDAGE 6"

## (undated) DEVICE — SUT DOUBLE 6 WIRE STERNAL

## (undated) DEVICE — DRAPE 1/2 SHEET 40X57"

## (undated) DEVICE — ELCTR HEX BLADE

## (undated) DEVICE — MULTIPLE PERFUSION SET FEMALE 1 INLET LEG W 4 LEGS 15" (BLUE/RED)

## (undated) DEVICE — SPECIMEN CONTAINER 100ML

## (undated) DEVICE — GLV 8 PROTEXIS ORTHO (CREAM)

## (undated) DEVICE — BULLDOG SPRING CLIP 6MM SOFT/SOFT

## (undated) DEVICE — NDL COUNTER FOAM AND MAGNET 40-70

## (undated) DEVICE — SAW BLADE MICROAIRE OSCILLATING LG 0.9X64X33.5MM

## (undated) DEVICE — SUT PROLENE 8-0 24" BV175-6

## (undated) DEVICE — DRAIN RESERVOIR FOR JACKSON PRATT 100CC CARDINAL

## (undated) DEVICE — DRSG STERISTRIPS 0.5 X 4"

## (undated) DEVICE — PACK UNIVERSAL CARDIAC

## (undated) DEVICE — TISSUE STABILIZER MEDTRONIC OCTOPUS EVOLUTION

## (undated) DEVICE — LAP PAD 18 X 18"

## (undated) DEVICE — FEEDING TUBE NG SUMP 16FR 48"

## (undated) DEVICE — TUBING ATS SUCTION LINE

## (undated) DEVICE — DRAPE IOBAN 23" X 23"

## (undated) DEVICE — PACING CABLE BI-V TEMP ALLIGATOR CLIP 8FR

## (undated) DEVICE — ACROBAT SUV VACUUM OFF PUMP SYSTEM

## (undated) DEVICE — BLADE SCALPEL SAFETYLOCK #10

## (undated) DEVICE — SUT VICRYL 1 36" CTX UNDYED

## (undated) DEVICE — GOWN TRIMAX LG

## (undated) DEVICE — DEFIBRILLATOR PAD PRE-CONNECT ADULT/CHILD

## (undated) DEVICE — GOWN LG

## (undated) DEVICE — SYR LUER LOK 3CC

## (undated) DEVICE — DRSG OPSITE 13.75 X 4"

## (undated) DEVICE — WARMING BLANKET DUO-THERM HYPER/HYPOTHERM ADULT

## (undated) DEVICE — TUBING SUCTION 20FT

## (undated) DEVICE — DRAPE LIGHT HANDLE COVER (BLUE)

## (undated) DEVICE — AORTIC PUNCH 4.0MM LONG LENGTH HANDLE

## (undated) DEVICE — SUT SURGICAL STEEL 6 30" BP-1

## (undated) DEVICE — GOWN SLEEVES

## (undated) DEVICE — SYR LUER LOK 50CC

## (undated) DEVICE — SUT PROLENE 4-0 18" PS-2

## (undated) DEVICE — SOL IRR POUR NS 0.9% 500ML

## (undated) DEVICE — TUBING INSUFFLATION LAP FILTER 10FT

## (undated) DEVICE — SUT PLEDGET SOFT LARGE 3/8" X 3/16" X 1/16" X6

## (undated) DEVICE — CLAMP BULLDOG MIDI 45 DEGREE (YELLOW) DISP

## (undated) DEVICE — SUCTION TUBE CARDIAC SOFT TIP 6FR SHAFT 10FR TIP 6"

## (undated) DEVICE — GETINGE VASOVIEW 7 ENDOSCOPIC VESSEL HARVESTING SYSTEM

## (undated) DEVICE — SYR LUER LOK 20CC

## (undated) DEVICE — GLV 6.5 PROTEXIS (WHITE)

## (undated) DEVICE — STRYKER INTERPULSE HANDPIECE W IRR SUCTION TUBE

## (undated) DEVICE — CANISTER SUCTION 2000CC

## (undated) DEVICE — MEDICATION LABELS W MARKER

## (undated) DEVICE — SOL IRR BAG NS 0.9% 3000ML

## (undated) DEVICE — NDL HYPO SAFE 18G X 1.5" (PINK)

## (undated) DEVICE — MEDTRONIC URCHIN EVO HEART POSITIONER & CANISTER TUBING SET

## (undated) DEVICE — DRSG DERMABOND PRINEO 60CM

## (undated) DEVICE — POSITIONER FOAM EGG CRATE ULNAR 2PCS (PINK)

## (undated) DEVICE — SUT PROLENE 6-0 4-30" C-1

## (undated) DEVICE — STEALTH CLAMP INSERT FIBRA/FIBRA 60MM

## (undated) DEVICE — SUCTION YANKAUER NO CONTROL VENT

## (undated) DEVICE — BLADE ACCESS RAIL DEEP

## (undated) DEVICE — STEALTH CLAMP INSERT FIBRA/FIBRA 90MM

## (undated) DEVICE — SAW BLADE SYNVASIVE OSCILLATING

## (undated) DEVICE — FOLEY TRAY 16FR 5CC LTX UMETER CLOSED

## (undated) DEVICE — DRAPE MAYO STAND 30"

## (undated) DEVICE — GLV 7 PROTEXIS (WHITE)

## (undated) DEVICE — SUT BLUNT SZ 5

## (undated) DEVICE — BLOWER MISTER AXIUS WITH IV SET

## (undated) DEVICE — DRSG OPSITE 2.5 X 2"

## (undated) DEVICE — SYR ASEPTO

## (undated) DEVICE — DRAPE 3/4 SHEET W REINFORCEMENT 56X77"

## (undated) DEVICE — DEVICE CLAMP ENCOMPASS SYNERGY ISOLATOR

## (undated) DEVICE — SUT HOLDER INSERT FOR OCTOBASE STERNAL RETRACTOR

## (undated) DEVICE — SUT VICRYL 3-0 27" CT-1

## (undated) DEVICE — ELCTR BOVIE TIP BLADE VALLEYLAB 6.5"

## (undated) DEVICE — SUT PROLENE 6-0 30" C-1

## (undated) DEVICE — DRAIN CHANNEL 32FR ROUND HUBLESS FULL FLUTED

## (undated) DEVICE — BLADE SCALPEL SAFETYLOCK #11

## (undated) DEVICE — SUT BOOT STANDARD (YELLOW) 5 PAIR

## (undated) DEVICE — PACING CABLE A/V TEMP SCREW DOWN 6FT

## (undated) DEVICE — SUT TICRON 5 30" KV-40

## (undated) DEVICE — BLADE SCALPEL SAFETYLOCK #15

## (undated) DEVICE — PACING CABLE (BROWN) A/V TEMP SCREW DOWN 12FT

## (undated) DEVICE — SUT STAINLESS STEEL 5 18" SCC

## (undated) DEVICE — TOURNIQUET SET 12FR (1 RED, 1 BLUE, 1 SNARE) 7"

## (undated) DEVICE — DRSG KLING 6"

## (undated) DEVICE — GUIDE SELECTION F/ATRICLIP LAA SYS

## (undated) DEVICE — SUMP PERICARDIAL 20FR 1/4" ADULT